# Patient Record
Sex: FEMALE | Race: BLACK OR AFRICAN AMERICAN | Employment: FULL TIME | URBAN - METROPOLITAN AREA
[De-identification: names, ages, dates, MRNs, and addresses within clinical notes are randomized per-mention and may not be internally consistent; named-entity substitution may affect disease eponyms.]

---

## 2018-01-25 PROBLEM — R06.02 SHORTNESS OF BREATH: Status: ACTIVE | Noted: 2018-01-25

## 2018-01-26 PROBLEM — G47.33 OBSTRUCTIVE SLEEP APNEA SYNDROME: Status: ACTIVE | Noted: 2018-01-26

## 2018-01-26 PROBLEM — I10 ESSENTIAL HYPERTENSION, BENIGN: Status: ACTIVE | Noted: 2018-01-26

## 2018-01-26 PROBLEM — E66.01 MORBID OBESITY (HCC): Status: ACTIVE | Noted: 2018-01-26

## 2018-01-26 PROBLEM — I50.43 ACUTE ON CHRONIC COMBINED SYSTOLIC AND DIASTOLIC CHF (CONGESTIVE HEART FAILURE) (HCC): Status: ACTIVE | Noted: 2018-01-26

## 2018-02-09 ENCOUNTER — TELEPHONE (OUTPATIENT)
Dept: CARDIOLOGY CLINIC | Age: 48
End: 2018-02-09

## 2018-02-09 ENCOUNTER — OFFICE VISIT (OUTPATIENT)
Dept: CARDIOLOGY CLINIC | Age: 48
End: 2018-02-09

## 2018-02-09 ENCOUNTER — HOSPITAL ENCOUNTER (OUTPATIENT)
Dept: OTHER | Age: 48
Discharge: HOME | End: 2018-02-09
Attending: CLINICAL NURSE SPECIALIST | Admitting: CLINICAL NURSE SPECIALIST

## 2018-02-09 VITALS
BODY MASS INDEX: 48.82 KG/M2 | WEIGHT: 293 LBS | RESPIRATION RATE: 17 BRPM | DIASTOLIC BLOOD PRESSURE: 68 MMHG | SYSTOLIC BLOOD PRESSURE: 112 MMHG | HEART RATE: 86 BPM | OXYGEN SATURATION: 92 % | HEIGHT: 65 IN

## 2018-02-09 DIAGNOSIS — E66.01 MORBIDLY OBESE (HCC): ICD-10-CM

## 2018-02-09 DIAGNOSIS — I50.40 COMBINED SYSTOLIC AND DIASTOLIC ACC/AHA STAGE C CONGESTIVE HEART FAILURE (HCC): Primary | ICD-10-CM

## 2018-02-09 DIAGNOSIS — G47.33 OSA (OBSTRUCTIVE SLEEP APNEA): ICD-10-CM

## 2018-02-09 DIAGNOSIS — I10 ESSENTIAL HYPERTENSION: ICD-10-CM

## 2018-02-09 LAB
ANION GAP SERPL CALCULATED.3IONS-SCNC: 13 MMOL/L (ref 3–16)
BUN BLDV-MCNC: 13 MG/DL (ref 7–20)
CALCIUM SERPL-MCNC: 9.4 MG/DL (ref 8.3–10.6)
CHLORIDE BLD-SCNC: 97 MMOL/L (ref 99–110)
CO2: 30 MMOL/L (ref 21–32)
CREAT SERPL-MCNC: 1 MG/DL (ref 0.6–1.1)
GFR AFRICAN AMERICAN: >60
GFR NON-AFRICAN AMERICAN: 59
GLUCOSE BLD-MCNC: 114 MG/DL (ref 70–99)
POTASSIUM SERPL-SCNC: 4.5 MMOL/L (ref 3.5–5.1)
PRO-BNP: 229 PG/ML (ref 0–124)
SODIUM BLD-SCNC: 140 MMOL/L (ref 136–145)

## 2018-02-09 PROCEDURE — 99214 OFFICE O/P EST MOD 30 MIN: CPT | Performed by: CLINICAL NURSE SPECIALIST

## 2018-02-09 RX ORDER — CARVEDILOL 6.25 MG/1
6.25 TABLET ORAL 2 TIMES DAILY WITH MEALS
Qty: 60 TABLET | Refills: 1 | Status: SHIPPED | OUTPATIENT
Start: 2018-02-09 | End: 2018-05-01 | Stop reason: SDUPTHER

## 2018-02-09 NOTE — TELEPHONE ENCOUNTER
----- Message from Janee Deluna NP sent at 2/9/2018  1:05 PM EST -----  Sorin jewell  Please send office visit to PCP [facility] clinic? ?   Thanks  rg

## 2018-02-09 NOTE — PROGRESS NOTES
pruritis. · Neurological: No headache, diplopia, change in muscle strength, numbness or tingling. No change in gait, balance, coordination, mood, affect, memory, mentation, behavior. +dizziness  · Psychiatric: No anxiety, no depression. · Endocrine: No malaise, fatigue or temperature intolerance. No excessive thirst, fluid intake, or urination. No tremor. · Hematologic/Lymphatic: No abnormal bruising or bleeding, blood clots or swollen lymph nodes. · Allergic/Immunologic: No nasal congestion or hives. Physical Examination:    Vitals:    02/09/18 0854   BP: 112/68   Site: Left Arm   Position: Sitting   Cuff Size: Medium Adult   Pulse: 86   Resp: 17   SpO2: 92%   Weight: (!) 377 lb 1.9 oz (171.1 kg)   Height: 5' 5\" (1.651 m)        Constitutional and General Appearance: Warm and dry, no apparent distress, normal coloration  HEENT:  Normocephalic, atraumatic  Respiratory:  · Normal excursion and expansion without use of accessory muscles  · Resp Auscultation: Normal breath sounds without dullness  Cardiovascular:  · The apical impulses not displaced  · Heart tones are crisp and normal  · JVP normal cm H2O  · Regular rate and rhythm  · Peripheral pulses are symmetrical and full  · There is no clubbing, cyanosis of the extremities.   · no edema  · Pedal Pulses: 2+ and equal   Abdomen:  · No masses or tenderness  · Liver/Spleen: No Abnormalities Noted  Neurological/Psychiatric:  · Alert and oriented in all spheres  · Moves all extremities well  · Exhibits normal gait balance and coordination  · No abnormalities of mood, affect, memory, mentation, or behavior are noted    Lab Data:    CBC:   Lab Results   Component Value Date    WBC 5.9 02/01/2018    WBC 7.9 01/31/2018    WBC 7.0 01/30/2018    RBC 5.29 02/01/2018    RBC 5.03 01/31/2018    RBC 5.06 01/30/2018    HGB 12.6 02/01/2018    HGB 11.9 01/31/2018    HGB 12.2 01/30/2018    HCT 41.5 02/01/2018    HCT 39.0 01/31/2018    HCT 39.7 01/30/2018    MCV 78.5 02/01/2018 change. - Baseline ECG: Normal sinus rhythm. - Stress: The patient experienced no chest pain during stress. - Stress ECG conclusions: The stress ECG was negative for ischemia. - Baseline: LV global systolic function was normal. The estimated LV    ejection fraction was 50% to 55%. Normal wall motion; no LV    regional wall motion abnormalities. - Peak stress: LV global systolic function was vigorous. The    estimated LV ejection fraction was 70% to 75%. Normal wall motion;    no LV regional wall motion abnormalities. Impressions:  No ischemic wall motion abnormalitiesafter  pharmacologic stress. Assessment:    1. 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 Morbidly obese ([address]    3. Essential hypertension    4. TRUE (obstructive sleep apnea)        Plan:   1. Increase coreg to 6.25 mg twice a day (she is only on once a day)  2. Check labs (BNP and BMP) today  3. Continue current medications  4. Call if weight up 3 pounds in a day or 5 pounds in a week  5. Continue low sodium (<4000) and cardiac diet and 64 ounces of liquids a day  6. RTO in 2 weeks  7. Cardiac rehab referral  8. Recommend shared medical class on Tuesday  9. Handicap letter done    I appreciate the opportunity of cooperating in the care of this individual.    JEFFREY Dunaway, 2/9/2018, 1:03 PM    QUALITY MEASURES  1. Tobacco Cessation Counseling: NA  2. Retake of BP if >140/90:   NA  3. Documentation to PCP/referring for new patient:  Sent to PCP at close of office visit  4. CAD patient on anti-platelet: NA  5. CAD patient on STATIN therapy:  NA  6.  Patient with CHF and aFib on anticoagulation:  NA

## 2018-02-09 NOTE — TELEPHONE ENCOUNTER
----- Message from Konnie Bamberger, NP sent at 2/9/2018  2:26 PM EST -----  Please let her know that labs are good, fluid level is down  Continue all current medications  thanks

## 2018-02-09 NOTE — TELEPHONE ENCOUNTER
Would like to know if nprg would write her an rx for a handicap sticker.  Please call patient when ready [phone]

## 2018-02-23 ENCOUNTER — HOSPITAL ENCOUNTER (OUTPATIENT)
Dept: OTHER | Age: 48
Discharge: HOME | End: 2018-02-28
Attending: CLINICAL NURSE SPECIALIST | Admitting: CLINICAL NURSE SPECIALIST

## 2018-02-23 ENCOUNTER — OFFICE VISIT (OUTPATIENT)
Dept: CARDIOLOGY CLINIC | Age: 48
End: 2018-02-23

## 2018-02-23 VITALS
DIASTOLIC BLOOD PRESSURE: 62 MMHG | RESPIRATION RATE: 17 BRPM | SYSTOLIC BLOOD PRESSURE: 124 MMHG | BODY MASS INDEX: 48.82 KG/M2 | WEIGHT: 293 LBS | OXYGEN SATURATION: 94 % | HEART RATE: 76 BPM | HEIGHT: 65 IN

## 2018-02-23 DIAGNOSIS — E66.01 MORBID OBESITY (HCC): ICD-10-CM

## 2018-02-23 DIAGNOSIS — I10 ESSENTIAL HYPERTENSION: ICD-10-CM

## 2018-02-23 DIAGNOSIS — I42.9 CARDIOMYOPATHY, UNSPECIFIED TYPE (HCC): ICD-10-CM

## 2018-02-23 DIAGNOSIS — G47.33 OBSTRUCTIVE SLEEP APNEA SYNDROME: ICD-10-CM

## 2018-02-23 DIAGNOSIS — I50.22 CHRONIC SYSTOLIC HEART FAILURE (HCC): Primary | ICD-10-CM

## 2018-02-23 LAB
ANION GAP SERPL CALCULATED.3IONS-SCNC: 11 MMOL/L (ref 3–16)
BUN BLDV-MCNC: 13 MG/DL (ref 7–20)
CALCIUM SERPL-MCNC: 9.3 MG/DL (ref 8.3–10.6)
CHLORIDE BLD-SCNC: 100 MMOL/L (ref 99–110)
CO2: 29 MMOL/L (ref 21–32)
CREAT SERPL-MCNC: 0.8 MG/DL (ref 0.6–1.1)
GFR AFRICAN AMERICAN: >60
GFR NON-AFRICAN AMERICAN: >60
GLUCOSE BLD-MCNC: 95 MG/DL (ref 70–99)
POTASSIUM SERPL-SCNC: 3.9 MMOL/L (ref 3.5–5.1)
PRO-BNP: 566 PG/ML (ref 0–124)
SODIUM BLD-SCNC: 140 MMOL/L (ref 136–145)

## 2018-02-23 PROCEDURE — 99215 OFFICE O/P EST HI 40 MIN: CPT | Performed by: CLINICAL NURSE SPECIALIST

## 2018-02-23 RX ORDER — FUROSEMIDE 10 MG/ML
120 INJECTION INTRAMUSCULAR; INTRAVENOUS ONCE
Status: COMPLETED | OUTPATIENT
Start: 2018-02-23 | End: 2018-02-23

## 2018-02-23 RX ORDER — FUROSEMIDE 10 MG/ML
INJECTION INTRAMUSCULAR; INTRAVENOUS
Status: COMPLETED
Start: 2018-02-23 | End: 2018-02-23

## 2018-02-23 RX ORDER — FUROSEMIDE 40 MG/1
40 TABLET ORAL 2 TIMES DAILY
Qty: 60 TABLET | Refills: 3 | Status: SHIPPED | OUTPATIENT
Start: 2018-02-23 | End: 2018-06-19 | Stop reason: SDUPTHER

## 2018-02-23 RX ORDER — FUROSEMIDE 10 MG/ML
INJECTION INTRAMUSCULAR; INTRAVENOUS
Status: DISPENSED
Start: 2018-02-23 | End: 2018-02-24

## 2018-02-23 RX ADMIN — FUROSEMIDE 120 MG: 10 INJECTION INTRAMUSCULAR; INTRAVENOUS at 14:45

## 2018-02-23 NOTE — PROGRESS NOTES
[address]  Progress Note    Primary Care Doctor:  Zachary Burrell    Chief Complaint   Patient presents with    2 Week Follow-Up     Denies chest pain    Congestive Heart Failure    Hypertension    Obesity    Sleep Apnea    Dizziness     when having a  coughing spell    Shortness of Breath     with any exertion  uses O2 @ 2L via nc when she feels she needs it    Cough     still has cough; productive of white phlegm at times    Edema     BLE  left leg feels heavy and weak at times        History of Present Illness:  50year old female with history of HTN, DM, morbidly obese and asthma. Hospital admission 1/25-2/1 for flu, sob and newly diagnosis of combined diastolic/systolic heart failure. She declined having stress test and not interested in pursuing sleep evaluation for TRUE    I had the pleasure of seeing Jarocho Avendaño in follow up sHF. Her weight is up 13 pounds which she states is because she didn't take lasix sat and sunday in the mornings because she went out to eat with family and to the movies. She ate popcorn with butter and 3 different restaurants. She is more sob, using oxygen more during the day, coughing at night/day when she sleeps. She is dizzy after she coughs. She has not been weighing her self as she was afraid of how much she weighs. She was given a scale from us when she left. She continues with some swelling in her lower legs    Past Medical History:   has a past medical history of Asthma; Diabetes mellitus ([address] Heart murmur; and Hypertension. Surgical History:   has no past surgical history on file. Social History:   reports that she has never smoked. She has never used smokeless tobacco. She reports that she does not drink alcohol or use drugs.    Family History:   Family History   Problem Relation Age of Onset    High Blood Pressure Mother     Other Mother      cardiac murmur    Arthritis Mother     High Blood Pressure Father     High Blood Pressure

## 2018-03-01 ENCOUNTER — HOSPITAL ENCOUNTER (OUTPATIENT)
Dept: ONCOLOGY | Age: 48
Discharge: HOME | End: 2018-03-31
Attending: CLINICAL NURSE SPECIALIST | Admitting: CLINICAL NURSE SPECIALIST

## 2018-03-19 ENCOUNTER — OFFICE VISIT (OUTPATIENT)
Dept: CARDIOLOGY CLINIC | Age: 48
End: 2018-03-19

## 2018-03-19 ENCOUNTER — HOSPITAL ENCOUNTER (OUTPATIENT)
Dept: OTHER | Age: 48
Discharge: HOME | End: 2018-03-19
Attending: CLINICAL NURSE SPECIALIST | Admitting: CLINICAL NURSE SPECIALIST

## 2018-03-19 VITALS
BODY MASS INDEX: 48.82 KG/M2 | RESPIRATION RATE: 17 BRPM | DIASTOLIC BLOOD PRESSURE: 62 MMHG | HEART RATE: 89 BPM | SYSTOLIC BLOOD PRESSURE: 108 MMHG | WEIGHT: 293 LBS | HEIGHT: 65 IN | OXYGEN SATURATION: 92 %

## 2018-03-19 DIAGNOSIS — I50.22 CHRONIC SYSTOLIC HEART FAILURE (HCC): Primary | ICD-10-CM

## 2018-03-19 DIAGNOSIS — I50.22 CHRONIC SYSTOLIC HEART FAILURE (HCC): ICD-10-CM

## 2018-03-19 LAB
ANION GAP SERPL CALCULATED.3IONS-SCNC: 14 MMOL/L (ref 3–16)
BUN BLDV-MCNC: 10 MG/DL (ref 7–20)
CALCIUM SERPL-MCNC: 9 MG/DL (ref 8.3–10.6)
CHLORIDE BLD-SCNC: 95 MMOL/L (ref 99–110)
CO2: 32 MMOL/L (ref 21–32)
CREAT SERPL-MCNC: 0.8 MG/DL (ref 0.6–1.1)
GFR AFRICAN AMERICAN: >60
GFR NON-AFRICAN AMERICAN: >60
GLUCOSE BLD-MCNC: 91 MG/DL (ref 70–99)
POTASSIUM SERPL-SCNC: 3.9 MMOL/L (ref 3.5–5.1)
PRO-BNP: 435 PG/ML (ref 0–124)
SODIUM BLD-SCNC: 141 MMOL/L (ref 136–145)

## 2018-03-19 PROCEDURE — 99214 OFFICE O/P EST MOD 30 MIN: CPT | Performed by: CLINICAL NURSE SPECIALIST

## 2018-03-19 RX ORDER — VALSARTAN 80 MG/1
80 TABLET ORAL DAILY
Qty: 30 TABLET | Refills: 1 | Status: SHIPPED | OUTPATIENT
Start: 2018-03-19 | End: 2018-06-18 | Stop reason: SDUPTHER

## 2018-03-19 NOTE — PATIENT INSTRUCTIONS
1.  Stop lisinopril and start valsartan 80 mg once a day  2. Check fluid and electrolytes today  3. Cardiac rehab evaluation 3/22/18   4.   RTO in 1 month

## 2018-03-19 NOTE — PROGRESS NOTES
[facility] [place]    Primary Care Doctor:  Ralph Stacy    Chief Complaint   Patient presents with    2 Week Follow-Up     Denies chest pain,     Congestive Heart Failure    Cardiomyopathy    Hypertension    Obesity    Palpitations     gets palpitations on occasion    Shortness of Breath     with activity    Dizziness     sometimes    Edema     BLE    Fatigue        History of Present Illness:  50year old female with history of HTN, DM, morbidly obese and asthma. Hospital admission 1/25-2/1 for flu, sob and newly diagnosis of combined diastolic/systolic heart failure. She declined having stress test and not interested in pursuing sleep evaluation for TRUE    I had the pleasure of seeing Antonia Reece in follow up sHF. At last appointment, she was up 13 pounds due to dietary indiscretions and today her weight is down 10. She had received some IV lasix and continues on all her medications. She complains of a cough all the time. She has some sob with exertion and some dizziness at times. BLE edema has improved. Fatigue and palpitations at times but none today. Her weight at home has declined as well. She is scheduled later this week for her cardiac rehab evaluation. Past Medical History:   has a past medical history of Asthma; Diabetes mellitus ([address] Heart murmur; and Hypertension. Surgical History:   has no past surgical history on file. Social History:   reports that she has never smoked. She has never used smokeless tobacco. She reports that she does not drink alcohol or use drugs. Family History:   Family History   Problem Relation Age of Onset    High Blood Pressure Mother     Other Mother      cardiac murmur    Arthritis Mother     High Blood Pressure Father     High Blood Pressure Sister     Diabetes Sister      prediabetic       Home Medications:  Prior to Admission medications    Medication Sig Start Date End Date Taking?  Authorizing Provider   furosemide (LASIX) 40 MG tablet Take 1 tablet by mouth 2 times daily 80 mg in am and 40 mg in pm 2/23/18  Yes Lea Ambriz NP   carvedilol (COREG) 6.25 MG tablet Take 1 tablet by mouth 2 times daily (with meals) 2/9/18  Yes Lea Ambriz NP   aspirin 81 MG chewable tablet Take 1 tablet by mouth daily 2/2/18  Yes Williams Hernandez MD   atorvastatin (LIPITOR) 20 MG tablet Take 1 tablet by mouth nightly 2/1/18  Yes Williams Hernandez MD   lisinopril (PRINIVIL;ZESTRIL) 10 MG tablet Take 1 tablet by mouth daily 2/2/18  Yes Williams Hernandez MD   metFORMIN (GLUCOPHAGE) 500 MG tablet Take 1 tablet by mouth 2 times daily (with meals)  Patient taking differently: Take 500 mg by mouth 2 times daily (with meals)  2/1/18  Yes Williams Hernandez MD   albuterol sulfate  (90 Base) MCG/ACT inhaler Inhale 2 puffs into the lungs 12/21/17  Yes Historical Provider, MD   omeprazole (PRILOSEC) 40 MG delayed release capsule Take 40 mg by mouth  6/9/17  Yes Historical Provider, MD   ketoconazole (NIZORAL) 2 % cream Apply  topically daily. Apply topically daily. Yes Historical Provider, MD   mometasone-formoterol [facility]) 100-5 MCG/ACT inhaler Inhale 2 puffs into the lungs 2 times daily 2/1/18   Williams Hernandez MD        Allergies:  Levofloxacin     Review of Systems:   · Constitutional: there has been no unanticipated weight loss. There's been no change in energy level, sleep pattern, or activity level. · Eyes: No visual changes or diplopia. No scleral icterus. · ENT: No Headaches, hearing loss or vertigo. No mouth sores or sore throat. BLE/fatigue/palpitations  · Cardiovascular: Reviewed in HPI  · Respiratory: No cough or wheezing, no sputum production. No hematemesis. +cough/SOB with exertion   · Gastrointestinal: No abdominal pain, appetite loss, blood in stools. No change in bowel or bladder habits. · Genitourinary: No dysuria, trouble voiding, or hematuria.   · Musculoskeletal:  No gait disturbance, weakness or joint complaints. · Integumentary: No rash or pruritis. · Neurological: No headache, diplopia, change in muscle strength, numbness or tingling. No change in gait, balance, coordination, mood, affect, memory, mentation, behavior. +dizziness  · Psychiatric: No anxiety, no depression. · Endocrine: No malaise, fatigue or temperature intolerance. No excessive thirst, fluid intake, or urination. No tremor. · Hematologic/Lymphatic: No abnormal bruising or bleeding, blood clots or swollen lymph nodes. · Allergic/Immunologic: No nasal congestion or hives. Physical Examination:    Vitals:    03/19/18 1437   BP: 108/62   Site: Left Arm   Position: Sitting   Cuff Size: Large Adult   Pulse: 89   Resp: 17   SpO2: 92%   Weight: (!) 380 lb 9.6 oz (172.6 kg)   Height: 5' 5\" (1.651 m)        Constitutional and General Appearance: Warm and dry, no apparent distress, normal coloration  HEENT:  Normocephalic, atraumatic  Respiratory:  · Normal excursion and expansion without use of accessory muscles  · Resp Auscultation: Normal breath sounds without dullness  Cardiovascular:  · The apical impulses not displaced  · Heart tones are crisp and normal  · JVP normal cm H2O  · Regular rate and rhythm  · Peripheral pulses are symmetrical and full  · There is no clubbing, cyanosis of the extremities.   · tiara lower leg edema ankles only  · Pedal Pulses: 2+ and equal   Abdomen:  · No masses or tenderness  · Liver/Spleen: No Abnormalities Noted  Neurological/Psychiatric:  · Alert and oriented in all spheres  · Moves all extremities well  · Exhibits normal gait balance and coordination  · No abnormalities of mood, affect, memory, mentation, or behavior are noted    Lab Data:    CBC:   Lab Results   Component Value Date    WBC 5.9 02/01/2018    WBC 7.9 01/31/2018    WBC 7.0 01/30/2018    RBC 5.29 02/01/2018    RBC 5.03 01/31/2018    RBC 5.06 01/30/2018    HGB 12.6 02/01/2018    HGB 11.9 01/31/2018    HGB 12.2 01/30/2018    HCT 41.5

## 2018-03-20 ENCOUNTER — TELEPHONE (OUTPATIENT)
Dept: CARDIOLOGY CLINIC | Age: 48
End: 2018-03-20

## 2018-03-22 ENCOUNTER — HOSPITAL ENCOUNTER (OUTPATIENT)
Dept: CARDIAC REHAB | Age: 48
Discharge: HOME | End: 2018-03-31
Attending: FAMILY MEDICINE | Admitting: CLINICAL NURSE SPECIALIST

## 2018-04-01 ENCOUNTER — HOSPITAL ENCOUNTER (OUTPATIENT)
Dept: OTHER | Age: 48
Discharge: HOME | End: 2018-04-30
Attending: CLINICAL NURSE SPECIALIST | Admitting: CLINICAL NURSE SPECIALIST

## 2018-04-16 ENCOUNTER — TELEPHONE (OUTPATIENT)
Dept: OTHER | Age: 48
End: 2018-04-16

## 2018-04-30 ENCOUNTER — OFFICE VISIT (OUTPATIENT)
Dept: CARDIOLOGY CLINIC | Age: 48
End: 2018-04-30

## 2018-04-30 VITALS
BODY MASS INDEX: 48.82 KG/M2 | WEIGHT: 293 LBS | SYSTOLIC BLOOD PRESSURE: 112 MMHG | HEIGHT: 65 IN | OXYGEN SATURATION: 93 % | DIASTOLIC BLOOD PRESSURE: 60 MMHG | RESPIRATION RATE: 17 BRPM | HEART RATE: 96 BPM

## 2018-04-30 DIAGNOSIS — I50.22 CHRONIC SYSTOLIC HEART FAILURE (HCC): Primary | ICD-10-CM

## 2018-04-30 DIAGNOSIS — I42.9 CARDIOMYOPATHY, UNSPECIFIED TYPE (HCC): ICD-10-CM

## 2018-04-30 DIAGNOSIS — I50.22 CHRONIC SYSTOLIC HEART FAILURE (HCC): ICD-10-CM

## 2018-04-30 DIAGNOSIS — I10 ESSENTIAL HYPERTENSION: ICD-10-CM

## 2018-04-30 DIAGNOSIS — E66.01 MORBID OBESITY (HCC): ICD-10-CM

## 2018-04-30 DIAGNOSIS — G47.33 OBSTRUCTIVE SLEEP APNEA SYNDROME: ICD-10-CM

## 2018-04-30 LAB
ANION GAP SERPL CALCULATED.3IONS-SCNC: 13 MMOL/L (ref 3–16)
BUN BLDV-MCNC: 12 MG/DL (ref 7–20)
CALCIUM SERPL-MCNC: 9.3 MG/DL (ref 8.3–10.6)
CHLORIDE BLD-SCNC: 95 MMOL/L (ref 99–110)
CO2: 33 MMOL/L (ref 21–32)
CREAT SERPL-MCNC: 0.8 MG/DL (ref 0.6–1.1)
GFR AFRICAN AMERICAN: >60
GFR NON-AFRICAN AMERICAN: >60
GLUCOSE BLD-MCNC: 125 MG/DL (ref 70–99)
POTASSIUM SERPL-SCNC: 3.9 MMOL/L (ref 3.5–5.1)
PRO-BNP: 199 PG/ML (ref 0–124)
SODIUM BLD-SCNC: 141 MMOL/L (ref 136–145)

## 2018-04-30 PROCEDURE — 99214 OFFICE O/P EST MOD 30 MIN: CPT | Performed by: CLINICAL NURSE SPECIALIST

## 2018-04-30 RX ORDER — SPIRONOLACTONE 25 MG/1
12.5 TABLET ORAL DAILY
Qty: 15 TABLET | Refills: 1 | Status: SHIPPED | OUTPATIENT
Start: 2018-04-30 | End: 2018-06-19 | Stop reason: SDUPTHER

## 2018-05-01 ENCOUNTER — TELEPHONE (OUTPATIENT)
Dept: CARDIOLOGY CLINIC | Age: 48
End: 2018-05-01

## 2018-05-01 ENCOUNTER — HOSPITAL ENCOUNTER (OUTPATIENT)
Dept: OTHER | Age: 48
Discharge: HOME | End: 2018-05-31
Attending: CLINICAL NURSE SPECIALIST | Admitting: CLINICAL NURSE SPECIALIST

## 2018-05-01 DIAGNOSIS — I50.22 CHRONIC SYSTOLIC HEART FAILURE (HCC): Primary | ICD-10-CM

## 2018-05-22 ENCOUNTER — TELEPHONE (OUTPATIENT)
Dept: CARDIOLOGY CLINIC | Age: 48
End: 2018-05-22

## 2018-05-22 ENCOUNTER — HOSPITAL ENCOUNTER (OUTPATIENT)
Dept: OTHER | Age: 48
Discharge: HOME | End: 2018-05-22
Attending: CLINICAL NURSE SPECIALIST | Admitting: CLINICAL NURSE SPECIALIST

## 2018-05-22 ENCOUNTER — OFFICE VISIT (OUTPATIENT)
Dept: SLEEP MEDICINE | Age: 48
End: 2018-05-22

## 2018-05-22 VITALS
OXYGEN SATURATION: 94 % | DIASTOLIC BLOOD PRESSURE: 68 MMHG | HEART RATE: 84 BPM | HEIGHT: 65 IN | WEIGHT: 293 LBS | BODY MASS INDEX: 48.82 KG/M2 | SYSTOLIC BLOOD PRESSURE: 104 MMHG

## 2018-05-22 DIAGNOSIS — I42.9 CARDIOMYOPATHY, UNSPECIFIED TYPE (HCC): Chronic | ICD-10-CM

## 2018-05-22 DIAGNOSIS — I10 ESSENTIAL HYPERTENSION: Chronic | ICD-10-CM

## 2018-05-22 DIAGNOSIS — R06.83 SNORING: ICD-10-CM

## 2018-05-22 DIAGNOSIS — E66.01 MORBID OBESITY (HCC): Chronic | ICD-10-CM

## 2018-05-22 DIAGNOSIS — G47.10 HYPERSOMNIA: Primary | ICD-10-CM

## 2018-05-22 DIAGNOSIS — I50.43 ACUTE ON CHRONIC COMBINED SYSTOLIC AND DIASTOLIC CHF (CONGESTIVE HEART FAILURE) (HCC): Chronic | ICD-10-CM

## 2018-05-22 DIAGNOSIS — I50.22 CHRONIC SYSTOLIC HEART FAILURE (HCC): ICD-10-CM

## 2018-05-22 DIAGNOSIS — E11.8 TYPE 2 DIABETES MELLITUS WITH COMPLICATION, UNSPECIFIED LONG TERM INSULIN USE STATUS: Chronic | ICD-10-CM

## 2018-05-22 PROBLEM — E11.9 DIABETES MELLITUS (HCC): Status: ACTIVE | Noted: 2018-05-22

## 2018-05-22 PROBLEM — E11.9 DIABETES MELLITUS (HCC): Chronic | Status: ACTIVE | Noted: 2018-05-22

## 2018-05-22 LAB
ANION GAP SERPL CALCULATED.3IONS-SCNC: 13 MMOL/L (ref 3–16)
BUN BLDV-MCNC: 16 MG/DL (ref 7–20)
CALCIUM SERPL-MCNC: 9.1 MG/DL (ref 8.3–10.6)
CHLORIDE BLD-SCNC: 96 MMOL/L (ref 99–110)
CO2: 31 MMOL/L (ref 21–32)
CREAT SERPL-MCNC: 0.8 MG/DL (ref 0.6–1.1)
GFR AFRICAN AMERICAN: >60
GFR NON-AFRICAN AMERICAN: >60
GLUCOSE BLD-MCNC: 92 MG/DL (ref 70–99)
POTASSIUM SERPL-SCNC: 4.4 MMOL/L (ref 3.5–5.1)
PRO-BNP: 161 PG/ML (ref 0–124)
SODIUM BLD-SCNC: 140 MMOL/L (ref 136–145)

## 2018-05-22 PROCEDURE — 99244 OFF/OP CNSLTJ NEW/EST MOD 40: CPT | Performed by: INTERNAL MEDICINE

## 2018-05-22 ASSESSMENT — SLEEP AND FATIGUE QUESTIONNAIRES
HOW LIKELY ARE YOU TO NOD OFF OR FALL ASLEEP WHILE LYING DOWN TO REST IN THE AFTERNOON WHEN CIRCUMSTANCES PERMIT: 3
HOW LIKELY ARE YOU TO NOD OFF OR FALL ASLEEP WHILE SITTING QUIETLY AFTER LUNCH WITHOUT ALCOHOL: 1
HOW LIKELY ARE YOU TO NOD OFF OR FALL ASLEEP WHILE WATCHING TV: 1
HOW LIKELY ARE YOU TO NOD OFF OR FALL ASLEEP WHILE SITTING AND READING: 1
HOW LIKELY ARE YOU TO NOD OFF OR FALL ASLEEP WHILE SITTING INACTIVE IN A PUBLIC PLACE: 0
HOW LIKELY ARE YOU TO NOD OFF OR FALL ASLEEP WHEN YOU ARE A PASSENGER IN A CAR FOR AN HOUR WITHOUT A BREAK: 1
HOW LIKELY ARE YOU TO NOD OFF OR FALL ASLEEP IN A CAR, WHILE STOPPED FOR A FEW MINUTES IN TRAFFIC: 0
NECK CIRCUMFERENCE (INCHES): 17
ESS TOTAL SCORE: 7
HOW LIKELY ARE YOU TO NOD OFF OR FALL ASLEEP WHILE SITTING AND TALKING TO SOMEONE: 0

## 2018-05-22 ASSESSMENT — ENCOUNTER SYMPTOMS
ABDOMINAL DISTENTION: 0
APNEA: 0
ABDOMINAL PAIN: 0
PHOTOPHOBIA: 0
CHOKING: 0
RHINORRHEA: 0
SHORTNESS OF BREATH: 1
NAUSEA: 0
CHEST TIGHTNESS: 0
ALLERGIC/IMMUNOLOGIC NEGATIVE: 1
VOMITING: 0
EYE PAIN: 0

## 2018-05-23 ENCOUNTER — TELEPHONE (OUTPATIENT)
Dept: CARDIOLOGY CLINIC | Age: 48
End: 2018-05-23

## 2018-05-25 ENCOUNTER — TELEPHONE (OUTPATIENT)
Dept: CARDIOLOGY CLINIC | Age: 48
End: 2018-05-25

## 2018-05-31 ENCOUNTER — OFFICE VISIT (OUTPATIENT)
Dept: CARDIOLOGY CLINIC | Age: 48
End: 2018-05-31

## 2018-05-31 VITALS
HEIGHT: 65 IN | SYSTOLIC BLOOD PRESSURE: 124 MMHG | DIASTOLIC BLOOD PRESSURE: 70 MMHG | WEIGHT: 293 LBS | HEART RATE: 96 BPM | BODY MASS INDEX: 48.82 KG/M2

## 2018-05-31 DIAGNOSIS — I50.22 CHRONIC SYSTOLIC HEART FAILURE (HCC): Primary | ICD-10-CM

## 2018-05-31 DIAGNOSIS — E66.01 MORBID OBESITY (HCC): Chronic | ICD-10-CM

## 2018-05-31 DIAGNOSIS — G47.33 OBSTRUCTIVE SLEEP APNEA SYNDROME: ICD-10-CM

## 2018-05-31 DIAGNOSIS — I10 ESSENTIAL HYPERTENSION: Chronic | ICD-10-CM

## 2018-05-31 PROCEDURE — 99214 OFFICE O/P EST MOD 30 MIN: CPT | Performed by: CLINICAL NURSE SPECIALIST

## 2018-05-31 RX ORDER — CARVEDILOL 12.5 MG/1
12.5 TABLET ORAL 2 TIMES DAILY
Qty: 60 TABLET | Refills: 1 | Status: SHIPPED | OUTPATIENT
Start: 2018-05-31 | End: 2018-08-24 | Stop reason: SDUPTHER

## 2018-06-04 ENCOUNTER — TELEPHONE (OUTPATIENT)
Dept: CARDIOLOGY CLINIC | Age: 48
End: 2018-06-04

## 2018-06-19 RX ORDER — FUROSEMIDE 40 MG/1
40 TABLET ORAL SEE ADMIN INSTRUCTIONS
Qty: 60 TABLET | Refills: 0 | Status: SHIPPED | OUTPATIENT
Start: 2018-06-19 | End: 2018-08-09 | Stop reason: SDUPTHER

## 2018-06-19 RX ORDER — SPIRONOLACTONE 25 MG/1
12.5 TABLET ORAL DAILY
Qty: 15 TABLET | Refills: 0 | Status: SHIPPED | OUTPATIENT
Start: 2018-06-19 | End: 2018-08-24 | Stop reason: SDUPTHER

## 2018-06-19 RX ORDER — ASPIRIN 81 MG/1
81 TABLET, CHEWABLE ORAL DAILY
Qty: 30 TABLET | Refills: 1 | Status: SHIPPED | OUTPATIENT
Start: 2018-06-19 | End: 2018-11-03 | Stop reason: SDUPTHER

## 2018-06-20 ENCOUNTER — HOSPITAL ENCOUNTER (OUTPATIENT)
Dept: SLEEP MEDICINE | Age: 48
Discharge: HOME | End: 2018-06-22
Attending: INTERNAL MEDICINE | Admitting: INTERNAL MEDICINE

## 2018-06-20 DIAGNOSIS — G47.10 HYPERSOMNIA: ICD-10-CM

## 2018-06-20 DIAGNOSIS — I42.9 CARDIOMYOPATHY, UNSPECIFIED TYPE (HCC): Chronic | ICD-10-CM

## 2018-06-20 DIAGNOSIS — I50.43 ACUTE ON CHRONIC COMBINED SYSTOLIC AND DIASTOLIC CHF (CONGESTIVE HEART FAILURE) (HCC): Chronic | ICD-10-CM

## 2018-06-20 DIAGNOSIS — R06.83 SNORING: ICD-10-CM

## 2018-06-20 PROCEDURE — 95810 POLYSOM 6/> YRS 4/> PARAM: CPT | Performed by: INTERNAL MEDICINE

## 2018-06-27 ENCOUNTER — TELEPHONE (OUTPATIENT)
Dept: SLEEP MEDICINE | Age: 48
End: 2018-06-27

## 2018-07-02 ENCOUNTER — TELEPHONE (OUTPATIENT)
Dept: SLEEP MEDICINE | Age: 48
End: 2018-07-02

## 2018-08-01 DIAGNOSIS — G47.33 OBSTRUCTIVE SLEEP APNEA SYNDROME: Primary | ICD-10-CM

## 2018-08-09 ENCOUNTER — TELEPHONE (OUTPATIENT)
Dept: CARDIOLOGY CLINIC | Age: 48
End: 2018-08-09

## 2018-08-09 RX ORDER — IRBESARTAN 150 MG/1
75 TABLET ORAL NIGHTLY
Qty: 30 TABLET | Refills: 0 | Status: SHIPPED | OUTPATIENT
Start: 2018-08-09 | End: 2018-08-24 | Stop reason: SDUPTHER

## 2018-08-09 RX ORDER — ATORVASTATIN CALCIUM 20 MG/1
20 TABLET, FILM COATED ORAL NIGHTLY
Qty: 30 TABLET | Refills: 0 | Status: SHIPPED | OUTPATIENT
Start: 2018-08-09 | End: 2018-08-24 | Stop reason: SDUPTHER

## 2018-08-09 RX ORDER — FUROSEMIDE 40 MG/1
40 TABLET ORAL SEE ADMIN INSTRUCTIONS
Qty: 90 TABLET | Refills: 0 | Status: SHIPPED | OUTPATIENT
Start: 2018-08-09 | End: 2018-10-18 | Stop reason: SDUPTHER

## 2018-08-24 ENCOUNTER — OFFICE VISIT (OUTPATIENT)
Dept: CARDIOLOGY CLINIC | Age: 48
End: 2018-08-24

## 2018-08-24 ENCOUNTER — HOSPITAL ENCOUNTER (OUTPATIENT)
Dept: OTHER | Age: 48
Discharge: HOME | End: 2018-08-24
Attending: CLINICAL NURSE SPECIALIST | Admitting: CLINICAL NURSE SPECIALIST

## 2018-08-24 VITALS
BODY MASS INDEX: 48.82 KG/M2 | RESPIRATION RATE: 17 BRPM | WEIGHT: 293 LBS | HEART RATE: 86 BPM | DIASTOLIC BLOOD PRESSURE: 70 MMHG | HEIGHT: 65 IN | SYSTOLIC BLOOD PRESSURE: 132 MMHG | OXYGEN SATURATION: 92 %

## 2018-08-24 DIAGNOSIS — G47.33 OSA (OBSTRUCTIVE SLEEP APNEA): ICD-10-CM

## 2018-08-24 DIAGNOSIS — I10 ESSENTIAL HYPERTENSION: ICD-10-CM

## 2018-08-24 DIAGNOSIS — I50.22 CHRONIC SYSTOLIC HEART FAILURE (HCC): Primary | ICD-10-CM

## 2018-08-24 DIAGNOSIS — E66.01 MORBID OBESITY WITH BMI OF 60.0-69.9, ADULT (HCC): ICD-10-CM

## 2018-08-24 DIAGNOSIS — I50.22 CHRONIC SYSTOLIC HEART FAILURE (HCC): ICD-10-CM

## 2018-08-24 LAB
ANION GAP SERPL CALCULATED.3IONS-SCNC: 10 MMOL/L (ref 3–16)
BUN BLDV-MCNC: 13 MG/DL (ref 7–20)
CALCIUM SERPL-MCNC: 9.3 MG/DL (ref 8.3–10.6)
CHLORIDE BLD-SCNC: 97 MMOL/L (ref 99–110)
CO2: 33 MMOL/L (ref 21–32)
CREAT SERPL-MCNC: 0.8 MG/DL (ref 0.6–1.1)
GFR AFRICAN AMERICAN: >60
GFR NON-AFRICAN AMERICAN: >60
GLUCOSE BLD-MCNC: 95 MG/DL (ref 70–99)
POTASSIUM SERPL-SCNC: 4.1 MMOL/L (ref 3.5–5.1)
PRO-BNP: 155 PG/ML (ref 0–124)
SODIUM BLD-SCNC: 140 MMOL/L (ref 136–145)

## 2018-08-24 PROCEDURE — 99214 OFFICE O/P EST MOD 30 MIN: CPT | Performed by: CLINICAL NURSE SPECIALIST

## 2018-08-24 RX ORDER — CARVEDILOL 12.5 MG/1
12.5 TABLET ORAL 2 TIMES DAILY
Qty: 60 TABLET | Refills: 1 | Status: SHIPPED | OUTPATIENT
Start: 2018-08-24 | End: 2019-01-18 | Stop reason: SDUPTHER

## 2018-08-24 RX ORDER — SPIRONOLACTONE 25 MG/1
12.5 TABLET ORAL DAILY
Qty: 15 TABLET | Refills: 0 | Status: SHIPPED | OUTPATIENT
Start: 2018-08-24 | End: 2019-01-18 | Stop reason: SDUPTHER

## 2018-08-24 RX ORDER — IRBESARTAN 150 MG/1
75 TABLET ORAL NIGHTLY
Qty: 30 TABLET | Refills: 0 | Status: SHIPPED | OUTPATIENT
Start: 2018-08-24 | End: 2019-01-18 | Stop reason: SDUPTHER

## 2018-08-24 RX ORDER — ATORVASTATIN CALCIUM 20 MG/1
20 TABLET, FILM COATED ORAL NIGHTLY
Qty: 30 TABLET | Refills: 0 | Status: SHIPPED | OUTPATIENT
Start: 2018-08-24 | End: 2018-11-03 | Stop reason: SDUPTHER

## 2018-08-24 NOTE — PROGRESS NOTES
[address]  Progress Note    Primary Care Doctor:  Loreta Goldberg    Chief Complaint   Patient presents with    3 Month Follow-Up     Denies chest pain    Hypertension    Obesity    Sleep Apnea    Dizziness     certain way she moves her head gets dizzy at times    Edema     BLE    Shortness of Breath     always with any exertion    Fatigue     always        History of Present Illness:  50year old female with history of HTN, DM, morbidly obese and asthma. Hospital admission 1/25-2/1 for flu, sob and newly diagnosis of combined diastolic/systolic heart failure. She declined having stress test     I had the pleasure of seeing Kristina Calvo in follow up sHF. She is ambulatory by her self today. Her weight is up 11 pounds and she has stopped taking her lasix 40 mg in the evening (\"I'm too busy\"). She admits to eating some fritos. She complains of dizziness all the time, sob with activity, edema in both legs and fatigue. She needs to reschedule her sleep titration appointment. She states that she has joined [employer]. She denies any chest pain or palpitations. She is taking all other medications. Past Medical History:   has a past medical history of Acute on chronic combined systolic and diastolic CHF (congestive heart failure) ([address] Asthma; Cardiomyopathy ([address] Diabetes mellitus [address] Heart murmur; and Hypertension. Surgical History:   has no past surgical history on file. Social History:   reports that she has never smoked. She has never used smokeless tobacco. She reports that she does not drink alcohol or use drugs.    Family History:   Family History   Problem Relation Age of Onset    High Blood Pressure Mother     Other Mother         cardiac murmur    Arthritis Mother     High Blood Pressure Father     High Blood Pressure Sister     Diabetes Sister         prediabetic       Home Medications:  Prior to Admission medications    Medication Sig Start Date End Date Reviewed in HPI  · Respiratory: No cough or wheezing, no sputum production. No hematemesis. · Gastrointestinal: No abdominal pain, appetite loss, blood in stools. No change in bowel or bladder habits. · Genitourinary: No dysuria, trouble voiding, or hematuria. · Musculoskeletal:  No gait disturbance, weakness or joint complaints. · Integumentary: No rash or pruritis. · Neurological: No headache, diplopia, change in muscle strength, numbness or tingling. No change in gait, balance, coordination, mood, affect, memory, mentation, behavior. · Psychiatric: No anxiety, no depression. · Endocrine: No malaise, fatigue or temperature intolerance. No excessive thirst, fluid intake, or urination. No tremor. · Hematologic/Lymphatic: No abnormal bruising or bleeding, blood clots or swollen lymph nodes. · Allergic/Immunologic: No nasal congestion or hives. Physical Examination:    Vitals:    08/24/18 1347   BP: 132/70   Site: Left Arm   Position: Sitting   Cuff Size: Large Adult   Pulse: 86   Resp: 17   SpO2: 92%   Weight: (!) 401 lb 3.2 oz (182 kg)   Height: 5' 5\" (1.651 m)        Constitutional and General Appearance: Warm and dry, no apparent distress, normal coloration  HEENT:  Normocephalic, atraumatic  Respiratory:  · Normal excursion and expansion without use of accessory muscles  · Resp Auscultation: Normal breath sounds without dullness  Cardiovascular:  · The apical impulses not displaced  · Heart tones are crisp and normal  · JVP normal cm H2O  · Regular rate and rhythm  · Peripheral pulses are symmetrical and full  · There is no clubbing, cyanosis of the extremities.   · tiara lower leg edema ankles   · Pedal Pulses: 2+ and equal   Abdomen:  · No masses or tenderness  · Liver/Spleen: No Abnormalities Noted  Neurological/Psychiatric:  · Alert and oriented in all spheres  · Moves all extremities well  · Exhibits normal gait balance and coordination  · No abnormalities of mood, affect, memory, mentation, or with concomitant    abnormal relaxation and increased filling pressure (grade 2    diastolic dysfunction). - Mitral valve: Mild regurgitation.  - Right ventricle: Systolic function was normal.     Stress echo 6/24/2013:  Study Conclusions  - Study data: The previous study was not available, so comparison    was made to the report of April 29, 2013, and there has been no    significant change. - Baseline ECG: Normal sinus rhythm. - Stress: The patient experienced no chest pain during stress. - Stress ECG conclusions: The stress ECG was negative for ischemia. - Baseline: LV global systolic function was normal. The estimated LV    ejection fraction was 50% to 55%. Normal wall motion; no LV    regional wall motion abnormalities. - Peak stress: LV global systolic function was vigorous. The    estimated LV ejection fraction was 70% to 75%. Normal wall motion;    no LV regional wall motion abnormalities. Impressions:  No ischemic wall motion abnormalitiesafter  pharmacologic stress. Assessment:    1. Chronic systolic heart failure (HCC) on ace, BB and aldosterone antagonist, compensated   2. Essential hypertension controlled   3. Morbid obesity ([address]    4. Obstructive sleep apnea syndrome following with Dr Concepcion Goncalves:   1. Continue all current medications  2.  fri sat and Sunday take 80 mg twice a day and then resume your 40 mg in the evening along with the 80 mg in the morning  3. Check blood work (bnp and bmp) today  4. RTO in 2 month  5. Call sleep lab to complete study titration    I appreciate the opportunity of cooperating in the care of this individual.    JEFFREY Cheung, 8/24/2018, 2:01 PM    QUALITY MEASURES  1. Tobacco Cessation Counseling: NA  2. Retake of BP if >140/90:   NA  3. Documentation to PCP/referring for new patient:  Sent to PCP at close of office visit  4. CAD patient on anti-platelet: NA  5. CAD patient on STATIN therapy:  NA  6.  Patient with CHF and aFib on

## 2018-08-27 ENCOUNTER — TELEPHONE (OUTPATIENT)
Dept: CARDIOLOGY CLINIC | Age: 48
End: 2018-08-27

## 2018-11-05 RX ORDER — ATORVASTATIN CALCIUM 20 MG/1
TABLET, FILM COATED ORAL
Qty: 30 TABLET | Refills: 0 | Status: SHIPPED | OUTPATIENT
Start: 2018-11-05 | End: 2019-10-28 | Stop reason: SDUPTHER

## 2018-11-05 RX ORDER — ASPIRIN 81 MG
TABLET,CHEWABLE ORAL
Qty: 30 TABLET | Refills: 0 | Status: SHIPPED | OUTPATIENT
Start: 2018-11-05 | End: 2019-03-03 | Stop reason: SDUPTHER

## 2018-11-05 RX ORDER — FUROSEMIDE 40 MG/1
TABLET ORAL
Qty: 90 TABLET | Refills: 0 | Status: SHIPPED | OUTPATIENT
Start: 2018-11-05 | End: 2019-01-18 | Stop reason: SDUPTHER

## 2018-11-05 NOTE — TELEPHONE ENCOUNTER
LF lasix 10/18/#90, 0  LF ASA 6/19 #30, 1  LF Atorvastatin 8/24/18 #30, 0    LOV and labs 8/24/18  Fasting lipids 1/26/18  Needs FU

## 2018-12-11 DIAGNOSIS — I50.22 CHRONIC SYSTOLIC HEART FAILURE (HCC): ICD-10-CM

## 2018-12-12 RX ORDER — SPIRONOLACTONE 25 MG/1
TABLET ORAL
Qty: 15 TABLET | Refills: 0 | OUTPATIENT
Start: 2018-12-12

## 2018-12-12 RX ORDER — CARVEDILOL 12.5 MG/1
TABLET ORAL
Qty: 60 TABLET | Refills: 0 | OUTPATIENT
Start: 2018-12-12

## 2018-12-12 RX ORDER — ATORVASTATIN CALCIUM 20 MG/1
TABLET, FILM COATED ORAL
Qty: 30 TABLET | Refills: 0 | OUTPATIENT
Start: 2018-12-12

## 2019-01-09 ENCOUNTER — TELEPHONE (OUTPATIENT)
Dept: CARDIOLOGY CLINIC | Age: 49
End: 2019-01-09

## 2019-01-18 ENCOUNTER — HOSPITAL ENCOUNTER (OUTPATIENT)
Age: 49
Discharge: HOME | End: 2019-01-18
Payer: COMMERCIAL

## 2019-01-18 ENCOUNTER — OFFICE VISIT (OUTPATIENT)
Dept: CARDIOLOGY CLINIC | Age: 49
End: 2019-01-18
Payer: COMMERCIAL

## 2019-01-18 VITALS
HEART RATE: 78 BPM | SYSTOLIC BLOOD PRESSURE: 130 MMHG | DIASTOLIC BLOOD PRESSURE: 86 MMHG | BODY MASS INDEX: 48.82 KG/M2 | OXYGEN SATURATION: 94 % | WEIGHT: 293 LBS | HEIGHT: 65 IN

## 2019-01-18 DIAGNOSIS — I50.22 CHRONIC SYSTOLIC HEART FAILURE (HCC): ICD-10-CM

## 2019-01-18 DIAGNOSIS — I10 ESSENTIAL HYPERTENSION: Primary | ICD-10-CM

## 2019-01-18 DIAGNOSIS — G47.33 OSA (OBSTRUCTIVE SLEEP APNEA): ICD-10-CM

## 2019-01-18 DIAGNOSIS — E66.01 MORBID OBESITY (HCC): ICD-10-CM

## 2019-01-18 LAB
ANION GAP SERPL CALCULATED.3IONS-SCNC: 14 MMOL/L (ref 3–16)
BUN BLDV-MCNC: 15 MG/DL (ref 7–20)
CALCIUM SERPL-MCNC: 8.9 MG/DL (ref 8.3–10.6)
CHLORIDE BLD-SCNC: 99 MMOL/L (ref 99–110)
CO2: 30 MMOL/L (ref 21–32)
CREAT SERPL-MCNC: 0.7 MG/DL (ref 0.6–1.1)
GFR AFRICAN AMERICAN: >60
GFR NON-AFRICAN AMERICAN: >60
GLUCOSE BLD-MCNC: 91 MG/DL (ref 70–99)
POTASSIUM SERPL-SCNC: 4.5 MMOL/L (ref 3.5–5.1)
PRO-BNP: 120 PG/ML (ref 0–124)
SODIUM BLD-SCNC: 143 MMOL/L (ref 136–145)

## 2019-01-18 PROCEDURE — 99214 OFFICE O/P EST MOD 30 MIN: CPT | Performed by: CLINICAL NURSE SPECIALIST

## 2019-01-18 PROCEDURE — 36415 COLL VENOUS BLD VENIPUNCTURE: CPT

## 2019-01-18 PROCEDURE — 80048 BASIC METABOLIC PNL TOTAL CA: CPT

## 2019-01-18 PROCEDURE — 83880 ASSAY OF NATRIURETIC PEPTIDE: CPT

## 2019-01-18 RX ORDER — SPIRONOLACTONE 25 MG/1
12.5 TABLET ORAL DAILY
Qty: 15 TABLET | Refills: 3 | Status: CANCELLED | OUTPATIENT
Start: 2019-01-18

## 2019-01-18 RX ORDER — FUROSEMIDE 40 MG/1
TABLET ORAL
Qty: 90 TABLET | Refills: 3 | Status: CANCELLED | OUTPATIENT
Start: 2019-01-18

## 2019-01-18 RX ORDER — CARVEDILOL 12.5 MG/1
12.5 TABLET ORAL 2 TIMES DAILY
Qty: 60 TABLET | Refills: 1 | Status: SHIPPED | OUTPATIENT
Start: 2019-01-18 | End: 2019-03-05 | Stop reason: SDUPTHER

## 2019-01-18 RX ORDER — IRBESARTAN 150 MG/1
75 TABLET ORAL NIGHTLY
Qty: 30 TABLET | Refills: 0 | Status: SHIPPED | OUTPATIENT
Start: 2019-01-18 | End: 2019-03-03 | Stop reason: SDUPTHER

## 2019-01-18 RX ORDER — CARVEDILOL 12.5 MG/1
12.5 TABLET ORAL 2 TIMES DAILY
Qty: 60 TABLET | Refills: 3 | Status: CANCELLED | OUTPATIENT
Start: 2019-01-18

## 2019-01-18 RX ORDER — ASPIRIN 81 MG/1
TABLET, CHEWABLE ORAL
Qty: 30 TABLET | Refills: 0 | Status: CANCELLED | OUTPATIENT
Start: 2019-01-18

## 2019-01-18 RX ORDER — SPIRONOLACTONE 25 MG/1
12.5 TABLET ORAL DAILY
Qty: 15 TABLET | Refills: 0 | Status: SHIPPED | OUTPATIENT
Start: 2019-01-18 | End: 2019-02-22 | Stop reason: SDUPTHER

## 2019-01-18 RX ORDER — ATORVASTATIN CALCIUM 20 MG/1
TABLET, FILM COATED ORAL
Qty: 30 TABLET | Refills: 3 | Status: CANCELLED | OUTPATIENT
Start: 2019-01-18

## 2019-01-18 RX ORDER — IRBESARTAN 150 MG/1
75 TABLET ORAL NIGHTLY
Qty: 30 TABLET | Refills: 3 | Status: CANCELLED | OUTPATIENT
Start: 2019-01-18

## 2019-01-18 RX ORDER — FUROSEMIDE 40 MG/1
80 TABLET ORAL 2 TIMES DAILY
Qty: 120 TABLET | Refills: 0 | Status: SHIPPED | OUTPATIENT
Start: 2019-01-18 | End: 2019-03-03 | Stop reason: SDUPTHER

## 2019-01-21 ENCOUNTER — TELEPHONE (OUTPATIENT)
Dept: CARDIOLOGY CLINIC | Age: 49
End: 2019-01-21

## 2019-02-15 ENCOUNTER — HOSPITAL ENCOUNTER (OUTPATIENT)
Dept: SLEEP CENTER | Age: 49
Discharge: HOME | End: 2019-02-15
Payer: COMMERCIAL

## 2019-02-15 PROCEDURE — 95811 POLYSOM 6/>YRS CPAP 4/> PARM: CPT

## 2019-02-20 PROCEDURE — 95811 POLYSOM 6/>YRS CPAP 4/> PARM: CPT | Performed by: INTERNAL MEDICINE

## 2019-02-21 ENCOUNTER — TELEPHONE (OUTPATIENT)
Dept: PULMONOLOGY | Age: 49
End: 2019-02-21

## 2019-02-22 ENCOUNTER — OFFICE VISIT (OUTPATIENT)
Dept: CARDIOLOGY CLINIC | Age: 49
End: 2019-02-22
Payer: COMMERCIAL

## 2019-02-22 VITALS
HEART RATE: 85 BPM | OXYGEN SATURATION: 94 % | WEIGHT: 293 LBS | DIASTOLIC BLOOD PRESSURE: 80 MMHG | BODY MASS INDEX: 48.82 KG/M2 | SYSTOLIC BLOOD PRESSURE: 134 MMHG | HEIGHT: 65 IN

## 2019-02-22 DIAGNOSIS — I10 ESSENTIAL HYPERTENSION: ICD-10-CM

## 2019-02-22 DIAGNOSIS — E66.01 MORBID OBESITY WITH BODY MASS INDEX OF 70 AND OVER IN ADULT (HCC): ICD-10-CM

## 2019-02-22 DIAGNOSIS — I50.22 CHRONIC SYSTOLIC HEART FAILURE (HCC): Primary | ICD-10-CM

## 2019-02-22 DIAGNOSIS — G47.33 OSA (OBSTRUCTIVE SLEEP APNEA): ICD-10-CM

## 2019-02-22 PROCEDURE — 99214 OFFICE O/P EST MOD 30 MIN: CPT | Performed by: CLINICAL NURSE SPECIALIST

## 2019-02-22 RX ORDER — SPIRONOLACTONE 25 MG/1
25 TABLET ORAL DAILY
Qty: 30 TABLET | Refills: 0 | Status: SHIPPED | OUTPATIENT
Start: 2019-02-22 | End: 2019-07-11 | Stop reason: SDUPTHER

## 2019-03-04 ENCOUNTER — TELEPHONE (OUTPATIENT)
Dept: CARDIOLOGY CLINIC | Age: 49
End: 2019-03-04

## 2019-03-04 DIAGNOSIS — I50.22 CHRONIC SYSTOLIC HEART FAILURE (HCC): ICD-10-CM

## 2019-03-05 RX ORDER — CARVEDILOL 12.5 MG/1
12.5 TABLET ORAL 2 TIMES DAILY
Qty: 60 TABLET | Refills: 1 | Status: SHIPPED | OUTPATIENT
Start: 2019-03-05 | End: 2019-05-23 | Stop reason: SDUPTHER

## 2019-03-19 ENCOUNTER — TELEPHONE (OUTPATIENT)
Dept: BARIATRICS/WEIGHT MGMT | Age: 49
End: 2019-03-19

## 2019-04-16 ENCOUNTER — TELEPHONE (OUTPATIENT)
Dept: BARIATRICS/WEIGHT MGMT | Age: 49
End: 2019-04-16

## 2019-04-23 ENCOUNTER — OFFICE VISIT (OUTPATIENT)
Dept: BARIATRICS/WEIGHT MGMT | Age: 49
End: 2019-04-23
Payer: COMMERCIAL

## 2019-04-23 VITALS
RESPIRATION RATE: 18 BRPM | HEIGHT: 64 IN | SYSTOLIC BLOOD PRESSURE: 122 MMHG | DIASTOLIC BLOOD PRESSURE: 61 MMHG | OXYGEN SATURATION: 98 % | HEART RATE: 58 BPM | WEIGHT: 293 LBS | BODY MASS INDEX: 50.02 KG/M2

## 2019-04-23 DIAGNOSIS — G47.33 OBSTRUCTIVE SLEEP APNEA (ADULT) (PEDIATRIC): ICD-10-CM

## 2019-04-23 DIAGNOSIS — E66.01 MORBID OBESITY WITH BMI OF 70 AND OVER, ADULT (HCC): Primary | Chronic | ICD-10-CM

## 2019-04-23 DIAGNOSIS — E11.69 DIABETES MELLITUS TYPE 2 IN OBESE (HCC): ICD-10-CM

## 2019-04-23 DIAGNOSIS — E66.9 DIABETES MELLITUS TYPE 2 IN OBESE (HCC): ICD-10-CM

## 2019-04-23 DIAGNOSIS — I10 ESSENTIAL HYPERTENSION: Chronic | ICD-10-CM

## 2019-04-23 PROCEDURE — 99205 OFFICE O/P NEW HI 60 MIN: CPT | Performed by: SURGERY

## 2019-04-23 RX ORDER — NAPROXEN 500 MG/1
500 TABLET ORAL 2 TIMES DAILY WITH MEALS
COMMUNITY
End: 2019-10-28 | Stop reason: ALTCHOICE

## 2019-04-23 NOTE — PROGRESS NOTES
Mignon Plummer is a 46 y.o. female with a date of birth of 1970. Vitals:    04/23/19 1112   BP: 122/61   Pulse: 58   Resp: 18   SpO2: 98%    BMI: Body mass index is 73.21 kg/m². Obesity Classification: Class III    Weight History: Wt Readings from Last 3 Encounters:   04/23/19 (!) 426 lb 8 oz (193.5 kg)   02/22/19 (!) 423 lb (191.9 kg)   01/18/19 (!) 419 lb 3.2 oz (190.1 kg)       Patient's lowest adult weight was 377 lbs at age 50. Patient's highest adult weight was 426.5 lbs at age 46. Patient has participated in the following weight loss programs: Atkins Diet, Weight Watcher Anonymous, Metabolife, and [employer]. Patient has not participated in meal replacement/liquid diets. Patient has participated in weight loss medications - a few years ago from doctor in [place] but can't remember the med. Patient is  lactose intolerant. Patient does not have Faith/cultural food concerns. Patient does not have food allergies - may be allergic to shrimp. Patient is able to tolerate artificial sweeteners. Patient dines out to a sit down restaurant 1 times per month. Patient dines out to a fast food restaurant 7 times per week. Patient tries to have regular sleep/wake schedule. 24 hour recall/food frequency chart:  Breakfast: yes. McDonalds sausage, egg and cheese mcgriddle, hash brown, soda, coffee  Snack: no.   Lunch: yes. [employer] spicy chix sandwich or Outback ribeye, broccoli, mashed potatoes  Snack: yes. Orange OR popcorn OR ice cream  Dinner: yes. Popeyes Or Outback Or [employer] - sandwich, drink  Snack: yes. Ice cream  Drinks throughout the day: water, soda - orange or root beer, coffee occasionally  Do you drink alcohol? Yes. How often/how much alcohol do you drink: 4 Glasses of wine per year. Patient does not meet the criteria for binge eating disorder. Patient does not have grazing. Patient does not have night eating.  Patient does have a history of emotional eating or eating index is 73.21 kg/m². .    Will follow up as necessary.     Cecil Barclay

## 2019-04-23 NOTE — LETTER
[facility]  [address] [place] [address] [address]  [address] [address] [place]. [address]  Phone: [phone]  Fax: [phone]    Atif Baker MD        April 23, 2019     Salvatore Nesbitt  No address on file    Patient: Fatmata Gamble  MR Number: [id number]  YOB: 1970  Date of Visit: 4/23/2019    Dear Dr. Salvatore Nesbitt: Thank you for the request for consultation for Fatmata Gamble to me for the evaluation of obesity. Below are the relevant portions of my assessment and plan of care. Fatmata Gamble is a very pleasant 46 y.o. female with Obesity,  Body mass index is 73.21 kg/m². and multiple obesity related co-morbidities. Fatmata Gamble is very motivated to lose weight and being more healthy. We discussed how her weight affects her overall health including:  Patient Active Problem List   Diagnosis    Shortness of breath    Acute on chronic combined systolic and diastolic CHF (congestive heart failure) ([address]    Essential hypertension    Morbid obesity with BMI of 70 and over, adult ([address]    Obstructive sleep apnea (adult) (pediatric)    Acute pulmonary edema (HCC)    Acute systolic heart failure ([address]    Cardiomyopathy ([address]    Diabetes mellitus ([address]    Hypersomnia    Diabetes mellitus type 2 in obese ([address]      The patient underwent 30 minutes of dietary counseling. I have reviewed, discussed and agree with the dietary plan. Medical weight loss and different surgical options were discussed in details with patient. Malena is interested in surgical weight loss. Patient is interested in Laparoscopic Sleeve Gastrectomy, which I believe is an excellent option. We will proceed with pre-operative work up labs and studies. Will also petition patient's  insurance for approval for this procedure. I advised the patient that we can't guarantee final insurance approval.    Patient received dietary handouts and education.    Patient advised that its their responsibility to follow up for studies,
no

## 2019-04-23 NOTE — PROGRESS NOTES
[facility]) Physicians   [facility] [employer]  Mike Dixon MD, [facility], [address] [place] [address]    [address] [id number] . Phone: [phone]  Fax: [phone]       Chief Complaint   Patient presents with    Bariatric, Initial Visit     NP WLS BCMAGEN           HPI:    Erika Santiago is a very pleasant 46 y.o. obese female ,   Body mass index is 73.21 kg/m². And multiple medical problems who is presenting for weight loss surgery evaluation and consultation by Dr. Clif Travis. Patient has been struggling for several years now with obesity. Patient feels the weight is an obstacle to achieve and perform things in daily living as well risk on health. Tries to diet, and exercise but can't keep the weight off. Patient tried [employer], [employer], [employer], and [employer]. Patient has not participated in meal replacement/liquid diets.     Patient has participated in weight loss medications - a few years ago from doctor in [place] but can't remember the med and other regimens, but with no sustainable weight loss. Patient  is very determined to lose weight and be healthy, and is interested in surgical weight loss to achieve this goal.    Otherwise patient denies any nausea, vomiting, fevers, chills, shortness of breath, chest pain, constipation or urinary symptoms. Pain Assessment   Denies any abdominal pain     Past Medical History:   Diagnosis Date    Acute on chronic combined systolic and diastolic CHF (congestive heart failure) ([address] 1/26/2018    Asthma     Cardiomyopathy [facility]     Diabetes mellitus ([facility]     Heart murmur     Hypertension     Sleep apnea     Urinary incontinence      History reviewed. No pertinent surgical history.   Family History   Problem Relation Age of Onset    High Blood Pressure Mother     Other Mother         cardiac murmur    Arthritis Mother     Hypertension Mother     High Blood Pressure Father     Hypertension Father  Stroke Father     Arthritis Father     High Blood Pressure Sister     Diabetes Sister         prediabetic    Hypertension Sister     Hypertension Maternal Uncle     Cancer Maternal Uncle     Cancer Maternal Grandmother      Social History     Tobacco Use    Smoking status: Never Smoker    Smokeless tobacco: Never Used   Substance Use Topics    Alcohol use: No     I counseled the patient on the importance of not smoking and risks of ETOH. Allergies   Allergen Reactions    Levofloxacin Other (See Comments)     headache     Vitals:    04/23/19 1112   BP: 122/61   Pulse: 58   Resp: 18   SpO2: 98%   Weight: (!) 426 lb 8 oz (193.5 kg)   Height: 5' 4\" (1.626 m)       Body mass index is 73.21 kg/m².       Current Outpatient Medications:     naproxen (NAPROSYN) 500 MG tablet, Take 500 mg by mouth 2 times daily (with meals), Disp: , Rfl:     irbesartan (AVAPRO) 150 MG tablet, TAKE 1/2 TABLET BY MOUTH EVERY NIGHT, Disp: 30 tablet, Rfl: 0    atorvastatin (LIPITOR) 20 MG tablet, TAKE 1 TABLET BY MOUTH EVERY NIGHT, Disp: 30 tablet, Rfl: 0    carvedilol (COREG) 12.5 MG tablet, Take 1 tablet by mouth 2 times daily, Disp: 60 tablet, Rfl: 1    furosemide (LASIX) 40 MG tablet, TAKE 2 TABLETS BY MOUTH TWICE DAILY, Disp: 120 tablet, Rfl: 2    ASPIRIN LOW DOSE 81 MG chewable tablet, CHEW AND SWALLOW ONE TABLET DAILY, Disp: 30 tablet, Rfl: 2    spironolactone (ALDACTONE) 25 MG tablet, Take 1 tablet by mouth daily, Disp: 30 tablet, Rfl: 0    atorvastatin (LIPITOR) 20 MG tablet, TAKE 1 TABLET BY MOUTH EVERY NIGHT, Disp: 30 tablet, Rfl: 0    mometasone-formoterol (DULERA) 100-5 MCG/ACT inhaler, Inhale 2 puffs into the lungs 2 times daily, Disp: 1 Inhaler, Rfl: 0    metFORMIN (GLUCOPHAGE) 500 MG tablet, Take 1 tablet by mouth 2 times daily (with meals), Disp: 60 tablet, Rfl: 3    albuterol sulfate  (90 Base) MCG/ACT inhaler, Inhale 2 puffs into the lungs, Disp: , Rfl:     omeprazole (PRILOSEC) 40 MG delayed release capsule, Take 40 mg by mouth , Disp: , Rfl:     ketoconazole (NIZORAL) 2 % cream, Apply  topically daily. Apply topically daily. , Disp: , Rfl:     Alpha-D-Galactosidase (BEANO MELTAWAYS) TABS, Take 1 tablet by mouth, Disp: , Rfl:       Review of Systems - History obtained from the patient  General ROS: overweight   Psychological ROS: negative  Ophthalmic ROS: negative  Neurological ROS: negative  ENT ROS: negative  Allergy and Immunology ROS: negative  Hematological and Lymphatic ROS: negative  Endocrine ROS: overweight  Breast ROS: negative  Respiratory ROS: negative  Cardiovascular ROS: negative  Gastrointestinal ROS:negative  Genito-Urinary ROS: negative  Musculoskeletal ROS: weight effects on joints  Skin ROS: negative    Physical Exam   Constitutional: Patient is oriented to person, place, and time. Vital signs are normal. Patient  appears well-developed and well-nourished. Patient  is active and cooperative. Non-toxic appearance. No distress. HENT:   Head: Normocephalic and atraumatic. Head is without laceration. Right Ear: External ear normal. No lacerations. No drainage, swelling or tenderness. Left Ear: External ear normal. No lacerations. No drainage, swelling or tenderness. Nose: Nose normal. No nose lacerations or nasal deformity. Mouth/Throat: Uvula is midline, oropharynx is clear and moist and mucous membranes are normal. No oropharyngeal exudate. Eyes: Conjunctivae, EOM and lids are normal. Pupils are equal, round, and reactive to light. Right eye exhibits no discharge. No foreign body present in the right eye. Left eye exhibits no discharge. No foreign body present in the left eye. No scleral icterus. Neck: Trachea normal and normal range of motion. Neck supple. No JVD present. No tracheal tenderness present. Carotid bruit is not present. No rigidity. No tracheal deviation and no edema present. No thyromegaly present.    Cardiovascular: Normal rate, regular rhythm, normal heart sounds, intact distal pulses and normal pulses. Pulmonary/Chest: Effort normal and breath sounds normal. No stridor. No respiratory distress. Patient  has no wheezes. Patient has no rales. Patient exhibits no tenderness and no crepitus. Abdominal: Soft. Normal appearance and bowel sounds are normal. Patient exhibits no distension, no abdominal bruit, no ascites and no mass. There is no hepatosplenomegaly. There is no tenderness. There is no rigidity, no rebound, no guarding and no CVA tenderness. No hernia. Hernia confirmed negative in the ventral area. Musculoskeletal: Normal range of motion. Patient exhibits no edema or tenderness. Lymphadenopathy:        Head (right side): No submental, no submandibular, no preauricular, no posterior auricular and no occipital adenopathy present. Head (left side): No submental, no submandibular, no preauricular, no posterior auricular and no occipital adenopathy present. Patient  has no cervical adenopathy. Right: No supraclavicular adenopathy present. Left: No supraclavicular adenopathy present. Neurological: Patient is alert and oriented to person, place, and time. Patient has normal strength. Coordination and gait normal. GCS eye subscore is 4. GCS verbal subscore is 5. GCS motor subscore is 6. Skin: Skin is warm and dry. No abrasion and no rash noted. Patient  is not diaphoretic. No cyanosis or erythema. Psychiatric: Patient has a normal mood and affect. speech is normal and behavior is normal. Cognition and memory are normal.         Malena was seen today for bariatric, initial visit. Diagnoses and all orders for this visit:    Morbid obesity with BMI of 70 and over, adult [facility])  -     Ambulatory referral to Cardiology  -     CBC Auto Differential; Future  -     Comprehensive Metabolic Panel; Future  -     Hemoglobin A1C; Future  -     Iron and TIBC; Future  -     Lipid Panel; Future  -     TSH with Reflex;  Future  -     US Gallbladder Ruq; Future  -     Vitamin A; Future  -     Vitamin B1, Whole Blood; Future  -     Vitamin B12 & Folate; Future  -     Vitamin D 25 Hydroxy; Future  -     Vitamin E; Future  -     Vitamin K1; Future    Diabetes mellitus type 2 in obese [facility])  -     Ambulatory referral to Cardiology  -     CBC Auto Differential; Future  -     Comprehensive Metabolic Panel; Future  -     Hemoglobin A1C; Future  -     Iron and TIBC; Future  -     Lipid Panel; Future  -     TSH with Reflex; Future  -     US Gallbladder Ruq; Future  -     Vitamin A; Future  -     Vitamin B1, Whole Blood; Future  -     Vitamin B12 & Folate; Future  -     Vitamin D 25 Hydroxy; Future  -     Vitamin E; Future  -     Vitamin K1; Future    Obstructive sleep apnea (adult) (pediatric)  -     Ambulatory referral to Cardiology  -     CBC Auto Differential; Future  -     Comprehensive Metabolic Panel; Future  -     Hemoglobin A1C; Future  -     Iron and TIBC; Future  -     Lipid Panel; Future  -     TSH with Reflex; Future  -     US Gallbladder Ruq; Future  -     Vitamin A; Future  -     Vitamin B1, Whole Blood; Future  -     Vitamin B12 & Folate; Future  -     Vitamin D 25 Hydroxy; Future  -     Vitamin E; Future  -     Vitamin K1; Future    Essential hypertension  -     Ambulatory referral to Cardiology  -     CBC Auto Differential; Future  -     Comprehensive Metabolic Panel; Future  -     Hemoglobin A1C; Future  -     Iron and TIBC; Future  -     Lipid Panel; Future  -     TSH with Reflex; Future  -     US Gallbladder Ruq; Future  -     Vitamin A; Future  -     Vitamin B1, Whole Blood; Future  -     Vitamin B12 & Folate; Future  -     Vitamin D 25 Hydroxy; Future  -     Vitamin E; Future  -     Vitamin K1; Future          A/P  Letitia Liriano is a very pleasant 46 y.o. female with Obesity,  Body mass index is 73.21 kg/m². and multiple obesity related co-morbidities. Letitia Liriano is very motivated to lose weight and being more healthy.    We received dietary handouts and education. Labs ordered. Psych Evaluation. Cardiac Clearance. BIPAP Compliance. UltraSound Gallbladder. EGD (Upper Endoscopy). Support Group. PCP Letter. Quit Smoking,  Alcohol, Caffeine and Carbonated Drinks  Weight History 2 yrs. F/U in 4 weeks. BMI<65 , weight around 380          Patient advised that its their responsibility to follow up for studies, referrals and/or labs ordered today.

## 2019-05-14 ENCOUNTER — OFFICE VISIT (OUTPATIENT)
Dept: PULMONOLOGY | Age: 49
End: 2019-05-14
Payer: COMMERCIAL

## 2019-05-14 VITALS
DIASTOLIC BLOOD PRESSURE: 60 MMHG | HEART RATE: 96 BPM | OXYGEN SATURATION: 92 % | SYSTOLIC BLOOD PRESSURE: 132 MMHG | BODY MASS INDEX: 48.82 KG/M2 | HEIGHT: 65 IN | WEIGHT: 293 LBS

## 2019-05-14 DIAGNOSIS — G47.33 OBSTRUCTIVE SLEEP APNEA (ADULT) (PEDIATRIC): ICD-10-CM

## 2019-05-14 DIAGNOSIS — E11.8 TYPE 2 DIABETES MELLITUS WITH COMPLICATION, WITHOUT LONG-TERM CURRENT USE OF INSULIN (HCC): Chronic | ICD-10-CM

## 2019-05-14 DIAGNOSIS — I50.42 CHRONIC COMBINED SYSTOLIC AND DIASTOLIC CHF (CONGESTIVE HEART FAILURE) (HCC): Chronic | ICD-10-CM

## 2019-05-14 DIAGNOSIS — E66.01 MORBID OBESITY WITH BMI OF 70 AND OVER, ADULT (HCC): Chronic | ICD-10-CM

## 2019-05-14 DIAGNOSIS — I10 ESSENTIAL HYPERTENSION: Chronic | ICD-10-CM

## 2019-05-14 PROCEDURE — 99214 OFFICE O/P EST MOD 30 MIN: CPT | Performed by: INTERNAL MEDICINE

## 2019-05-14 RX ORDER — FAMOTIDINE 20 MG/1
20 TABLET, FILM COATED ORAL 2 TIMES DAILY PRN
Refills: 0 | COMMUNITY
Start: 2019-04-25 | End: 2021-09-10

## 2019-05-14 ASSESSMENT — ENCOUNTER SYMPTOMS
RHINORRHEA: 0
APNEA: 0
CHOKING: 0
COUGH: 0
SHORTNESS OF BREATH: 0

## 2019-05-14 ASSESSMENT — SLEEP AND FATIGUE QUESTIONNAIRES
HOW LIKELY ARE YOU TO NOD OFF OR FALL ASLEEP IN A CAR, WHILE STOPPED FOR A FEW MINUTES IN TRAFFIC: 0
HOW LIKELY ARE YOU TO NOD OFF OR FALL ASLEEP WHILE SITTING AND READING: 2
HOW LIKELY ARE YOU TO NOD OFF OR FALL ASLEEP WHEN YOU ARE A PASSENGER IN A CAR FOR AN HOUR WITHOUT A BREAK: 1
HOW LIKELY ARE YOU TO NOD OFF OR FALL ASLEEP WHILE SITTING AND TALKING TO SOMEONE: 0
HOW LIKELY ARE YOU TO NOD OFF OR FALL ASLEEP WHILE LYING DOWN TO REST IN THE AFTERNOON WHEN CIRCUMSTANCES PERMIT: 3
HOW LIKELY ARE YOU TO NOD OFF OR FALL ASLEEP WHILE WATCHING TV: 1
HOW LIKELY ARE YOU TO NOD OFF OR FALL ASLEEP WHILE SITTING QUIETLY AFTER LUNCH WITHOUT ALCOHOL: 1
HOW LIKELY ARE YOU TO NOD OFF OR FALL ASLEEP WHILE SITTING INACTIVE IN A PUBLIC PLACE: 0
ESS TOTAL SCORE: 8

## 2019-05-14 NOTE — PROGRESS NOTES
4.5 hrs/night  Download AHI (/hour): 0.6 /HR  Average IPAP Pressure: 20.1 cmH2O  Average EPAP Pressure: 13 cmH2O AUTO BIPAP - Settings ([place]les)  IPAP Max: 25 cmH2O  EPAP Min: 13 cmH2O  Pressure Support Min: 6  Pressure Support Max: 8   Comfort Settings  Humidity Level (0-8): 3  Heated Tubing (Yes/No): Yes  Flex/EPR (0-3): 3 PAP Mask  Mask Type: Full Face mask  Mask Model: Simolus   Mask Size: ML     TRUE: polysomngraphy done on 6/20/18 showed AHI-49.2/hr with low sat-67%. Then had CPAP titration done on 2/15/19 and failed CPAP and was changed to bilevel. Has only been on her machine for 17 day, had a \"hair style\" that \"did not work with the [employer]". Having issues with dryness but has not adjusted humidity. Pressure feels okay, not having SOB nor aerophagia. Feels more alert, not napping at work since being on her machine. CHF, hypertension, diabetes mellitus, and obesity : stable on meds and followed by pt's PCP and other physicians.     [address]    [place] - Total score: 8    Social History     Socioeconomic History    Marital status: Single     Spouse name: Not on file    Number of children: 3    Years of education: Not on file    Highest education level: Not on file   Occupational History    Not on file   Social Needs    Financial resource strain: Not on file    Food insecurity:     Worry: Not on file     Inability: Not on file    Transportation needs:     Medical: Not on file     Non-medical: Not on file   Tobacco Use    Smoking status: Never Smoker    Smokeless tobacco: Never Used   Substance and Sexual Activity    Alcohol use: No    Drug use: No    Sexual activity: Never   Lifestyle    Physical activity:     Days per week: Not on file     Minutes per session: Not on file    Stress: Not on file   Relationships    Social connections:     Talks on phone: Not on file     Gets together: Not on file     Attends Bahai service: Not on file     Active member of club or organization: Not on file     Attends meetings of clubs or organizations: Not on file     Relationship status: Not on file    Intimate partner violence:     Fear of current or ex partner: Not on file     Emotionally abused: Not on file     Physically abused: Not on file     Forced sexual activity: Not on file   Other Topics Concern    Not on file   Social History Narrative    Not on file       Current Outpatient Medications   Medication Sig Dispense Refill    famotidine (PEPCID) 20 MG tablet TK 1 T PO BID PRF HEARTBURN  0    naproxen (NAPROSYN) 500 MG tablet Take 500 mg by mouth 2 times daily (with meals)      irbesartan (AVAPRO) 150 MG tablet TAKE 1/2 TABLET BY MOUTH EVERY NIGHT 30 tablet 0    atorvastatin (LIPITOR) 20 MG tablet TAKE 1 TABLET BY MOUTH EVERY NIGHT 30 tablet 0    carvedilol (COREG) 12.5 MG tablet Take 1 tablet by mouth 2 times daily 60 tablet 1    furosemide (LASIX) 40 MG tablet TAKE 2 TABLETS BY MOUTH TWICE DAILY 120 tablet 2    ASPIRIN LOW DOSE 81 MG chewable tablet CHEW AND SWALLOW ONE TABLET DAILY 30 tablet 2    spironolactone (ALDACTONE) 25 MG tablet Take 1 tablet by mouth daily 30 tablet 0    atorvastatin (LIPITOR) 20 MG tablet TAKE 1 TABLET BY MOUTH EVERY NIGHT 30 tablet 0    Alpha-D-Galactosidase (BEANO MELTAWAYS) TABS Take 1 tablet by mouth      mometasone-formoterol (DULERA) 100-5 MCG/ACT inhaler Inhale 2 puffs into the lungs 2 times daily 1 Inhaler 0    metFORMIN (GLUCOPHAGE) 500 MG tablet Take 1 tablet by mouth 2 times daily (with meals) 60 tablet 3    albuterol sulfate  (90 Base) MCG/ACT inhaler Inhale 2 puffs into the lungs      omeprazole (PRILOSEC) 40 MG delayed release capsule Take 40 mg by mouth       ketoconazole (NIZORAL) 2 % cream Apply  topically daily. Apply topically daily. No current facility-administered medications for this visit.         Allergies as of 05/14/2019 - Review Complete 05/14/2019   Allergen Reaction Noted    Levofloxacin

## 2019-05-20 ASSESSMENT — ENCOUNTER SYMPTOMS
ALLERGIC/IMMUNOLOGIC NEGATIVE: 1
GASTROINTESTINAL NEGATIVE: 1
RESPIRATORY NEGATIVE: 1
EYES NEGATIVE: 1

## 2019-05-20 NOTE — PROGRESS NOTES
[facility]) Physicians   [facility]    Subjective:      Patient ID: Rohan Ken is a 46 y.o. female    HPI    The patient is here for their bariatric surgery presurgical visit. She has made several attempts at weight loss in the past without success and now wishes to pursue bariatric surgery. She is working to change her dietary behaviors and lose weight to improve comorbid conditions such as hypertension, diabetes mellitus, obstructive sleep apnea. Rohan Ken is a very pleasant 46 y.o. female with Body mass index is 74.93 kg/m². Past Medical History:   Diagnosis Date    Acute on chronic combined systolic and diastolic CHF (congestive heart failure) ([address] 1/26/2018    Asthma     Cardiomyopathy [facility]     Diabetes mellitus ([facility]     Heart murmur     Hypertension     Obstructive sleep apnea (adult) (pediatric) 1/26/2018    Sleep apnea     Urinary incontinence      History reviewed. No pertinent surgical history. Family History   Problem Relation Age of Onset    High Blood Pressure Mother     Other Mother         cardiac murmur    Arthritis Mother     Hypertension Mother     High Blood Pressure Father     Hypertension Father     Stroke Father     Arthritis Father     High Blood Pressure Sister     Diabetes Sister         prediabetic    Hypertension Sister     Hypertension Maternal Uncle     Cancer Maternal Uncle     Cancer Maternal Grandmother      Social History     Tobacco Use    Smoking status: Never Smoker    Smokeless tobacco: Never Used   Substance Use Topics    Alcohol use: No     I counseled the patient on the importance of not smoking and risks of ETOH. Allergies   Allergen Reactions    Levofloxacin Other (See Comments)     headache     Vitals:    05/24/19 0925   BP: 114/60   Pulse: 90   SpO2: 98%   Weight: (!) 436 lb 8 oz (198 kg)   Height: 5' 4\" (1.626 m)     Body mass index is 74.93 kg/m².     Current Outpatient Medications:     hydrOXYzine 2.7 02/01/2018    LYMPHSABS 2.6 02/01/2018    MONOSABS 0.4 02/01/2018    EOSABS 0.1 02/01/2018     Lab Results   Component Value Date     01/18/2019    K 4.5 01/18/2019    CL 99 01/18/2019    CO2 30 01/18/2019    ANIONGAP 14 01/18/2019    GLUCOSE 91 01/18/2019    BUN 15 01/18/2019    CREATININE 0.7 01/18/2019    LABGLOM >60 01/18/2019    GFRAA >60 01/18/2019    CALCIUM 8.9 01/18/2019    PROT 7.0 01/25/2018    LABALBU 3.5 02/01/2018    AGRATIO 1.3 01/25/2018    BILITOT 1.2 01/25/2018    ALKPHOS 128 01/25/2018    ALT 30 01/25/2018    AST 28 01/25/2018    GLOB 3.0 01/25/2018     Lab Results   Component Value Date    CHOL 138 01/26/2018    TRIG 47 01/26/2018    HDL 46 01/26/2018    LDLCALC 83 01/26/2018    LABVLDL 9 01/26/2018     No results found for: TSHREFLEX  No results found for: IRON, TIBC, LABIRON  No results found for: QYPPTYNG78, FOLATE  No results found for: VITD25  Lab Results   Component Value Date    LABA1C 6.6 01/26/2018    .7 01/26/2018     Review of Systems   Constitutional: Negative. HENT: Negative. Eyes: Negative. Respiratory: Negative. Cardiovascular: Positive for leg swelling. Gastrointestinal: Negative. Endocrine: Negative. Genitourinary: Negative. Musculoskeletal: Positive for arthralgias. Knees   Skin: Negative. Allergic/Immunologic: Negative. Neurological: Negative. Hematological: Negative. Psychiatric/Behavioral: Negative. Objective:   Physical Exam   Constitutional: She is oriented to person, place, and time. She appears well-developed and well-nourished. HENT:   Head: Normocephalic and atraumatic. Eyes: Pupils are equal, round, and reactive to light. Conjunctivae are normal.   Neck: Normal range of motion. Neck supple. Cardiovascular: Normal rate. Pulmonary/Chest: Effort normal.   Abdominal: Soft. Musculoskeletal: Normal range of motion. She exhibits edema.    Lower extremity   Neurological: She is alert and oriented to person, place, and time. Skin: Skin is warm and dry. Psychiatric: She has a normal mood and affect. Her behavior is normal. Judgment and thought content normal.   Vitals reviewed. Assessment and Plan:   Patient is here for their 2nd presurgery visit for sleeve, up 10 lbs. The patient's current Body mass index is 74.93 kg/m². (5/24/19). She is working on making dietary and behavior modifications. She has eliminated coffee and reduced her soda, but needs to eliminate along with sweet tea. She has reduced some of her eating out, but discussed drastically reducing her eating out to help with weight loss. She did meet with the registered dietitian for continued follow up. I agree with recommendations and plan. She is limited with exercising due to knee pain and shortness of breath. Encouraged physical activity as able. Discussed preop work up which still needs BMI <65, GBUS, cardiac clearance (reprinted referral), sleep clearance (BIPAP), labs, weight history, sample protein, EGD, psych evaluation, PCP letter and support group. We will see her back in 1 month for continued follow up. A total of 15 minutes was spent face-to-face with the patient and over half of that time was spent counseling the patient on proper dietary behaviors, exercise and preoperative work-up.

## 2019-05-23 ENCOUNTER — TELEPHONE (OUTPATIENT)
Dept: CARDIOLOGY CLINIC | Age: 49
End: 2019-05-23

## 2019-05-23 DIAGNOSIS — I50.22 CHRONIC SYSTOLIC HEART FAILURE (HCC): ICD-10-CM

## 2019-05-23 NOTE — TELEPHONE ENCOUNTER
I called patient as we received two more  requests from [facility] today. Patient answered and I informed her we need her fasting labs to be done (orders in Epic) from Dr. Becky Krause and NPRG (BNP) in order to fill prescriptions. She stated she sees Dr. Becky Krause tomorrow and will get the labs done. Also reminded that FU due. Will pend scripts for refill once labs are received.

## 2019-05-24 ENCOUNTER — OFFICE VISIT (OUTPATIENT)
Dept: BARIATRICS/WEIGHT MGMT | Age: 49
End: 2019-05-24
Payer: COMMERCIAL

## 2019-05-24 VITALS
WEIGHT: 293 LBS | DIASTOLIC BLOOD PRESSURE: 60 MMHG | BODY MASS INDEX: 50.02 KG/M2 | HEART RATE: 90 BPM | HEIGHT: 64 IN | SYSTOLIC BLOOD PRESSURE: 114 MMHG | OXYGEN SATURATION: 98 %

## 2019-05-24 DIAGNOSIS — E66.01 MORBID OBESITY WITH BMI OF 70 AND OVER, ADULT (HCC): Chronic | ICD-10-CM

## 2019-05-24 DIAGNOSIS — I50.22 CHRONIC SYSTOLIC HEART FAILURE (HCC): ICD-10-CM

## 2019-05-24 DIAGNOSIS — E11.69 DIABETES MELLITUS TYPE 2 IN OBESE (HCC): ICD-10-CM

## 2019-05-24 DIAGNOSIS — G47.33 OBSTRUCTIVE SLEEP APNEA (ADULT) (PEDIATRIC): ICD-10-CM

## 2019-05-24 DIAGNOSIS — I10 ESSENTIAL HYPERTENSION: Primary | Chronic | ICD-10-CM

## 2019-05-24 DIAGNOSIS — E66.9 DIABETES MELLITUS TYPE 2 IN OBESE (HCC): ICD-10-CM

## 2019-05-24 DIAGNOSIS — I10 ESSENTIAL HYPERTENSION: Chronic | ICD-10-CM

## 2019-05-24 LAB
A/G RATIO: 1.7 (ref 1.1–2.2)
ALBUMIN SERPL-MCNC: 4.3 G/DL (ref 3.4–5)
ALP BLD-CCNC: 141 U/L (ref 40–129)
ALT SERPL-CCNC: 15 U/L (ref 10–40)
ANION GAP SERPL CALCULATED.3IONS-SCNC: 13 MMOL/L (ref 3–16)
AST SERPL-CCNC: 15 U/L (ref 15–37)
BASOPHILS ABSOLUTE: 0 K/UL (ref 0–0.2)
BASOPHILS RELATIVE PERCENT: 0.2 %
BILIRUB SERPL-MCNC: 0.5 MG/DL (ref 0–1)
BUN BLDV-MCNC: 12 MG/DL (ref 7–20)
CALCIUM SERPL-MCNC: 9.5 MG/DL (ref 8.3–10.6)
CHLORIDE BLD-SCNC: 101 MMOL/L (ref 99–110)
CHOLESTEROL, TOTAL: 149 MG/DL (ref 0–199)
CO2: 28 MMOL/L (ref 21–32)
CREAT SERPL-MCNC: 0.7 MG/DL (ref 0.6–1.1)
EOSINOPHILS ABSOLUTE: 0.2 K/UL (ref 0–0.6)
EOSINOPHILS RELATIVE PERCENT: 3.6 %
ESTIMATED AVERAGE GLUCOSE: 165.7 MG/DL
FOLATE: 10.59 NG/ML (ref 4.78–24.2)
GFR AFRICAN AMERICAN: >60
GFR NON-AFRICAN AMERICAN: >60
GLOBULIN: 2.6 G/DL
GLUCOSE BLD-MCNC: 117 MG/DL (ref 70–99)
HBA1C MFR BLD: 7.4 %
HCT VFR BLD CALC: 37.7 % (ref 36–48)
HDLC SERPL-MCNC: 53 MG/DL (ref 40–60)
HEMOGLOBIN: 11.7 G/DL (ref 12–16)
IRON SATURATION: 15 % (ref 15–50)
IRON: 56 UG/DL (ref 37–145)
LDL CHOLESTEROL CALCULATED: 83 MG/DL
LYMPHOCYTES ABSOLUTE: 1.7 K/UL (ref 1–5.1)
LYMPHOCYTES RELATIVE PERCENT: 33 %
MCH RBC QN AUTO: 26.1 PG (ref 26–34)
MCHC RBC AUTO-ENTMCNC: 31.1 G/DL (ref 31–36)
MCV RBC AUTO: 84.1 FL (ref 80–100)
MONOCYTES ABSOLUTE: 0.3 K/UL (ref 0–1.3)
MONOCYTES RELATIVE PERCENT: 5.9 %
NEUTROPHILS ABSOLUTE: 3 K/UL (ref 1.7–7.7)
NEUTROPHILS RELATIVE PERCENT: 57.3 %
PDW BLD-RTO: 16.2 % (ref 12.4–15.4)
PLATELET # BLD: 280 K/UL (ref 135–450)
PMV BLD AUTO: 8.7 FL (ref 5–10.5)
POTASSIUM SERPL-SCNC: 4.3 MMOL/L (ref 3.5–5.1)
PRO-BNP: 85 PG/ML (ref 0–124)
RBC # BLD: 4.49 M/UL (ref 4–5.2)
SODIUM BLD-SCNC: 142 MMOL/L (ref 136–145)
TOTAL IRON BINDING CAPACITY: 370 UG/DL (ref 260–445)
TOTAL PROTEIN: 6.9 G/DL (ref 6.4–8.2)
TRIGL SERPL-MCNC: 65 MG/DL (ref 0–150)
TSH REFLEX: 2.01 UIU/ML (ref 0.27–4.2)
VITAMIN B-12: 671 PG/ML (ref 211–911)
VITAMIN D 25-HYDROXY: 17.5 NG/ML
VLDLC SERPL CALC-MCNC: 13 MG/DL
WBC # BLD: 5.2 K/UL (ref 4–11)

## 2019-05-24 PROCEDURE — 99213 OFFICE O/P EST LOW 20 MIN: CPT | Performed by: NURSE PRACTITIONER

## 2019-05-24 RX ORDER — HYDROXYZINE HYDROCHLORIDE 25 MG/1
25 TABLET, FILM COATED ORAL EVERY 6 HOURS PRN
COMMUNITY
Start: 2019-04-25 | End: 2020-05-08 | Stop reason: ALTCHOICE

## 2019-05-24 NOTE — PROGRESS NOTES
Malena Woodson gained 10 lbs over past month. Pt is lactose intolerant- tolerates cheese,  Sweetened almond milk, does not like yogurt. Pt may be allergic to shrimp. Reports she needs more guidance on healthy food options    Breakfast: eggs with piece of sausage and sweet tea     Snack: usually skips OR PB crackers OR popcorn     Lunch: Frozen meal - chicken/beef with rice/noodles with broccoli/cauliflower/carrots/zucchini/squash with water sometimes with piece of chocolate and caramel     Snack: not usually, otherwise might have popcorn    Dinner: air fried/baked chicken with veggies (broccoli/cauliflower/carrots/zucchini/squash) with water    Snack: tries to avoid mostly, OR might have wendys chicken nuggets (eats when takes some to her son at work) OR baked potato with butter/sour cream OR side salad     Fluids: water, eliminated soda but now compensating with sweet tea, eliminated coffee frappe from [employer] occasionally, 2 wine coolers, sweetened almond milk     Is pt consuming smaller portions? Being more mindful of this and eating until satisfied, but still needs to decrease protein and CHO     Is pt consuming at least 64 oz of fluids per day? 48-64 oz/day     Is pt consuming carbonated, caffeinated, or sugary beverages? yes    Has pt sampled Unjury and/or Nectar protein? Not yet     Exercise: Knee hurts. Out of breath. Holding more fluid in her legs.      Plan/Recommendations:   Eliminate junk food- choose healthier options when eating out, focus on meal planning and prepping  Eliminate liquid kcals - switch to unsweetened tea with sweeteners  Decrease CHO and protein  Portions   Try protein powder   Walking as tolerated   Start 2000 kcal meal plan     Handouts: presurgical diet eating guide, 2000 kcal meal plan and sample menu, dining out handout, new pt packet, frozen meals     Mattie Form

## 2019-05-28 ENCOUNTER — TELEPHONE (OUTPATIENT)
Dept: CARDIOLOGY CLINIC | Age: 49
End: 2019-05-28

## 2019-05-28 LAB
ALPHA-TOCOPHEROL: 5.6 MG/L (ref 5.5–18)
GAMMA-TOCOPHEROL: 1.8 MG/L (ref 0–6)
RETINYL PALMITATE: <0.02 MG/L (ref 0–0.1)
VITAMIN A LEVEL: 0.42 MG/L (ref 0.3–1.2)
VITAMIN A, INTERP: NORMAL
VITAMIN K1: 0.38 NMOL/L (ref 0.22–4.88)

## 2019-05-28 RX ORDER — ATORVASTATIN CALCIUM 20 MG/1
TABLET, FILM COATED ORAL
Qty: 30 TABLET | Refills: 0 | Status: SHIPPED | OUTPATIENT
Start: 2019-05-28 | End: 2020-07-31 | Stop reason: SDUPTHER

## 2019-05-28 RX ORDER — CARVEDILOL 12.5 MG/1
12.5 TABLET ORAL 2 TIMES DAILY
Qty: 60 TABLET | Refills: 0 | Status: SHIPPED | OUTPATIENT
Start: 2019-05-28 | End: 2019-06-28

## 2019-05-28 RX ORDER — IRBESARTAN 150 MG/1
TABLET ORAL
Qty: 45 TABLET | Refills: 0 | Status: SHIPPED | OUTPATIENT
Start: 2019-05-28 | End: 2019-10-28 | Stop reason: SDUPTHER

## 2019-05-28 NOTE — TELEPHONE ENCOUNTER
----- Message from ADDI Rajan - CNS sent at 5/28/2019 10:14 AM EDT -----  Labs are good  She needs to make an appt  thanks

## 2019-05-28 NOTE — TELEPHONE ENCOUNTER
Patient was made aware last week that a follow up is due. [facility] made aware of this, do not need to re-call.

## 2019-05-29 LAB — VITAMIN B1 WHOLE BLOOD: 57 NMOL/L (ref 70–180)

## 2019-05-31 ENCOUNTER — TELEPHONE (OUTPATIENT)
Dept: BARIATRICS/WEIGHT MGMT | Age: 49
End: 2019-05-31

## 2019-05-31 DIAGNOSIS — E55.9 VITAMIN D DEFICIENCY: Primary | ICD-10-CM

## 2019-05-31 RX ORDER — ERGOCALCIFEROL 1.25 MG/1
50000 CAPSULE ORAL WEEKLY
Qty: 4 CAPSULE | Refills: 2 | Status: SHIPPED | OUTPATIENT
Start: 2019-05-31 | End: 2019-10-28 | Stop reason: ALTCHOICE

## 2019-06-10 NOTE — TELEPHONE ENCOUNTER
Last seen on 02/22/2019  Next appt on - none on file           Return in about 2 months (around 4/22/2019). 1.  Proud of you doing the sleep evaluation  2. Referral to weight management solutions  3. Increase aldactone to 25 mg daily  4. Make a better effort with taking lasix 80 mg twice a day  5. Check blood work in 2 weeks  6.   RTO in 2 months             We did increase the medication to 25mg daily however was told to follow up in 2 months    Is trying to get a 90 day to mail order please advise

## 2019-06-10 NOTE — TELEPHONE ENCOUNTER
LVM informing patient she needs to come in for an appointment; requested she call our office to schedule so that medication can be refilled.

## 2019-06-10 NOTE — TELEPHONE ENCOUNTER
Medication Refill    When was your last appointment with cardiology?      (if  it's been more than 1 year, please go ahead and schedule an appointment with the physician while you have the patient on the phone)      Medication needing refilled:  spironolactone (ALDACTONE) 25 MG tablet    Doseage of the medication:    How are you taking this medication (QD, BID, TID, QID, PRN):  Take 1 tablet by mouth daily  Patient want a 30 or 90 day supply called in:  30  Which Pharmacy are we sending the medication to  [address] [place] [address]    Medication Question/Concern    (if  it's been more than 1 year, please go ahead and schedule an appointment with the physician while you have the patient on the phone)    What is the name of the medication you need to speak with someone about? Doseage of the medication:    How are you taking this medication:    What issues/concerns are you having with this medication:      Medication Samples    (if  it's been more than 1 year, please go ahead and schedule an appointment with the physician while you have the patient on the phone)    Medication:    Doseage of the medication:    How are you taking this medication (QD, BID, TID, QID, PRN):     in the office or Mail to your home?

## 2019-06-13 ENCOUNTER — TELEPHONE (OUTPATIENT)
Dept: CARDIOLOGY CLINIC | Age: 49
End: 2019-06-13

## 2019-06-13 NOTE — TELEPHONE ENCOUNTER
She had labs done (by another physician) 5/24/19. Do you want her to have any before her 6/28/19 FU?

## 2019-06-18 ASSESSMENT — ENCOUNTER SYMPTOMS
EYES NEGATIVE: 1
RESPIRATORY NEGATIVE: 1
ALLERGIC/IMMUNOLOGIC NEGATIVE: 1
GASTROINTESTINAL NEGATIVE: 1

## 2019-06-18 NOTE — PROGRESS NOTES
[facility]) Physicians   [employer] [facility] [employer]    Subjective:      Patient ID: Zoe Amaral is a 46 y.o. female    HPI    The patient is here for their bariatric surgery presurgical visit. She has made several attempts at weight loss in the past without success and now wishes to pursue bariatric surgery. She is working to change her dietary behaviors and lose weight to improve comorbid conditions such as hypertension, diabetes mellitus and obstructive sleep apnea. Zoe Amaral is a very pleasant 46 y.o. female with Body mass index is 73.23 kg/m². Past Medical History:   Diagnosis Date    Acute on chronic combined systolic and diastolic CHF (congestive heart failure) ([facility] 1/26/2018    Asthma     Cardiomyopathy ([facility]     Diabetes mellitus ([facility]     Heart murmur     Hypertension     Obstructive sleep apnea (adult) (pediatric) 1/26/2018    Sleep apnea     Urinary incontinence      History reviewed. No pertinent surgical history. Family History   Problem Relation Age of Onset    High Blood Pressure Mother     Other Mother         cardiac murmur    Arthritis Mother     Hypertension Mother     High Blood Pressure Father     Hypertension Father     Stroke Father     Arthritis Father     High Blood Pressure Sister     Diabetes Sister         prediabetic    Hypertension Sister     Hypertension Maternal Uncle     Cancer Maternal Uncle     Cancer Maternal Grandmother      Social History     Tobacco Use    Smoking status: Never Smoker    Smokeless tobacco: Never Used   Substance Use Topics    Alcohol use: No     I counseled the patient on the importance of not smoking and risks of ETOH. Allergies   Allergen Reactions    Levofloxacin Other (See Comments)     headache     Vitals:    06/28/19 0935   BP: 111/67   Pulse: 75   Resp: 18   SpO2: 98%   Weight: (!) 426 lb 9.6 oz (193.5 kg)   Height: 5' 4\" (1.626 m)     Body mass index is 73.23 kg/m².     Current Outpatient Medications:    Conjunctivae are normal.   Neck: Normal range of motion. Neck supple. Cardiovascular: Normal rate. Pulmonary/Chest: Effort normal.   Abdominal: Soft. Musculoskeletal: Normal range of motion. Neurological: She is alert and oriented to person, place, and time. Skin: Skin is warm and dry. Psychiatric: She has a normal mood and affect. Her behavior is normal. Judgment and thought content normal.   Vitals reviewed. Assessment and Plan:   Patient is here for their 3rd presurgery visit for sleeve, down 6.6 lbs. The patient's current Body mass index is 73.23 kg/m². (6/28/19). She is making dietary and behavior modifications. Discussed maintaining consistency with her behavior changes and eliminating caffeine and soda. She did meet with the registered dietitian for continued follow up. I agree with recommendations and plan. She is exercising with a little more walking and daily movement. Encouraged physical activity as tolerated. Discussed preop work up which still needs psych evaluation, support group, PCP letter, BMI <65, GBUS, cardiac clearance, sleep clearance, labs, weight history and EGD. We will see her back in 1 month for continued follow up. A total of 15 minutes was spent face-to-face with the patient and over half of that time was spent counseling the patient on proper dietary behaviors, exercise and preoperative work-up.

## 2019-06-28 ENCOUNTER — TELEPHONE (OUTPATIENT)
Dept: CARDIOLOGY CLINIC | Age: 49
End: 2019-06-28

## 2019-06-28 ENCOUNTER — OFFICE VISIT (OUTPATIENT)
Dept: BARIATRICS/WEIGHT MGMT | Age: 49
End: 2019-06-28
Payer: COMMERCIAL

## 2019-06-28 ENCOUNTER — OFFICE VISIT (OUTPATIENT)
Dept: CARDIOLOGY CLINIC | Age: 49
End: 2019-06-28
Payer: COMMERCIAL

## 2019-06-28 VITALS
HEIGHT: 64 IN | SYSTOLIC BLOOD PRESSURE: 111 MMHG | DIASTOLIC BLOOD PRESSURE: 67 MMHG | WEIGHT: 293 LBS | HEART RATE: 75 BPM | BODY MASS INDEX: 50.02 KG/M2 | RESPIRATION RATE: 18 BRPM | OXYGEN SATURATION: 98 %

## 2019-06-28 VITALS
DIASTOLIC BLOOD PRESSURE: 76 MMHG | OXYGEN SATURATION: 91 % | SYSTOLIC BLOOD PRESSURE: 110 MMHG | HEART RATE: 99 BPM | WEIGHT: 293 LBS | BODY MASS INDEX: 50.02 KG/M2 | HEIGHT: 64 IN

## 2019-06-28 DIAGNOSIS — G47.33 OSA (OBSTRUCTIVE SLEEP APNEA): ICD-10-CM

## 2019-06-28 DIAGNOSIS — E11.69 DIABETES MELLITUS TYPE 2 IN OBESE (HCC): ICD-10-CM

## 2019-06-28 DIAGNOSIS — E66.01 MORBID OBESITY WITH BMI OF 70 AND OVER, ADULT (HCC): Chronic | ICD-10-CM

## 2019-06-28 DIAGNOSIS — I50.22 CHRONIC SYSTOLIC HEART FAILURE (HCC): Primary | ICD-10-CM

## 2019-06-28 DIAGNOSIS — E66.01 MORBID OBESITY (HCC): ICD-10-CM

## 2019-06-28 DIAGNOSIS — I10 ESSENTIAL HYPERTENSION: ICD-10-CM

## 2019-06-28 DIAGNOSIS — I10 ESSENTIAL HYPERTENSION: Primary | Chronic | ICD-10-CM

## 2019-06-28 DIAGNOSIS — E66.9 DIABETES MELLITUS TYPE 2 IN OBESE (HCC): ICD-10-CM

## 2019-06-28 DIAGNOSIS — G47.33 OBSTRUCTIVE SLEEP APNEA (ADULT) (PEDIATRIC): ICD-10-CM

## 2019-06-28 PROCEDURE — 99213 OFFICE O/P EST LOW 20 MIN: CPT | Performed by: NURSE PRACTITIONER

## 2019-06-28 PROCEDURE — 93000 ELECTROCARDIOGRAM COMPLETE: CPT | Performed by: CLINICAL NURSE SPECIALIST

## 2019-06-28 PROCEDURE — 99214 OFFICE O/P EST MOD 30 MIN: CPT | Performed by: CLINICAL NURSE SPECIALIST

## 2019-06-28 RX ORDER — CARVEDILOL 12.5 MG/1
25 TABLET ORAL 2 TIMES DAILY
Qty: 60 TABLET | Refills: 0
Start: 2019-06-28 | End: 2019-10-28 | Stop reason: ALTCHOICE

## 2019-06-28 NOTE — PROGRESS NOTES
Malena Woodson lost 6.6 lbs over 1 month. Breakfast: 2 Eggs + 3 slices coley OR 2 strawberry uncrustables    Lunch: Healthy choice    Snack: No d/t not packing/ limited funds    Dinner: Fish sandwich on bottom bun w/ tartar sauce + water OR [employer]er 2 chix thighs + ~1 cup zucchini (made w/ garlic butter)/broccoli/cauliflower/carrots    Snack: Popcorn + 6 PB crackers     Is pt consuming smaller portions? yes taking less/ using prepackaged items    Is pt consuming at least 64 oz of fluids per day? yes at least 4 bottles tam/crystal light     Is pt consuming carbonated, caffeinated, or sugary beverages? yes pop 1 large cup in past month + 2 frappe. Mostly eliminated coffee/sweet tea/juice    Has pt sampled Unjury and/or Nectar protein?  Yes    Exercise: Has started parking farther away/walking more    Plan/Recommendations:   - Have protein snacks for works  - Take walk break 2X/day  - Eliminate coffee and soda completely  - Decrease portions of protein    Handouts: none    [employer]

## 2019-06-28 NOTE — PATIENT INSTRUCTIONS
Goals in preparing for bariatric surgery  You should be giving up all beverages that have carbonation, sugar, and caffeine (Refer to the approved liquids list provided at initial visit).  You should be drinking 64 ounces of low calorie (5 calories or less per serving) fluids per day. Suggestions include:  o Water (you may add fresh lemon or lime)  o Crystal Light  o Edward Liquid Water Enhancer  o Propel Zero  o Powerade Zero/Gatorade Zero  o Isopure  o [id number]  o SOBE Lifewater Zero  o Vitamin Water Zero  o Sugar Free Gamal-Aid  You should be eating 4-6 times per day.  Three small meals plus 1-2 snacks per day is your goal. This balances your calories and nutrients evenly throughout the day and helps to boost your metabolism. Refer to the snack list provided at your initial visit. Aim for a protein at every snack, plus a fruit, vegetable or starch. You should be eating protein at every meal and snack.  Protein is typically found in animal sources, i.e. chicken, lean beef, lean pork, fish and seafood, eggs. It is also found in low-fat dairy sources such as skim or 1% milk, low-fat yogurt, low-fat cheese, and low-fat cottage cheese. Plant based sources of protein include peanut butter, beans, and soy. You should be utilizing the 9-inch plate method.  Eating on a smaller plate will help you control portion size, but what you put on your plate counts also. Make ¼ of your plate lean protein, ¼ carbohydrate (fruit, grain or starchy vegetables) and ½ the plate non-starchy vegetables. You should eliminate caffeine.  Caffeine is dehydrating. After surgery, it's very important to stay hydrated. Giving up caffeine before surgery will help you focus on the changes necessary to be successful after surgery. There are many decaffeinated coffee and tea products available in grocery stores. You should be reducing added fat and sugar in your diet.  Frying foods adds too much fat and calories.  Baking, broiling, or grilling meats add flavor without unhealthy fats. Using cooking oil spray and spray butter products are also healthy options that will aid in your weight loss. Foods high in added sugars are often also high in calories and low in nutrients. Eating habits after surgery need to be a long-term change. Eating habits are often so ingrained that it can be difficult to change. It is important to practice new eating habits prior to surgery to mentally prepare yourself for the challenge ahead. Also, remember that overall health, age, and genetics make each person's weight loss progress different. Do not compare your progress (pre- or post-operatively), the amount you eat, or your exercise to other patients. In addition, it is the responsibility of the patient to schedule and follow up on labs and tests completed during the pre-surgical period. Results will be reviewed at each visit. **IT IS IMPORTANT TO KNOW THAT YOU MUST COMPLETE ALL REQUIRED DIETARY CHANGES, TESTS, CLEARANCES AND ACTIVITIES BEFORE A SURGERY DATE CAN BE GIVEN. IF YOU HAVE NOT MET ANY OF THESE REQUIREMENTS THEN YOU WILL NOT BE GIVEN A SURGERY DATE UNTIL THEY HAVE BEEN MET TO OUR SATISFACTION. THIS IS NOT ONLY DONE TO HELP YOU BE SUCCESSFUL AFTER SURGERY, BUT TO BE SAFE AS WELL.**    Patient received dietary handouts and education.     Plan/Recommendations:   - Have protein snacks for works  - Take walk break 2X/day  - Eliminate coffee and soda completely  - Decrease portions of protein

## 2019-06-28 NOTE — PATIENT INSTRUCTIONS
1.  Increase coreg to 25 mg twice a day  2. Try not to use naprosyn as this may make you hold fluids  3. Handicap parking letter   4. Check blood in 2 months  5.   RTO in 4 months

## 2019-06-28 NOTE — PROGRESS NOTES
Grandmother        Home Medications:  Prior to Admission medications    Medication Sig Start Date End Date Taking?  Authorizing Provider   carvedilol (COREG) 12.5 MG tablet Take 2 tablets by mouth 2 times daily 6/28/19  Yes ADDI Ramos   vitamin D (ERGOCALCIFEROL) 94742 units CAPS capsule Take 1 capsule by mouth once a week 5/31/19  Yes ADDI Perry - CNP   irbesartan (AVAPRO) 150 MG tablet TAKE 1/2 TABLET BY MOUTH EVERY NIGHT 5/28/19  Yes ADDI Cristina - CNS   hydrOXYzine (ATARAX) 25 MG tablet Take 25 mg by mouth 4/25/19  Yes Historical Provider, MD   famotidine (PEPCID) 20 MG tablet TK 1 T PO BID PRF HEARTBURN 4/25/19  Yes Historical Provider, MD   naproxen (NAPROSYN) 500 MG tablet Take 500 mg by mouth 2 times daily (with meals)   Yes Historical Provider, MD   furosemide (LASIX) 40 MG tablet TAKE 2 TABLETS BY MOUTH TWICE DAILY 3/4/19  Yes ADDI Durham - CNS   ASPIRIN LOW DOSE 81 MG chewable tablet CHEW AND SWALLOW ONE TABLET DAILY 3/4/19  Yes ADDI Cristina - CNS   spironolactone (ALDACTONE) 25 MG tablet Take 1 tablet by mouth daily 2/22/19  Yes ADDI Durham - CNS   atorvastatin (LIPITOR) 20 MG tablet TAKE 1 TABLET BY MOUTH EVERY NIGHT 11/5/18  Yes ADDI Durham   albuterol sulfate  (90 Base) MCG/ACT inhaler Inhale 2 puffs into the lungs 12/21/17  Yes Historical Provider, MD   omeprazole (PRILOSEC) 40 MG delayed release capsule Take 40 mg by mouth  6/9/17  Yes Historical Provider, MD   atorvastatin (LIPITOR) 20 MG tablet TAKE 1 TABLET BY MOUTH EVERY NIGHT 5/28/19   ADDI Cristina - CNS   Alpha-D-Galactosidase (BEANO MELTAWAYS) TABS Take 1 tablet by mouth    Historical Provider, MD   mometasone-formoterol (DULERA) 100-5 MCG/ACT inhaler Inhale 2 puffs into the lungs 2 times daily 2/1/18   Geovany Christianson MD   metFORMIN (GLUCOPHAGE) 500 MG tablet Take 1 tablet by mouth 2 times daily (with meals) 2/1/18   Malathy Elana Anguiano MD   ketoconazole (NIZORAL) 2 % cream Apply  topically daily. Apply topically daily. Historical Provider, MD        Allergies:  Levofloxacin     Review of Systems:   · Constitutional: there has been no unanticipated weight loss. There's been no change in energy level, sleep pattern, or activity level. palpitations  · Eyes: No visual changes or diplopia. No scleral icterus. · ENT: No Headaches, hearing loss or vertigo. No mouth sores or sore throat. · Cardiovascular: Reviewed in HPI  · Respiratory: No cough or wheezing, no sputum production. No hematemesis. · Gastrointestinal: No abdominal pain, appetite loss, blood in stools. No change in bowel or bladder habits. · Genitourinary: No dysuria, trouble voiding, or hematuria. · Musculoskeletal:  No gait disturbance, weakness or joint complaints. · Integumentary: No rash or pruritis. · Neurological: No headache, diplopia, change in muscle strength, numbness or tingling. No change in gait, balance, coordination, mood, affect, memory, mentation, behavior. · Psychiatric: No anxiety, no depression. · Endocrine: No malaise, fatigue or temperature intolerance. No excessive thirst, fluid intake, or urination. No tremor. · Hematologic/Lymphatic: No abnormal bruising or bleeding, blood clots or swollen lymph nodes. · Allergic/Immunologic: No nasal congestion or hives.     Physical Examination:    Vitals:    06/28/19 1324 06/28/19 1340   BP: 110/76    Pulse: 99    SpO2: (!) 89% 91%   Weight: (!) 431 lb (195.5 kg)    Height: 5' 4\" (1.626 m)         Constitutional and General Appearance: Warm and dry, no apparent distress, normal coloration  HEENT:  Normocephalic, atraumatic  Respiratory:  · Normal excursion and expansion without use of accessory muscles  · Resp Auscultation: Normal breath sounds without dullness  Cardiovascular:  · The apical impulses not displaced  · Heart tones are crisp and normal  · JVP hard to assess due to neck size  · Regular rate and rhythm, skipped beats  · Peripheral pulses are symmetrical and full  · There is no clubbing, cyanosis of the extremities. · No ankle edema  · Pedal Pulses: 2+ and equal   Abdomen: morbidly obese  · No masses or tenderness  · Liver/Spleen: No Abnormalities Noted  Neurological/Psychiatric:  · Alert and oriented in all spheres  · Moves all extremities well  · Exhibits normal gait balance and coordination  · No abnormalities of mood, affect, memory, mentation, or behavior are noted    Lab Data:    CBC:   Lab Results   Component Value Date    WBC 5.2 05/24/2019    WBC 5.9 02/01/2018    WBC 7.9 01/31/2018    RBC 4.49 05/24/2019    RBC 5.29 02/01/2018    RBC 5.03 01/31/2018    HGB 11.7 05/24/2019    HGB 12.6 02/01/2018    HGB 11.9 01/31/2018    HCT 37.7 05/24/2019    HCT 41.5 02/01/2018    HCT 39.0 01/31/2018    MCV 84.1 05/24/2019    MCV 78.5 02/01/2018    MCV 77.6 01/31/2018    RDW 16.2 05/24/2019    RDW 18.3 02/01/2018    RDW 18.4 01/31/2018     05/24/2019     02/01/2018     01/31/2018     BMP:  Lab Results   Component Value Date     05/24/2019     01/18/2019     08/24/2018    K 4.3 05/24/2019    K 4.5 01/18/2019    K 4.1 08/24/2018     05/24/2019    CL 99 01/18/2019    CL 97 08/24/2018    CO2 28 05/24/2019    CO2 30 01/18/2019    CO2 33 08/24/2018    PHOS 5.1 02/01/2018    PHOS 3.9 01/31/2018    PHOS 3.7 01/30/2018    BUN 12 05/24/2019    BUN 15 01/18/2019    BUN 13 08/24/2018    CREATININE 0.7 05/24/2019    CREATININE 0.7 01/18/2019    CREATININE 0.8 08/24/2018     BNP:   Lab Results   Component Value Date    PROBNP 85 05/24/2019    PROBNP 120 01/18/2019    PROBNP 155 08/24/2018     Cardiac Imaging:    Echo 1/26/2018:  Summary  Technically limited study due to body habitus. Normal left ventricle size and wall thickness.   Left ventricular function is difficult to estimate due to poor endocardial visualization but appears to be reduced and is estimated at 45-50%. Mild mitral regurgitation is present. There is mild-moderate tricuspid regurgitation with RVSP estimated at 48 mmHg. Echo 4/29/2013:  Study Conclusions  - Left ventricle: The cavity size was normal. Wall thickness was    normal. Systolic function was normal. The estimated ejection    fraction was in the range of 55% to 60%. Probable mild hypokinesis    of the basalinferior myocardium. Features are consistent with a    pseudonormal left ventricular filling pattern, with concomitant    abnormal relaxation and increased filling pressure (grade 2    diastolic dysfunction). - Mitral valve: Mild regurgitation.  - Right ventricle: Systolic function was normal.     Stress echo 6/24/2013:  Study Conclusions  - Study data: The previous study was not available, so comparison    was made to the report of April 29, 2013, and there has been no    significant change. - Baseline ECG: Normal sinus rhythm. - Stress: The patient experienced no chest pain during stress. - Stress ECG conclusions: The stress ECG was negative for ischemia. - Baseline: LV global systolic function was normal. The estimated LV    ejection fraction was 50% to 55%. Normal wall motion; no LV    regional wall motion abnormalities. - Peak stress: LV global systolic function was vigorous. The    estimated LV ejection fraction was 70% to 75%. Normal wall motion;    no LV regional wall motion abnormalities. Impressions:  No ischemic wall motion abnormalitiesafter  pharmacologic stress. Assessment:    1. Chronic systolic heart failure (HCC) on ace, BB and aldosterone antagonist, compensated   2. Essential hypertension controlled   3. Morbid obesity ([address]    4. Obstructive sleep apnea syndrome following with Dr Eddie Tuttle; on bipap       Plan:   1. Increase coreg to 25 mg twice a day  2. Try not to use naprosyn as this may make you hold fluids  3. Handicap parking letter   4. Check blood in 2 months  5.   RTO in 4 months    EKG done today which show NSR with PVCs. Patient has declined stress test in past and continues to decline. Discuss rechecking echo at next office visit    I appreciate the opportunity of cooperating in the care of this individual.    JEFFREY Branch, 6/28/2019, 3:44 PM    QUALITY MEASURES  1. Tobacco Cessation Counseling: NA  2. Retake of BP if >140/90:   NA  3. Documentation to PCP/referring for new patient:  Sent to PCP at close of office visit  4. CAD patient on anti-platelet: NA  5. CAD patient on STATIN therapy:  NA  6.  Patient with CHF and aFib on anticoagulation:  NA

## 2019-06-28 NOTE — TELEPHONE ENCOUNTER
----- Message from ADDI Matthews - CNS sent at 6/28/2019  3:46 PM EDT -----  Erica Ball note to PCP  Thanks  rg

## 2019-07-02 ENCOUNTER — TELEPHONE (OUTPATIENT)
Dept: CARDIOLOGY CLINIC | Age: 49
End: 2019-07-02

## 2019-07-02 DIAGNOSIS — R00.2 PALPITATIONS: Primary | ICD-10-CM

## 2019-07-02 NOTE — TELEPHONE ENCOUNTER
Please schedule patient to have a 48 hour holter placed. Patient contacted and has increased her coreg as instructed at [address] [place] [address]. She will have monitor placed once contacted/scheduled.

## 2019-07-02 NOTE — TELEPHONE ENCOUNTER
Pt calling. Pt is still having palpitations that are getting worse. Seems to be all the time now. Pt saw [id number]  On 6/28/19, pt has SOB and chest pressure especially when she walks.  Please call pt to advise

## 2019-07-05 ENCOUNTER — HOSPITAL ENCOUNTER (OUTPATIENT)
Dept: ULTRASOUND IMAGING | Age: 49
Discharge: HOME | End: 2019-07-05
Payer: COMMERCIAL

## 2019-07-05 PROCEDURE — 76705 ECHO EXAM OF ABDOMEN: CPT

## 2019-07-05 NOTE — TELEPHONE ENCOUNTER
LM with instructions to call us if needed per [facility] comments below.

## 2019-07-05 NOTE — TELEPHONE ENCOUNTER
Pt states her heart stopped skipping beats yesterday and she does not want to schedule for the 48 hr monitor.

## 2019-07-05 NOTE — TELEPHONE ENCOUNTER
Covering for Lea. Noted. Ask her to call office if palpitations worsen again and we will arrange for Holter.

## 2019-07-27 ASSESSMENT — ENCOUNTER SYMPTOMS
RESPIRATORY NEGATIVE: 1
EYES NEGATIVE: 1
GASTROINTESTINAL NEGATIVE: 1
ALLERGIC/IMMUNOLOGIC NEGATIVE: 1

## 2019-07-27 NOTE — PATIENT INSTRUCTIONS
Goals in preparing for bariatric surgery  You should be giving up all beverages that have carbonation, sugar, and caffeine (Refer to the approved liquids list provided at initial visit).  You should be drinking 64 ounces of low calorie (5 calories or less per serving) fluids per day. Suggestions include:  o Water (you may add fresh lemon or lime)  o Crystal Light  o Edward Liquid Water Enhancer  o Propel Zero  o Powerade Zero/Gatorade Zero  o Isopure  o [id number]  o SOBE Lifewater Zero  o Vitamin Water Zero  o Sugar Free Gamal-Aid  You should be eating 4-6 times per day.  Three small meals plus 1-2 snacks per day is your goal. This balances your calories and nutrients evenly throughout the day and helps to boost your metabolism. Refer to the snack list provided at your initial visit. Aim for a protein at every snack, plus a fruit, vegetable or starch. You should be eating protein at every meal and snack.  Protein is typically found in animal sources, i.e. chicken, lean beef, lean pork, fish and seafood, eggs. It is also found in low-fat dairy sources such as skim or 1% milk, low-fat yogurt, low-fat cheese, and low-fat cottage cheese. Plant based sources of protein include peanut butter, beans, and soy. You should be utilizing the 9-inch plate method.  Eating on a smaller plate will help you control portion size, but what you put on your plate counts also. Make ¼ of your plate lean protein, ¼ carbohydrate (fruit, grain or starchy vegetables) and ½ the plate non-starchy vegetables. You should eliminate caffeine.  Caffeine is dehydrating. After surgery, it's very important to stay hydrated. Giving up caffeine before surgery will help you focus on the changes necessary to be successful after surgery. There are many decaffeinated coffee and tea products available in grocery stores. You should be reducing added fat and sugar in your diet.  Frying foods adds too much fat and calories.  Baking, broiling, or grilling meats add flavor without unhealthy fats. Using cooking oil spray and spray butter products are also healthy options that will aid in your weight loss. Foods high in added sugars are often also high in calories and low in nutrients. Eating habits after surgery need to be a long-term change. Eating habits are often so ingrained that it can be difficult to change. It is important to practice new eating habits prior to surgery to mentally prepare yourself for the challenge ahead. Also, remember that overall health, age, and genetics make each person's weight loss progress different. Do not compare your progress (pre- or post-operatively), the amount you eat, or your exercise to other patients. In addition, it is the responsibility of the patient to schedule and follow up on labs and tests completed during the pre-surgical period. Results will be reviewed at each visit. **IT IS IMPORTANT TO KNOW THAT YOU MUST COMPLETE ALL REQUIRED DIETARY CHANGES, TESTS, CLEARANCES AND ACTIVITIES BEFORE A SURGERY DATE CAN BE GIVEN. IF YOU HAVE NOT MET ANY OF THESE REQUIREMENTS THEN YOU WILL NOT BE GIVEN A SURGERY DATE UNTIL THEY HAVE BEEN MET TO OUR SATISFACTION. THIS IS NOT ONLY DONE TO HELP YOU BE SUCCESSFUL AFTER SURGERY, BUT TO BE SAFE AS WELL.**    Patient received dietary handouts and education. Soy or whey protein isolate only for protein shakes and tam. Goal of 60-80 grams of protein per day.

## 2019-07-27 NOTE — PROGRESS NOTES
[facility]) Physicians   [employer]    Subjective:      Patient ID: Shante Johnson is a 46 y.o. female    HPI    The patient is here for their bariatric surgery presurgical visit. She has made several attempts at weight loss in the past without success and now wishes to pursue bariatric surgery. She is working to change her dietary behaviors and lose weight to improve comorbid conditions such as hypertension, diabetes mellitus and obstructive sleep apnea. Shante Johnson is a very pleasant 46 y.o. female with Body mass index is 71.85 kg/m². Past Medical History:   Diagnosis Date    Acute on chronic combined systolic and diastolic CHF (congestive heart failure) ([facility] 1/26/2018    Asthma     Cardiomyopathy ([facility]     Diabetes mellitus ([facility]     Heart murmur     Hypertension     Obstructive sleep apnea (adult) (pediatric) 1/26/2018    Sleep apnea     Urinary incontinence      History reviewed. No pertinent surgical history. Family History   Problem Relation Age of Onset    High Blood Pressure Mother     Other Mother         cardiac murmur    Arthritis Mother     Hypertension Mother     High Blood Pressure Father     Hypertension Father     Stroke Father     Arthritis Father     High Blood Pressure Sister     Diabetes Sister         prediabetic    Hypertension Sister     Hypertension Maternal Uncle     Cancer Maternal Uncle     Cancer Maternal Grandmother      Social History     Tobacco Use    Smoking status: Never Smoker    Smokeless tobacco: Never Used   Substance Use Topics    Alcohol use: No     I counseled the patient on the importance of not smoking and risks of ETOH. Allergies   Allergen Reactions    Levofloxacin Other (See Comments)     headache     Vitals:    07/31/19 1301   BP: 121/60   Pulse: 96   Weight: (!) 418 lb 9.6 oz (189.9 kg)   Height: 5' 4\" (1.626 m)     Body mass index is 71.85 kg/m².     Current Outpatient Medications:     spironolactone normal.   Neck: Normal range of motion. Neck supple. Cardiovascular: Normal rate. Pulmonary/Chest: Effort normal.   Abdominal: Soft. Musculoskeletal: Normal range of motion. Neurological: She is alert and oriented to person, place, and time. Skin: Skin is warm and dry. Psychiatric: She has a normal mood and affect. Her behavior is normal. Judgment and thought content normal.   Vitals reviewed. Assessment and Plan:   Patient is here for their 3rd presurgery visit for sleeve, down 11 lbs. The patient's current Body mass index is 71.85 kg/m². (7/31/19). She is making better dietary and behavior modifications. Needs to continue to work on eliminating caffeine, increasing fluids and making sure she is getting protein with all her meals/snacks. She did meet with the registered dietitian for continued follow up. I agree with recommendations and plan. She is exercising with parking further away and doing some dancing at home for about 60 minutes. Encouraged continued physical activity. Spoke with patient about her GBUS. Discussed that their gallbladder was normal, but there was some fatty liver disease noted by the radiologist. Explained that with dietary changes and weight loss she should see improvement in that regard. Based on the size and appearance of the liver during their bariatric surgery Dr. Jose Alejandro Phoenix may elect to do a liver biopsy to evaluate the liver. Discussed preop work up which still needs psych evaluation, support group, PCP letter, sleep clearance, cardiac clearance, EGD and BMI <65. We will see her back in 1 month for continued follow up. A total of 15 minutes was spent face-to-face with the patient and over half of that time was spent counseling the patient on proper dietary behaviors, exercise and preoperative work-up.

## 2019-07-31 ENCOUNTER — OFFICE VISIT (OUTPATIENT)
Dept: BARIATRICS/WEIGHT MGMT | Age: 49
End: 2019-07-31
Payer: COMMERCIAL

## 2019-07-31 VITALS
SYSTOLIC BLOOD PRESSURE: 121 MMHG | HEART RATE: 96 BPM | BODY MASS INDEX: 50.02 KG/M2 | DIASTOLIC BLOOD PRESSURE: 60 MMHG | WEIGHT: 293 LBS | HEIGHT: 64 IN

## 2019-07-31 DIAGNOSIS — G47.33 OBSTRUCTIVE SLEEP APNEA (ADULT) (PEDIATRIC): ICD-10-CM

## 2019-07-31 DIAGNOSIS — E66.01 MORBID OBESITY WITH BMI OF 70 AND OVER, ADULT (HCC): Chronic | ICD-10-CM

## 2019-07-31 DIAGNOSIS — E11.69 DIABETES MELLITUS TYPE 2 IN OBESE (HCC): ICD-10-CM

## 2019-07-31 DIAGNOSIS — E66.9 DIABETES MELLITUS TYPE 2 IN OBESE (HCC): ICD-10-CM

## 2019-07-31 DIAGNOSIS — I10 ESSENTIAL HYPERTENSION: Primary | Chronic | ICD-10-CM

## 2019-07-31 PROCEDURE — 99213 OFFICE O/P EST LOW 20 MIN: CPT | Performed by: NURSE PRACTITIONER

## 2019-08-16 ENCOUNTER — OFFICE VISIT (OUTPATIENT)
Dept: PULMONOLOGY | Age: 49
End: 2019-08-16
Payer: COMMERCIAL

## 2019-08-16 VITALS
HEIGHT: 64 IN | BODY MASS INDEX: 50.02 KG/M2 | HEART RATE: 67 BPM | DIASTOLIC BLOOD PRESSURE: 74 MMHG | OXYGEN SATURATION: 98 % | SYSTOLIC BLOOD PRESSURE: 128 MMHG | WEIGHT: 293 LBS

## 2019-08-16 DIAGNOSIS — I50.42 CHRONIC COMBINED SYSTOLIC AND DIASTOLIC CHF (CONGESTIVE HEART FAILURE) (HCC): Chronic | ICD-10-CM

## 2019-08-16 DIAGNOSIS — I10 ESSENTIAL HYPERTENSION: Chronic | ICD-10-CM

## 2019-08-16 DIAGNOSIS — E66.01 MORBID OBESITY WITH BMI OF 70 AND OVER, ADULT (HCC): Chronic | ICD-10-CM

## 2019-08-16 DIAGNOSIS — E11.69 DIABETES MELLITUS TYPE 2 IN OBESE (HCC): ICD-10-CM

## 2019-08-16 DIAGNOSIS — G47.33 OBSTRUCTIVE SLEEP APNEA (ADULT) (PEDIATRIC): Primary | ICD-10-CM

## 2019-08-16 DIAGNOSIS — E66.9 DIABETES MELLITUS TYPE 2 IN OBESE (HCC): ICD-10-CM

## 2019-08-16 PROCEDURE — 99214 OFFICE O/P EST MOD 30 MIN: CPT | Performed by: NURSE PRACTITIONER

## 2019-08-16 ASSESSMENT — ENCOUNTER SYMPTOMS
EYE REDNESS: 0
APNEA: 0
ABDOMINAL DISTENTION: 0
COUGH: 0
EYE PAIN: 0
SINUS PRESSURE: 0
RHINORRHEA: 0
SHORTNESS OF BREATH: 0
ABDOMINAL PAIN: 0

## 2019-08-16 ASSESSMENT — SLEEP AND FATIGUE QUESTIONNAIRES
HOW LIKELY ARE YOU TO NOD OFF OR FALL ASLEEP WHILE SITTING QUIETLY AFTER LUNCH WITHOUT ALCOHOL: 0
ESS TOTAL SCORE: 6
HOW LIKELY ARE YOU TO NOD OFF OR FALL ASLEEP WHILE LYING DOWN TO REST IN THE AFTERNOON WHEN CIRCUMSTANCES PERMIT: 3
HOW LIKELY ARE YOU TO NOD OFF OR FALL ASLEEP WHILE SITTING INACTIVE IN A PUBLIC PLACE: 0
HOW LIKELY ARE YOU TO NOD OFF OR FALL ASLEEP WHILE WATCHING TV: 1
HOW LIKELY ARE YOU TO NOD OFF OR FALL ASLEEP IN A CAR, WHILE STOPPED FOR A FEW MINUTES IN TRAFFIC: 0
HOW LIKELY ARE YOU TO NOD OFF OR FALL ASLEEP WHILE SITTING AND READING: 1
HOW LIKELY ARE YOU TO NOD OFF OR FALL ASLEEP WHEN YOU ARE A PASSENGER IN A CAR FOR AN HOUR WITHOUT A BREAK: 1
HOW LIKELY ARE YOU TO NOD OFF OR FALL ASLEEP WHILE SITTING AND TALKING TO SOMEONE: 0

## 2019-08-16 NOTE — ASSESSMENT & PLAN NOTE
Reviewed compliance download with pt. Supplies and parts as needed for her machine. These are medically necessary. Continue medications per her PCP and other physicians. Limit caffeine use after 3pm.  Encouraged her to work on weight loss through diet and exercise. Diagnoses of Morbid obesity with BMI of 70 and over, adult [facility]), Essential hypertension, Diabetes mellitus type 2 in obese ([facility] [address] and Chronic combined systolic and diastolic CHF (congestive heart failure) ([facility] [address] [facility] were pertinent to this visit. The chronic medical conditions listed are directly related to the primary diagnosis listed above. The management of the primary diagnosis affects the secondary diagnosis and vice versa.

## 2019-08-16 NOTE — PROGRESS NOTES
Understands how to change humidification and/or tubing temperature for comfort while at home  [x] Yes  [] No     Difficulties falling asleep  [] Yes  [x] No   Difficulties staying asleep  [] Yes  [x] No   Approximate time to bed  11pm-12am   Approximate wake time  6:10-8am   Taking Naps  occasional   If taking naps usual length  5-10 min    If taking naps using the machine  [] Yes  [x] No  [] NA   Drowsy when driving  [] Yes  [x] No     Does patient carry a DOT/CDL  [] Yes  [x] No     Does patient carry FAA/[employer] License   [] Yes  [x] No      Any concerns noted with the machine at this time  [] Yes  [x] No        Diagnosis Orders   1. Obstructive sleep apnea (adult) (pediatric)     2. Morbid obesity with BMI of 70 and over, adult ([facility]     3. Essential hypertension     4. Diabetes mellitus type 2 in obese ([facility]     5. Chronic combined systolic and diastolic CHF (congestive heart failure) (HCC)         The chronic medical conditions listed are directly related to the primary diagnosis listed above. The management of the primary diagnosis affects the secondary diagnosis and vice versa. Review of Systems   Constitutional: Negative for appetite change, chills, fatigue and fever. HENT: Negative for congestion, nosebleeds, rhinorrhea and sinus pressure. Eyes: Negative for pain and redness. Respiratory: Negative for apnea, cough and shortness of breath. Cardiovascular: Negative for chest pain and palpitations. Gastrointestinal: Negative for abdominal distention and abdominal pain. Neurological: Negative for dizziness and headaches. Psychiatric/Behavioral: Negative for sleep disturbance.        Social History     Socioeconomic History    Marital status: Single     Spouse name: Not on file    Number of children: 3    Years of education: Not on file    Highest education level: Not on file   Occupational History    Not on file   Social Needs    Financial resource strain: Not on file   meQuilibrium insecurity:     Worry: Not on file     Inability: Not on file    Transportation needs:     Medical: Not on file     Non-medical: Not on file   Tobacco Use    Smoking status: Never Smoker    Smokeless tobacco: Never Used   Substance and Sexual Activity    Alcohol use: No    Drug use: No    Sexual activity: Never   Lifestyle    Physical activity:     Days per week: Not on file     Minutes per session: Not on file    Stress: Not on file   Relationships    Social connections:     Talks on phone: Not on file     Gets together: Not on file     Attends Alevism service: Not on file     Active member of club or organization: Not on file     Attends meetings of clubs or organizations: Not on file     Relationship status: Not on file    Intimate partner violence:     Fear of current or ex partner: Not on file     Emotionally abused: Not on file     Physically abused: Not on file     Forced sexual activity: Not on file   Other Topics Concern    Not on file   Social History Narrative    Not on file       Prior to Admission medications    Medication Sig Start Date End Date Taking?  Authorizing Provider   spironolactone (ALDACTONE) 25 MG tablet TAKE 1 TABLET BY MOUTH DAILY 7/12/19  Yes Lea Ramey APRN - CNS   carvedilol (COREG) 12.5 MG tablet Take 2 tablets by mouth 2 times daily 6/28/19  Yes Reynaldo Sero, APRN - CNS   vitamin D (ERGOCALCIFEROL) 83412 units CAPS capsule Take 1 capsule by mouth once a week 5/31/19  Yes ADDI Calhoun - CNP   irbesartan (AVAPRO) 150 MG tablet TAKE 1/2 TABLET BY MOUTH EVERY NIGHT 5/28/19  Yes Lea Ramey APRN - CNS   atorvastatin (LIPITOR) 20 MG tablet TAKE 1 TABLET BY MOUTH EVERY NIGHT 5/28/19  Yes Lea Ramey APRN - CNS   hydrOXYzine (ATARAX) 25 MG tablet Take 25 mg by mouth 4/25/19  Yes Historical Provider, MD   famotidine (PEPCID) 20 MG tablet TK 1 T PO BID PRF HEARTBURN 4/25/19  Yes Historical Provider, MD   naproxen (NAPROSYN) 500 MG tablet Take 500

## 2019-08-25 ASSESSMENT — ENCOUNTER SYMPTOMS
RESPIRATORY NEGATIVE: 1
ALLERGIC/IMMUNOLOGIC NEGATIVE: 1
GASTROINTESTINAL NEGATIVE: 1
EYES NEGATIVE: 1

## 2019-08-26 NOTE — PROGRESS NOTES
25 MG tablet, TAKE 1 TABLET BY MOUTH DAILY, Disp: 30 tablet, Rfl: 1    carvedilol (COREG) 12.5 MG tablet, Take 2 tablets by mouth 2 times daily, Disp: 60 tablet, Rfl: 0    vitamin D (ERGOCALCIFEROL) 89777 units CAPS capsule, Take 1 capsule by mouth once a week, Disp: 4 capsule, Rfl: 2    irbesartan (AVAPRO) 150 MG tablet, TAKE 1/2 TABLET BY MOUTH EVERY NIGHT, Disp: 45 tablet, Rfl: 0    atorvastatin (LIPITOR) 20 MG tablet, TAKE 1 TABLET BY MOUTH EVERY NIGHT, Disp: 30 tablet, Rfl: 0    hydrOXYzine (ATARAX) 25 MG tablet, Take 25 mg by mouth, Disp: , Rfl:     famotidine (PEPCID) 20 MG tablet, TK 1 T PO BID PRF HEARTBURN, Disp: , Rfl: 0    naproxen (NAPROSYN) 500 MG tablet, Take 500 mg by mouth 2 times daily (with meals), Disp: , Rfl:     furosemide (LASIX) 40 MG tablet, TAKE 2 TABLETS BY MOUTH TWICE DAILY, Disp: 120 tablet, Rfl: 2    ASPIRIN LOW DOSE 81 MG chewable tablet, CHEW AND SWALLOW ONE TABLET DAILY, Disp: 30 tablet, Rfl: 2    atorvastatin (LIPITOR) 20 MG tablet, TAKE 1 TABLET BY MOUTH EVERY NIGHT, Disp: 30 tablet, Rfl: 0    Alpha-D-Galactosidase (BEANO MELTAWAYS) TABS, Take 1 tablet by mouth, Disp: , Rfl:     mometasone-formoterol (DULERA) 100-5 MCG/ACT inhaler, Inhale 2 puffs into the lungs 2 times daily, Disp: 1 Inhaler, Rfl: 0    metFORMIN (GLUCOPHAGE) 500 MG tablet, Take 1 tablet by mouth 2 times daily (with meals), Disp: 60 tablet, Rfl: 3    albuterol sulfate  (90 Base) MCG/ACT inhaler, Inhale 2 puffs into the lungs, Disp: , Rfl:     omeprazole (PRILOSEC) 40 MG delayed release capsule, Take 40 mg by mouth , Disp: , Rfl:     ketoconazole (NIZORAL) 2 % cream, Apply  topically daily. Apply topically daily. , Disp: , Rfl:     Lab Results   Component Value Date    WBC 5.2 05/24/2019    RBC 4.49 05/24/2019    HGB 11.7 05/24/2019    HCT 37.7 05/24/2019    MCV 84.1 05/24/2019    MCH 26.1 05/24/2019    MCHC 31.1 05/24/2019    MPV 8.7 05/24/2019    NEUTOPHILPCT 57.3 05/24/2019    LYMPHOPCT 33.0

## 2019-08-28 ENCOUNTER — OFFICE VISIT (OUTPATIENT)
Dept: BARIATRICS/WEIGHT MGMT | Age: 49
End: 2019-08-28
Payer: COMMERCIAL

## 2019-08-28 VITALS
DIASTOLIC BLOOD PRESSURE: 60 MMHG | HEIGHT: 64 IN | BODY MASS INDEX: 50.02 KG/M2 | SYSTOLIC BLOOD PRESSURE: 100 MMHG | WEIGHT: 293 LBS | RESPIRATION RATE: 12 BRPM | HEART RATE: 90 BPM

## 2019-08-28 DIAGNOSIS — E11.69 DIABETES MELLITUS TYPE 2 IN OBESE (HCC): ICD-10-CM

## 2019-08-28 DIAGNOSIS — I10 ESSENTIAL HYPERTENSION: Primary | Chronic | ICD-10-CM

## 2019-08-28 DIAGNOSIS — E66.01 MORBID OBESITY WITH BMI OF 70 AND OVER, ADULT (HCC): Chronic | ICD-10-CM

## 2019-08-28 DIAGNOSIS — E66.9 DIABETES MELLITUS TYPE 2 IN OBESE (HCC): ICD-10-CM

## 2019-08-28 DIAGNOSIS — G47.33 OBSTRUCTIVE SLEEP APNEA (ADULT) (PEDIATRIC): ICD-10-CM

## 2019-08-28 PROCEDURE — 99213 OFFICE O/P EST LOW 20 MIN: CPT | Performed by: NURSE PRACTITIONER

## 2019-09-05 ENCOUNTER — HOSPITAL ENCOUNTER (OUTPATIENT)
Age: 49
Discharge: HOME | End: 2019-09-05
Payer: COMMERCIAL

## 2019-09-05 DIAGNOSIS — I50.22 CHRONIC SYSTOLIC HEART FAILURE (HCC): ICD-10-CM

## 2019-09-05 LAB
ANION GAP SERPL CALCULATED.3IONS-SCNC: 12 MMOL/L (ref 3–16)
BUN BLDV-MCNC: 17 MG/DL (ref 7–20)
CALCIUM SERPL-MCNC: 9.4 MG/DL (ref 8.3–10.6)
CHLORIDE BLD-SCNC: 98 MMOL/L (ref 99–110)
CO2: 28 MMOL/L (ref 21–32)
CREAT SERPL-MCNC: 1 MG/DL (ref 0.6–1.1)
GFR AFRICAN AMERICAN: >60
GFR NON-AFRICAN AMERICAN: 58
GLUCOSE BLD-MCNC: 99 MG/DL (ref 70–99)
POTASSIUM SERPL-SCNC: 4.1 MMOL/L (ref 3.5–5.1)
PRO-BNP: 85 PG/ML (ref 0–124)
SODIUM BLD-SCNC: 138 MMOL/L (ref 136–145)

## 2019-09-05 PROCEDURE — 80048 BASIC METABOLIC PNL TOTAL CA: CPT

## 2019-09-05 PROCEDURE — 83880 ASSAY OF NATRIURETIC PEPTIDE: CPT

## 2019-09-05 PROCEDURE — 36415 COLL VENOUS BLD VENIPUNCTURE: CPT

## 2019-09-06 ENCOUNTER — TELEPHONE (OUTPATIENT)
Dept: CARDIOLOGY CLINIC | Age: 49
End: 2019-09-06

## 2019-09-06 NOTE — TELEPHONE ENCOUNTER
----- Message from ADDI Mattson CNS sent at 9/6/2019  8:38 AM EDT -----  Labs look good  Continue current medications  thanks

## 2019-09-11 RX ORDER — SPIRONOLACTONE 25 MG/1
25 TABLET ORAL DAILY
Qty: 30 TABLET | Refills: 1 | Status: SHIPPED | OUTPATIENT
Start: 2019-09-11 | End: 2019-12-20

## 2019-09-11 RX ORDER — IRBESARTAN 150 MG/1
TABLET ORAL
Qty: 30 TABLET | Refills: 1 | Status: SHIPPED | OUTPATIENT
Start: 2019-09-11 | End: 2019-10-28 | Stop reason: SDUPTHER

## 2019-09-11 RX ORDER — ASPIRIN 81 MG
TABLET,CHEWABLE ORAL
Qty: 90 TABLET | Refills: 1 | Status: SHIPPED | OUTPATIENT
Start: 2019-09-11 | End: 2020-05-04

## 2019-09-11 NOTE — TELEPHONE ENCOUNTER
LF spironolactone 7/17/19 #30  Irbesartan 5/28/19 #45  ASA 3/4/19 #30    LOV 6/28/19   Labs 9/5/19  FU 11/1/19

## 2019-09-27 ASSESSMENT — ENCOUNTER SYMPTOMS
RESPIRATORY NEGATIVE: 1
ALLERGIC/IMMUNOLOGIC NEGATIVE: 1
EYES NEGATIVE: 1
GASTROINTESTINAL NEGATIVE: 1

## 2019-09-30 ENCOUNTER — OFFICE VISIT (OUTPATIENT)
Dept: BARIATRICS/WEIGHT MGMT | Age: 49
End: 2019-09-30
Payer: COMMERCIAL

## 2019-09-30 VITALS
HEIGHT: 64 IN | RESPIRATION RATE: 12 BRPM | DIASTOLIC BLOOD PRESSURE: 60 MMHG | SYSTOLIC BLOOD PRESSURE: 110 MMHG | BODY MASS INDEX: 50.02 KG/M2 | HEART RATE: 104 BPM | WEIGHT: 293 LBS

## 2019-09-30 DIAGNOSIS — E66.01 MORBID OBESITY WITH BMI OF 70 AND OVER, ADULT (HCC): Chronic | ICD-10-CM

## 2019-09-30 DIAGNOSIS — E66.9 DIABETES MELLITUS TYPE 2 IN OBESE (HCC): ICD-10-CM

## 2019-09-30 DIAGNOSIS — G47.33 OBSTRUCTIVE SLEEP APNEA (ADULT) (PEDIATRIC): ICD-10-CM

## 2019-09-30 DIAGNOSIS — E11.69 DIABETES MELLITUS TYPE 2 IN OBESE (HCC): ICD-10-CM

## 2019-09-30 DIAGNOSIS — I10 ESSENTIAL HYPERTENSION: Primary | Chronic | ICD-10-CM

## 2019-09-30 PROCEDURE — 99213 OFFICE O/P EST LOW 20 MIN: CPT | Performed by: NURSE PRACTITIONER

## 2019-10-04 ENCOUNTER — TELEPHONE (OUTPATIENT)
Dept: CARDIOLOGY CLINIC | Age: 49
End: 2019-10-04

## 2019-10-13 DIAGNOSIS — I50.22 CHRONIC SYSTOLIC HEART FAILURE (HCC): ICD-10-CM

## 2019-10-14 RX ORDER — CARVEDILOL 12.5 MG/1
TABLET ORAL
Qty: 60 TABLET | Refills: 0 | Status: SHIPPED | OUTPATIENT
Start: 2019-10-14 | End: 2019-10-28 | Stop reason: ALTCHOICE

## 2019-10-22 ASSESSMENT — ENCOUNTER SYMPTOMS
RESPIRATORY NEGATIVE: 1
EYES NEGATIVE: 1
ALLERGIC/IMMUNOLOGIC NEGATIVE: 1
GASTROINTESTINAL NEGATIVE: 1

## 2019-10-28 ENCOUNTER — OFFICE VISIT (OUTPATIENT)
Dept: BARIATRICS/WEIGHT MGMT | Age: 49
End: 2019-10-28
Payer: COMMERCIAL

## 2019-10-28 ENCOUNTER — OFFICE VISIT (OUTPATIENT)
Dept: CARDIOLOGY CLINIC | Age: 49
End: 2019-10-28
Payer: COMMERCIAL

## 2019-10-28 VITALS
WEIGHT: 293 LBS | DIASTOLIC BLOOD PRESSURE: 60 MMHG | HEIGHT: 64 IN | SYSTOLIC BLOOD PRESSURE: 110 MMHG | RESPIRATION RATE: 12 BRPM | HEART RATE: 100 BPM | BODY MASS INDEX: 50.02 KG/M2 | OXYGEN SATURATION: 95 %

## 2019-10-28 VITALS
DIASTOLIC BLOOD PRESSURE: 62 MMHG | OXYGEN SATURATION: 92 % | HEIGHT: 64 IN | WEIGHT: 293 LBS | HEART RATE: 81 BPM | BODY MASS INDEX: 50.02 KG/M2 | SYSTOLIC BLOOD PRESSURE: 116 MMHG

## 2019-10-28 DIAGNOSIS — E11.69 DIABETES MELLITUS TYPE 2 IN OBESE (HCC): ICD-10-CM

## 2019-10-28 DIAGNOSIS — I10 ESSENTIAL HYPERTENSION: ICD-10-CM

## 2019-10-28 DIAGNOSIS — G47.33 OBSTRUCTIVE SLEEP APNEA (ADULT) (PEDIATRIC): ICD-10-CM

## 2019-10-28 DIAGNOSIS — E66.9 DIABETES MELLITUS TYPE 2 IN OBESE (HCC): ICD-10-CM

## 2019-10-28 DIAGNOSIS — I10 ESSENTIAL HYPERTENSION: Primary | Chronic | ICD-10-CM

## 2019-10-28 DIAGNOSIS — E66.01 MORBID OBESITY (HCC): ICD-10-CM

## 2019-10-28 DIAGNOSIS — I50.22 CHRONIC SYSTOLIC HEART FAILURE (HCC): Primary | ICD-10-CM

## 2019-10-28 DIAGNOSIS — E66.01 MORBID OBESITY WITH BMI OF 70 AND OVER, ADULT (HCC): Chronic | ICD-10-CM

## 2019-10-28 DIAGNOSIS — E55.9 VITAMIN D DEFICIENCY: ICD-10-CM

## 2019-10-28 DIAGNOSIS — G47.33 OSA (OBSTRUCTIVE SLEEP APNEA): ICD-10-CM

## 2019-10-28 PROCEDURE — 99213 OFFICE O/P EST LOW 20 MIN: CPT | Performed by: NURSE PRACTITIONER

## 2019-10-28 PROCEDURE — 99214 OFFICE O/P EST MOD 30 MIN: CPT | Performed by: CLINICAL NURSE SPECIALIST

## 2019-10-28 RX ORDER — IRBESARTAN 150 MG/1
TABLET ORAL
Qty: 45 TABLET | Refills: 0 | Status: SHIPPED | OUTPATIENT
Start: 2019-10-28 | End: 2020-05-08 | Stop reason: SDUPTHER

## 2019-10-28 RX ORDER — CARVEDILOL 25 MG/1
25 TABLET ORAL 2 TIMES DAILY
Qty: 90 TABLET | Refills: 1 | Status: SHIPPED | OUTPATIENT
Start: 2019-10-28 | End: 2020-03-12

## 2019-10-28 RX ORDER — ATORVASTATIN CALCIUM 20 MG/1
TABLET, FILM COATED ORAL
Qty: 30 TABLET | Refills: 0 | Status: CANCELLED | OUTPATIENT
Start: 2019-10-28

## 2019-10-28 RX ORDER — ERGOCALCIFEROL 1.25 MG/1
50000 CAPSULE ORAL WEEKLY
Qty: 4 CAPSULE | Refills: 2 | Status: CANCELLED | OUTPATIENT
Start: 2019-10-28

## 2019-10-28 RX ORDER — MELATONIN
1000 DAILY
Qty: 30 TABLET | Refills: 5 | Status: SHIPPED | OUTPATIENT
Start: 2019-10-28 | End: 2021-09-10

## 2019-11-21 ASSESSMENT — ENCOUNTER SYMPTOMS
ALLERGIC/IMMUNOLOGIC NEGATIVE: 1
EYES NEGATIVE: 1
GASTROINTESTINAL NEGATIVE: 1
RESPIRATORY NEGATIVE: 1

## 2019-11-25 ENCOUNTER — OFFICE VISIT (OUTPATIENT)
Dept: BARIATRICS/WEIGHT MGMT | Age: 49
End: 2019-11-25
Payer: COMMERCIAL

## 2019-11-25 VITALS
BODY MASS INDEX: 50.02 KG/M2 | HEIGHT: 64 IN | OXYGEN SATURATION: 96 % | SYSTOLIC BLOOD PRESSURE: 122 MMHG | DIASTOLIC BLOOD PRESSURE: 60 MMHG | HEART RATE: 83 BPM | WEIGHT: 293 LBS

## 2019-11-25 DIAGNOSIS — G47.33 OBSTRUCTIVE SLEEP APNEA (ADULT) (PEDIATRIC): ICD-10-CM

## 2019-11-25 DIAGNOSIS — I10 ESSENTIAL HYPERTENSION: Primary | Chronic | ICD-10-CM

## 2019-11-25 DIAGNOSIS — E11.69 DIABETES MELLITUS TYPE 2 IN OBESE (HCC): ICD-10-CM

## 2019-11-25 DIAGNOSIS — E66.9 DIABETES MELLITUS TYPE 2 IN OBESE (HCC): ICD-10-CM

## 2019-11-25 DIAGNOSIS — E66.01 MORBID OBESITY WITH BMI OF 70 AND OVER, ADULT (HCC): Chronic | ICD-10-CM

## 2019-11-25 PROCEDURE — 99213 OFFICE O/P EST LOW 20 MIN: CPT | Performed by: NURSE PRACTITIONER

## 2019-11-25 RX ORDER — METHOCARBAMOL 750 MG/1
750 TABLET, FILM COATED ORAL
COMMUNITY
Start: 2019-11-21 | End: 2020-05-08 | Stop reason: ALTCHOICE

## 2019-11-25 RX ORDER — GLYBURIDE 2.5 MG/1
TABLET ORAL
Refills: 5 | COMMUNITY
Start: 2019-11-08

## 2019-12-19 ENCOUNTER — OFFICE VISIT (OUTPATIENT)
Dept: BARIATRICS/WEIGHT MGMT | Age: 49
End: 2019-12-19
Payer: COMMERCIAL

## 2019-12-19 VITALS
HEIGHT: 64 IN | SYSTOLIC BLOOD PRESSURE: 116 MMHG | HEART RATE: 92 BPM | DIASTOLIC BLOOD PRESSURE: 62 MMHG | BODY MASS INDEX: 50.02 KG/M2 | WEIGHT: 293 LBS | RESPIRATION RATE: 18 BRPM | OXYGEN SATURATION: 98 %

## 2019-12-19 DIAGNOSIS — E66.01 MORBID OBESITY WITH BMI OF 70 AND OVER, ADULT (HCC): Primary | Chronic | ICD-10-CM

## 2019-12-19 DIAGNOSIS — I10 ESSENTIAL HYPERTENSION: Chronic | ICD-10-CM

## 2019-12-19 DIAGNOSIS — E66.9 DIABETES MELLITUS TYPE 2 IN OBESE (HCC): ICD-10-CM

## 2019-12-19 DIAGNOSIS — E11.69 DIABETES MELLITUS TYPE 2 IN OBESE (HCC): ICD-10-CM

## 2019-12-19 DIAGNOSIS — G47.33 OBSTRUCTIVE SLEEP APNEA (ADULT) (PEDIATRIC): ICD-10-CM

## 2019-12-19 PROCEDURE — 99213 OFFICE O/P EST LOW 20 MIN: CPT | Performed by: NURSE PRACTITIONER

## 2019-12-19 ASSESSMENT — ENCOUNTER SYMPTOMS
RESPIRATORY NEGATIVE: 1
GASTROINTESTINAL NEGATIVE: 1
EYES NEGATIVE: 1

## 2019-12-20 RX ORDER — SPIRONOLACTONE 25 MG/1
25 TABLET ORAL DAILY
Qty: 30 TABLET | Refills: 1 | Status: SHIPPED | OUTPATIENT
Start: 2019-12-20 | End: 2020-03-12

## 2020-02-03 ASSESSMENT — ENCOUNTER SYMPTOMS
ALLERGIC/IMMUNOLOGIC NEGATIVE: 1
GASTROINTESTINAL NEGATIVE: 1
EYES NEGATIVE: 1
RESPIRATORY NEGATIVE: 1

## 2020-02-03 NOTE — PROGRESS NOTES
[facility]) Physicians   [employer]    Subjective:      Patient ID: Erin Simpson is a 44 y.o. female    HPI    The patient is here for their bariatric surgery presurgical visit for future weight loss. She has made several attempts at weight loss in the past without success and now wishes to pursue bariatric surgery. She is working to change her dietary behaviors and lose weight to improve comorbid conditions such as hypertension, diabetes mellitus and obstructive sleep apnea. Erin Simpson is a 44 y.o. female with Body mass index is 72.44 kg/m². Past Medical History:   Diagnosis Date    Acute on chronic combined systolic and diastolic CHF (congestive heart failure) ([facility] [address] 1/26/2018    Asthma     Cardiomyopathy ([facility]     Diabetes mellitus ([facility]     Heart murmur     Hypertension     Obstructive sleep apnea (adult) (pediatric) 1/26/2018    Sleep apnea     Urinary incontinence      History reviewed. No pertinent surgical history. Family History   Problem Relation Age of Onset    High Blood Pressure Mother     Other Mother         cardiac murmur    Arthritis Mother     Hypertension Mother     High Blood Pressure Father     Hypertension Father     Stroke Father     Arthritis Father     High Blood Pressure Sister     Diabetes Sister         prediabetic    Hypertension Sister     Hypertension Maternal Uncle     Cancer Maternal Uncle     Cancer Maternal Grandmother      Social History     Tobacco Use    Smoking status: Never Smoker    Smokeless tobacco: Never Used   Substance Use Topics    Alcohol use: No     I counseled the patient on the importance of not smoking and risks of ETOH. Allergies   Allergen Reactions    Levofloxacin Other (See Comments)     headache     Vitals:    02/14/20 0853   BP: 112/60   Pulse: 102   Weight: (!) 422 lb (191.4 kg)   Height: 5' 4\" (1.626 m)     Body mass index is 72.44 kg/m².     Current Outpatient Medications:     furosemide (LASIX) 40 MG tablet, TAKE 2 TABLETS BY MOUTH TWICE DAILY, Disp: 120 tablet, Rfl: 0    spironolactone (ALDACTONE) 25 MG tablet, TAKE 1 TABLET BY MOUTH DAILY, Disp: 30 tablet, Rfl: 1    methocarbamol (ROBAXIN) 750 MG tablet, Take 750 mg by mouth, Disp: , Rfl:     glyBURIDE (DIABETA) 2.5 MG tablet, TK 1 T PO D, Disp: , Rfl: 5    irbesartan (AVAPRO) 150 MG tablet, TAKE 1/2 TABLET BY MOUTH EVERY NIGHT, Disp: 45 tablet, Rfl: 0    carvedilol (COREG) 25 MG tablet, Take 1 tablet by mouth 2 times daily, Disp: 90 tablet, Rfl: 1    Cholecalciferol (VITAMIN D3) 25 MCG (1000 UT) TABS, Take 1 tablet by mouth daily, Disp: 30 tablet, Rfl: 5    ASPIRIN LOW DOSE 81 MG chewable tablet, CHEW AND SWALLOW ONE TABLET DAILY, Disp: 90 tablet, Rfl: 1    atorvastatin (LIPITOR) 20 MG tablet, TAKE 1 TABLET BY MOUTH EVERY NIGHT, Disp: 30 tablet, Rfl: 0    hydrOXYzine (ATARAX) 25 MG tablet, Take 25 mg by mouth every 6 hours as needed , Disp: , Rfl:     famotidine (PEPCID) 20 MG tablet, TK 1 T PO BID PRF HEARTBURN, Disp: , Rfl: 0    Alpha-D-Galactosidase (BEANO MELTAWAYS) TABS, Take 1 tablet by mouth, Disp: , Rfl:     mometasone-formoterol (DULERA) 100-5 MCG/ACT inhaler, Inhale 2 puffs into the lungs 2 times daily, Disp: 1 Inhaler, Rfl: 0    metFORMIN (GLUCOPHAGE) 500 MG tablet, Take 1 tablet by mouth 2 times daily (with meals), Disp: 60 tablet, Rfl: 3    albuterol sulfate  (90 Base) MCG/ACT inhaler, Inhale 2 puffs into the lungs, Disp: , Rfl:     omeprazole (PRILOSEC) 40 MG delayed release capsule, Take 40 mg by mouth , Disp: , Rfl:     ketoconazole (NIZORAL) 2 % cream, Apply  topically daily. Apply topically daily. , Disp: , Rfl:     Lab Results   Component Value Date    WBC 5.2 05/24/2019    RBC 4.49 05/24/2019    HGB 11.7 05/24/2019    HCT 37.7 05/24/2019    MCV 84.1 05/24/2019    MCH 26.1 05/24/2019    MCHC 31.1 05/24/2019    MPV 8.7 05/24/2019    NEUTOPHILPCT 57.3 05/24/2019    LYMPHOPCT 33.0 05/24/2019    MONOPCT 5.9 up. A total of 15 minutes was spent face-to-face with the patient and over half of that time was spent counseling the patient on proper dietary behaviors, exercise and preoperative work-up.

## 2020-02-03 NOTE — PATIENT INSTRUCTIONS
brenda as it requires significantly less oil. Baking, broiling, or grilling meats add flavor without unhealthy fats. Using cooking oil spray and spray butter products are also healthy options that will aid in your weight loss. Foods high in added sugars are often also high in calories and low in nutrients. Eating habits after surgery need to be a long-term change. Eating habits are often so ingrained that it can be difficult to change. It is important to practice new eating habits prior to surgery to mentally prepare yourself for the challenge ahead. Also, remember that overall health, age, and genetics make each person's weight loss progress different. Do not compare your progress (pre- or post-operatively), the amount you eat, or your exercise to other patients. In addition, it is the responsibility of the patient to schedule and follow up on labs and tests completed during the pre-surgical period. Results will be reviewed at each visit. **IT IS IMPORTANT TO KNOW THAT YOU MUST COMPLETE ALL REQUIRED DIETARY CHANGES, TESTS, CLEARANCES AND ACTIVITIES BEFORE A SURGERY DATE CAN BE GIVEN. IF YOU HAVE NOT MET ANY OF THESE REQUIREMENTS THEN YOU WILL NOT BE GIVEN A SURGERY DATE UNTIL THEY HAVE BEEN MET TO OUR SATISFACTION. THIS IS NOT ONLY DONE TO HELP YOU BE SUCCESSFUL AFTER SURGERY, BUT TO BE SAFE AS WELL.**    **You may receive a survey regarding the care you received during your visit. Your input is valuable to us. We encourage you to complete and return your survey. We hope you will choose us in the future for your healthcare needs. Patient received dietary handouts and education.     Plan/Recommendations:   - Meal plan and prep 4 meals and snacks/day  - Avoid fast food and sweetened beverages  - Join [employer] - aim for 2 days of exercise/week

## 2020-02-14 ENCOUNTER — OFFICE VISIT (OUTPATIENT)
Dept: BARIATRICS/WEIGHT MGMT | Age: 50
End: 2020-02-14
Payer: COMMERCIAL

## 2020-02-14 VITALS
BODY MASS INDEX: 50.02 KG/M2 | DIASTOLIC BLOOD PRESSURE: 60 MMHG | HEART RATE: 102 BPM | SYSTOLIC BLOOD PRESSURE: 112 MMHG | HEIGHT: 64 IN | WEIGHT: 293 LBS

## 2020-02-14 PROCEDURE — 99213 OFFICE O/P EST LOW 20 MIN: CPT | Performed by: NURSE PRACTITIONER

## 2020-02-14 NOTE — PROGRESS NOTES
Malena Woodson gained 3 lbs over 2 months. Pt reports she was on prednisone at end of January and has not been on a regular routine. Pt began talking about trying the keto diet and using various resources to plana dn prep for this. Explained that keto is not appropriate if she would like to pursue surgery but that planning and prepping is a crucial skill to long-term success. Breakfast: 2 scrambled eggs w/ cheese + 4 slices coley or 2 sausage devorah    Snack: Sometimes Glucerna     Lunch: 2 Slices pepperoni pizza    Snack: none    Dinner: Chickfila sandwich on 1/2 bun + mac'n'cheese     Has pt decreased your portions using the plate method and choosing low fat/sugar free options? no    Is pt drinking 48-64 oz of clear liquids everyday? yes 4 bottles water    Has pt stopped drinking carbonation, caffeinated, and sugar sweetened beverages? yes   tea when sick, OJ, pop    Has pt sampled Unjury and/or Nectar protein?  yes      Participating in intentional exercise? yes [employer] [facility] aerobics as a guest    Plan/Recommendations:   - Meal plan and prep 4 meals and snacks/day  - Avoid fast food and sweetened beverages  - Join [employer] - aim for 2 days of exercise/week    Handouts: none - encouraged pt to review resources from previous [employer]

## 2020-03-03 ASSESSMENT — ENCOUNTER SYMPTOMS
EYES NEGATIVE: 1
GASTROINTESTINAL NEGATIVE: 1
RESPIRATORY NEGATIVE: 1
ALLERGIC/IMMUNOLOGIC NEGATIVE: 1

## 2020-03-03 NOTE — PROGRESS NOTES
[facility]) Physicians   [employer]    Subjective:      Patient ID: Yuridia Yu is a 44 y.o. female    HPI    The patient is here for their bariatric surgery presurgical visit for future weight loss. She has made several attempts at weight loss in the past without success and now wishes to pursue bariatric surgery. She is working to change her dietary behaviors and lose weight to improve comorbid conditions such as hypertension, diabetes mellitus and obstructive sleep apnea. Yuridia Yu is a 44 y.o. female with Body mass index is 72.09 kg/m². Past Medical History:   Diagnosis Date    Acute on chronic combined systolic and diastolic CHF (congestive heart failure) ([facility] 1/26/2018    Asthma     Cardiomyopathy ([facility]     Diabetes mellitus ([facility]     Heart murmur     Hypertension     Obstructive sleep apnea (adult) (pediatric) 1/26/2018    Sleep apnea     Urinary incontinence      History reviewed. No pertinent surgical history. Family History   Problem Relation Age of Onset    High Blood Pressure Mother     Other Mother         cardiac murmur    Arthritis Mother     Hypertension Mother     High Blood Pressure Father     Hypertension Father     Stroke Father     Arthritis Father     High Blood Pressure Sister     Diabetes Sister         prediabetic    Hypertension Sister     Hypertension Maternal Uncle     Cancer Maternal Uncle     Cancer Maternal Grandmother      Social History     Tobacco Use    Smoking status: Never Smoker    Smokeless tobacco: Never Used   Substance Use Topics    Alcohol use: No     I counseled the patient on the importance of not smoking and risks of ETOH. Allergies   Allergen Reactions    Levofloxacin Other (See Comments)     headache     Vitals:    03/13/20 0947   BP: 117/60   Pulse: 103   SpO2: 98%   Weight: (!) 420 lb (190.5 kg)   Height: 5' 4\" (1.626 m)     Body mass index is 72.09 kg/m².     Current Outpatient Medications:    spironolactone (ALDACTONE) 25 MG tablet, TAKE 1 TABLET BY MOUTH DAILY, Disp: 30 tablet, Rfl: 0    furosemide (LASIX) 40 MG tablet, TAKE 2 TABLETS BY MOUTH TWICE DAILY, Disp: 120 tablet, Rfl: 0    carvedilol (COREG) 25 MG tablet, Take 1 tablet by mouth 2 times daily, Disp: 180 tablet, Rfl: 3    methocarbamol (ROBAXIN) 750 MG tablet, Take 750 mg by mouth, Disp: , Rfl:     glyBURIDE (DIABETA) 2.5 MG tablet, TK 1 T PO D, Disp: , Rfl: 5    irbesartan (AVAPRO) 150 MG tablet, TAKE 1/2 TABLET BY MOUTH EVERY NIGHT, Disp: 45 tablet, Rfl: 0    Cholecalciferol (VITAMIN D3) 25 MCG (1000 UT) TABS, Take 1 tablet by mouth daily, Disp: 30 tablet, Rfl: 5    ASPIRIN LOW DOSE 81 MG chewable tablet, CHEW AND SWALLOW ONE TABLET DAILY, Disp: 90 tablet, Rfl: 1    atorvastatin (LIPITOR) 20 MG tablet, TAKE 1 TABLET BY MOUTH EVERY NIGHT, Disp: 30 tablet, Rfl: 0    hydrOXYzine (ATARAX) 25 MG tablet, Take 25 mg by mouth every 6 hours as needed , Disp: , Rfl:     famotidine (PEPCID) 20 MG tablet, TK 1 T PO BID PRF HEARTBURN, Disp: , Rfl: 0    Alpha-D-Galactosidase (BEANO MELTAWAYS) TABS, Take 1 tablet by mouth, Disp: , Rfl:     mometasone-formoterol (DULERA) 100-5 MCG/ACT inhaler, Inhale 2 puffs into the lungs 2 times daily, Disp: 1 Inhaler, Rfl: 0    metFORMIN (GLUCOPHAGE) 500 MG tablet, Take 1 tablet by mouth 2 times daily (with meals), Disp: 60 tablet, Rfl: 3    albuterol sulfate  (90 Base) MCG/ACT inhaler, Inhale 2 puffs into the lungs, Disp: , Rfl:     omeprazole (PRILOSEC) 40 MG delayed release capsule, Take 40 mg by mouth , Disp: , Rfl:     ketoconazole (NIZORAL) 2 % cream, Apply  topically daily. Apply topically daily. , Disp: , Rfl:     Lab Results   Component Value Date    WBC 5.2 05/24/2019    RBC 4.49 05/24/2019    HGB 11.7 05/24/2019    HCT 37.7 05/24/2019    MCV 84.1 05/24/2019    MCH 26.1 05/24/2019    MCHC 31.1 05/24/2019    MPV 8.7 05/24/2019    NEUTOPHILPCT 57.3 05/24/2019    LYMPHOPCT 33.0 05/24/2019 MONOPCT 5.9 05/24/2019    EOSRELPCT 3.6 05/24/2019    BASOPCT 0.2 05/24/2019    NEUTROABS 3.0 05/24/2019    LYMPHSABS 1.7 05/24/2019    MONOSABS 0.3 05/24/2019    EOSABS 0.2 05/24/2019     Lab Results   Component Value Date     09/05/2019    K 4.1 09/05/2019    CL 98 09/05/2019    CO2 28 09/05/2019    ANIONGAP 12 09/05/2019    GLUCOSE 99 09/05/2019    BUN 17 09/05/2019    CREATININE 1.0 09/05/2019    LABGLOM 58 09/05/2019    GFRAA >60 09/05/2019    CALCIUM 9.4 09/05/2019    PROT 6.9 05/24/2019    LABALBU 4.3 05/24/2019    AGRATIO 1.7 05/24/2019    BILITOT 0.5 05/24/2019    ALKPHOS 141 05/24/2019    ALT 15 05/24/2019    AST 15 05/24/2019    GLOB 2.6 05/24/2019     Lab Results   Component Value Date    CHOL 149 05/24/2019    TRIG 65 05/24/2019    HDL 53 05/24/2019    LDLCALC 83 05/24/2019    LABVLDL 13 05/24/2019     Lab Results   Component Value Date    TSHREFLEX 2.01 05/24/2019     Lab Results   Component Value Date    IRON 56 05/24/2019    TIBC 370 05/24/2019    LABIRON 15 05/24/2019     Lab Results   Component Value Date    LBPAZBEV03 671 05/24/2019    FOLATE 10.59 05/24/2019     Lab Results   Component Value Date    VITD25 17.5 05/24/2019     Lab Results   Component Value Date    LABA1C 7.4 05/24/2019    .7 05/24/2019     Review of Systems   Constitutional: Negative. HENT: Negative. Eyes: Negative. Respiratory: Negative. Cardiovascular: Negative. Gastrointestinal: Negative. Endocrine: Negative. Genitourinary: Negative. Musculoskeletal: Negative. Skin: Negative. Allergic/Immunologic: Negative. Neurological: Negative. Hematological: Negative. Psychiatric/Behavioral: Negative. Objective:   Physical Exam  Vitals signs reviewed. Constitutional:       Appearance: She is well-developed. HENT:      Head: Normocephalic and atraumatic. Eyes:      Conjunctiva/sclera: Conjunctivae normal.      Pupils: Pupils are equal, round, and reactive to light.    Neck:

## 2020-03-12 RX ORDER — CARVEDILOL 25 MG/1
25 TABLET ORAL 2 TIMES DAILY
Qty: 180 TABLET | Refills: 3 | Status: SHIPPED | OUTPATIENT
Start: 2020-03-12 | End: 2020-11-09 | Stop reason: SDUPTHER

## 2020-03-13 ENCOUNTER — OFFICE VISIT (OUTPATIENT)
Dept: BARIATRICS/WEIGHT MGMT | Age: 50
End: 2020-03-13
Payer: COMMERCIAL

## 2020-03-13 VITALS
HEART RATE: 103 BPM | DIASTOLIC BLOOD PRESSURE: 60 MMHG | OXYGEN SATURATION: 98 % | WEIGHT: 293 LBS | HEIGHT: 64 IN | SYSTOLIC BLOOD PRESSURE: 117 MMHG | BODY MASS INDEX: 50.02 KG/M2

## 2020-03-13 PROCEDURE — 99213 OFFICE O/P EST LOW 20 MIN: CPT | Performed by: NURSE PRACTITIONER

## 2020-04-03 ENCOUNTER — TELEPHONE (OUTPATIENT)
Dept: PULMONOLOGY | Age: 50
End: 2020-04-03

## 2020-05-04 RX ORDER — SPIRONOLACTONE 25 MG/1
25 TABLET ORAL DAILY
Qty: 5 TABLET | Refills: 0 | Status: SHIPPED | OUTPATIENT
Start: 2020-05-04 | End: 2020-05-08 | Stop reason: SDUPTHER

## 2020-05-04 RX ORDER — FUROSEMIDE 40 MG/1
TABLET ORAL
Qty: 20 TABLET | Refills: 0 | Status: SHIPPED | OUTPATIENT
Start: 2020-05-04 | End: 2020-05-08 | Stop reason: SDUPTHER

## 2020-05-04 NOTE — TELEPHONE ENCOUNTER
She made an appt for this Friday . Can she get refill to last her to that appt ?  She is totally out of both meds

## 2020-05-05 ENCOUNTER — TELEPHONE (OUTPATIENT)
Dept: CARDIOLOGY CLINIC | Age: 50
End: 2020-05-05

## 2020-05-05 NOTE — TELEPHONE ENCOUNTER
Pt has an appt on 5/8 and wants to change it to a VV. Where can she be added. Please advise and call pt to reschedule.

## 2020-05-07 NOTE — PROGRESS NOTES
[facility]  Advanced CHF/Pulmonary Hypertension   Cardiac Evaluation      Aubrey Rivero  YOB: 1970    Date of Visit:  20      Chief Complaint   Patient presents with    Check-Up    Shortness of Breath     occasionally    Edema     a little in feet at night        History of Present Illness:  Aubrey Rivero is a 44 y.o. female who presents from referral from Myles Treviño NP for consultation and management of combined diastolic and systolic heart failure in 2018. She has a past medical history of HTN, DM, TRUE on Bipap, asthma and morbid obesity. She is following with [address] [facility] loss for possible bariatric surgery. Aubrey Rivero is a 44 y.o. female evaluated via Video Visit on 2020. Consent:  She and/or health care decision maker is aware that that she may receive a bill for this telephone service, depending on her insurance coverage, and has provided verbal consent to proceed: Yes      Documentation:  I communicated with the patient and/or health care decision maker about medications, labs and plan of care. Details of this discussion including any medical advice provided: see below      I affirm this is a Patient Initiated Episode with an Established Patient who has not had a related appointment within my department in the past 7 days or scheduled within the next 24 hours. Total Time: minutes: 11-20 minutes    Note: not billable if this call serves to triage the patient into an appointment for the relevant concern    2020    TELEHEALTH EVALUATION -- Audio/Visual (During [facility] public health emergency)      Aubrey Rivero (:  1970) has requested an audio/video evaluation for the following concern(s):    Today she states she feels ok. 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 She states she is doing the unemployment claims and they are extremely busy. She states she has SOB with walking. Reported on 5/8/2020) 60 tablet 3     No current facility-administered medications for this visit. Past Medical History:   Diagnosis Date    Acute on chronic combined systolic and diastolic CHF (congestive heart failure) ([facility] 1/26/2018    Asthma     [facility]     Diabetes mellitus ([facility]     Heart murmur     Hypertension     Obstructive sleep apnea (adult) (pediatric) 1/26/2018    Sleep apnea     Urinary incontinence      History reviewed. No pertinent surgical history.   Family History   Problem Relation Age of Onset    High Blood Pressure Mother     Other Mother         cardiac murmur    Arthritis Mother     Hypertension Mother     High Blood Pressure Father     Hypertension Father     Stroke Father     Arthritis Father     High Blood Pressure Sister     Diabetes Sister         prediabetic    Hypertension Sister     Hypertension Maternal Uncle     Cancer Maternal Uncle     Cancer Maternal Grandmother      Social History     Socioeconomic History    Marital status: Single     Spouse name: Not on file    Number of children: 3    Years of education: Not on file    Highest education level: Not on file   Occupational History    Not on file   Social Needs    Financial resource strain: Not on file    Food insecurity     Worry: Not on file     Inability: Not on file   [employer] needs     Medical: Not on file     Non-medical: Not on file   Tobacco Use    Smoking status: Never Smoker    Smokeless tobacco: Never Used   Substance and Sexual Activity    Alcohol use: No    Drug use: No    Sexual activity: Never   Lifestyle    Physical activity     Days per week: Not on file     Minutes per session: Not on file    Stress: Not on file   Relationships    Social connections     Talks on phone: Not on file     Gets together: Not on file     Attends [employer] service: Not on file     Active member of club or organization: Not on file     Attends meetings of clubs or (!) 108/44   03/13/20 117/60   02/14/20 112/60       Assessment:    1. Chronic systolic heart failure ([address]    2. Essential hypertension    3. Morbid obesity ([address]    4. TRUE (obstructive sleep apnea)    5. SOB (shortness of breath)    6. Vitamin D deficiency    7. Hyperlipidemia, unspecified hyperlipidemia type        1. Chronic systolic heart failure (HCC) Appears compensated.   -Continue ARB, Lasix, spironolactone, and Coreg.  -Check heart failure labs soon.   -Continue to work on weight loss. 2. Essential hypertension:  BP (!) 108/44 (Site: Left Wrist, Position: Sitting) Comment: taken by patient at home  Pulse 92   Stable. 3. Morbid obesity ([address]): Working with [employer] loss and making dietary changes. 4. TRUE (obstructive sleep apnea):  Wearing Bipap. Follows with Dr. Leigh Ann Schulte. Plan:  1. Wear light compression stockings during the day and night. Elevate legs or lie close to flat as much as possible for sleep. 2.  Continue to wear  Bipap at night. 3.  Get fasting blood work soon. 4.  See Dr. Claudia Hatfield in 6 months. Scribe's attestation: This note was scribed in the presence of Antonio Johnson M.D. by Osiel Christina RN     The scribe's documentation has been prepared under my direction and personally reviewed by me in its entirety. I confirm that the note above accurately reflects all work, treatment, procedures, and medical decision making performed by me. QUALITY MEASURES  1. Tobacco Cessation Counseling: NA  2. Retake of BP if >140/90:   NA  3. Documentation to PCP/referring for new patient:  Sent to PCP at close of office visit  4. CAD patient on anti-platelet: NA  5. CAD patient on STATIN therapy:  NA  6. Patient with CHF and aFib on anticoagulation:  NA         I appreciate the opportunity of cooperating in the care of this patient.     Antonio Johnson M.D., [facility]

## 2020-05-08 ENCOUNTER — VIRTUAL VISIT (OUTPATIENT)
Dept: CARDIOLOGY CLINIC | Age: 50
End: 2020-05-08
Payer: COMMERCIAL

## 2020-05-08 VITALS — SYSTOLIC BLOOD PRESSURE: 108 MMHG | DIASTOLIC BLOOD PRESSURE: 44 MMHG | HEART RATE: 92 BPM

## 2020-05-08 PROCEDURE — 99244 OFF/OP CNSLTJ NEW/EST MOD 40: CPT | Performed by: INTERNAL MEDICINE

## 2020-05-08 RX ORDER — FUROSEMIDE 40 MG/1
TABLET ORAL
Qty: 120 TABLET | Refills: 11 | Status: SHIPPED | OUTPATIENT
Start: 2020-05-08 | End: 2020-11-09 | Stop reason: SDUPTHER

## 2020-05-08 RX ORDER — SPIRONOLACTONE 25 MG/1
25 TABLET ORAL DAILY
Qty: 90 TABLET | Refills: 1 | Status: SHIPPED | OUTPATIENT
Start: 2020-05-08 | End: 2020-09-24

## 2020-05-08 RX ORDER — IRBESARTAN 150 MG/1
TABLET ORAL
Qty: 45 TABLET | Refills: 3 | Status: SHIPPED | OUTPATIENT
Start: 2020-05-08 | End: 2020-11-09 | Stop reason: SDUPTHER

## 2020-05-08 NOTE — PATIENT INSTRUCTIONS
Plan:  1. Wear light compression stockings during the day and night. Elevate legs or lie close to flat as much as possible for sleep. 2.  Continue to wear  Bipap at night. 3.  Get fasting blood work soon. 4. See Dr. Cecil Patterson in 6 months.

## 2020-05-15 ENCOUNTER — VIRTUAL VISIT (OUTPATIENT)
Dept: PULMONOLOGY | Age: 50
End: 2020-05-15
Payer: COMMERCIAL

## 2020-05-15 PROCEDURE — 99214 OFFICE O/P EST MOD 30 MIN: CPT | Performed by: NURSE PRACTITIONER

## 2020-05-15 ASSESSMENT — SLEEP AND FATIGUE QUESTIONNAIRES
HOW LIKELY ARE YOU TO NOD OFF OR FALL ASLEEP WHILE SITTING AND TALKING TO SOMEONE: 0
HOW LIKELY ARE YOU TO NOD OFF OR FALL ASLEEP WHILE SITTING AND READING: 1
HOW LIKELY ARE YOU TO NOD OFF OR FALL ASLEEP WHILE SITTING INACTIVE IN A PUBLIC PLACE: 0
HOW LIKELY ARE YOU TO NOD OFF OR FALL ASLEEP IN A CAR, WHILE STOPPED FOR A FEW MINUTES IN TRAFFIC: 0
ESS TOTAL SCORE: 3
HOW LIKELY ARE YOU TO NOD OFF OR FALL ASLEEP WHEN YOU ARE A PASSENGER IN A CAR FOR AN HOUR WITHOUT A BREAK: 0
HOW LIKELY ARE YOU TO NOD OFF OR FALL ASLEEP WHILE WATCHING TV: 1
HOW LIKELY ARE YOU TO NOD OFF OR FALL ASLEEP WHILE SITTING QUIETLY AFTER LUNCH WITHOUT ALCOHOL: 0
HOW LIKELY ARE YOU TO NOD OFF OR FALL ASLEEP WHILE LYING DOWN TO REST IN THE AFTERNOON WHEN CIRCUMSTANCES PERMIT: 1

## 2020-05-15 NOTE — LETTER
[facility] Sleep Medicine  [address] [address]  Phone: [phone]  Fax: [phone]    May 15, 2020       Patient: Jacinto Lara   MR Number: [id number]   YOB: 1970   Date of Visit: 5/15/2020       Gracia Vasquez was seen for a follow up visit today. Here is my assessment and plan as well as an attached copy of her visit today: Morbid obesity with BMI of 70 and over, adult ([facility]  Chronic-Stable. Encouraged her to work on weight loss through diet and exercise. Essential hypertension  Chronic- Stable. Cont meds per PCP and other physicians. Diabetes mellitus type 2 in obese (HCC)  Chronic- Stable. Cont meds per PCP and other physicians. Chronic combined systolic and diastolic CHF (congestive heart failure) (HCC)  Chronic- Stable. Cont meds per PCP and other physicians. Acute systolic heart failure (HCC)  Chronic- Stable. Cont meds per PCP and other physicians. Obstructive sleep apnea (adult) (pediatric)  Reviewed compliance download with pt. Supplies and parts as needed for her machine. These are medically necessary. Continue medications per her PCP and other physicians. Limit caffeine use after 3pm.  Encouraged her to work on weight loss through diet and exercise. Diagnoses of Morbid obesity with BMI of 70 and over, adult [facility]), Essential hypertension, Diabetes mellitus type 2 in obese [facility]), Chronic combined systolic and diastolic CHF (congestive heart failure) ([facility] and Acute systolic heart failure ([facility] were pertinent to this visit. The chronic medical conditions listed are directly related to the primary diagnosis listed above. The management of the primary diagnosis affects the secondary diagnosis and vice versa. If you have questions or concerns, please do not hesitate to call me. I look forward to following Malena along with you.     Sincerely,    Jai Contreras, ADDI - CNP

## 2020-05-15 NOTE — PROGRESS NOTES
Dayami Rousseau MD, FAASM, [facility]  Elsie Landau, MSN, RN, CNP     [facility]  [address] [address]  Dept: [phone]  Dept Fax: : [phone] SLEEP MEDICINE  [address] [place] [id number] [phone]    Subjective:     Patient ID: Trisha Arevalo is a 44 y.o. female. Chief Complaint   Patient presents with    Sleep Apnea       HPI:      Sleep Medicine Video Visit    Pursuant to the emergency declaration under the [facility], [facility] waiver authority and the [employer] Act this Telephone Visit was insisted, with patient's consent, to reduce the patient's risk of exposure to COVID-19 and provide continuity of care for an established patient. Services were provided through a synchronous discussion over a telephone and/or Video chat to substitute for in-person clinic visit, and coded as such. Machine Modem/Download Info:  Compliance (hours/night): 3.5 hrs/night  Download AHI (/hour): 0.4 /HR     Average IPAP Pressure: 20.4 cmH2O  Average EPAP Pressure: 13.4 cmH2O         AUTO BIPAP - Settings (Myles)  IPAP Max: 25 cmH2O  EPAP Min: 13 cmH2O  Pressure Support Min: 6  Pressure Support Max: 8             Comfort Settings  Humidity Level (0-8): 2  Flex/EPR (0-3): 3 PAP Mask  Mask Type: Full Face mask     [place] - Total score: 3    Follow-up :     Last Visit : August 2019      Patient reports the listed chronic Co-morbidities: Obesity, HTN, DM, CHF,    are well controlled and stable at this time.      Subjective Health Changes: None      Over Night Oximetry: [] Yes  [] No  [x] NA [] WNL   Using O2: [] Yes  [] No  [x] NA   Patient is compliant with the machine  [] Yes  [x] No   Feeling rested when using the machine   [x] Yes  [] No     Pressure is death.   - she understands and accepts all these risks. - The patient is advised to use the machine every night.   - Patient using  [employer] for supplies  - - Patient able to access video feed. Visit completed via video chat communications. 20 min spent with patient.   - Educated on Supplies  -F/U: 6 months      No orders of the defined types were placed in this encounter. No orders of the defined types were placed in this encounter. No orders of the defined types were placed in this encounter.       Nic Red, MSN, RN, CNP

## 2020-06-26 RX ORDER — ASPIRIN 81 MG/1
TABLET, CHEWABLE ORAL
Qty: 90 TABLET | Refills: 2 | Status: SHIPPED | OUTPATIENT
Start: 2020-06-26 | End: 2021-07-22

## 2020-07-20 ENCOUNTER — HOSPITAL ENCOUNTER (OUTPATIENT)
Age: 50
Discharge: HOME | End: 2020-07-20
Payer: COMMERCIAL

## 2020-07-20 LAB
A/G RATIO: 1.3 (ref 1.1–2.2)
ALBUMIN SERPL-MCNC: 4.1 G/DL (ref 3.4–5)
ALP BLD-CCNC: 124 U/L (ref 40–129)
ALT SERPL-CCNC: 15 U/L (ref 10–40)
ANION GAP SERPL CALCULATED.3IONS-SCNC: 11 MMOL/L (ref 3–16)
AST SERPL-CCNC: 15 U/L (ref 15–37)
BILIRUB SERPL-MCNC: <0.2 MG/DL (ref 0–1)
BUN BLDV-MCNC: 17 MG/DL (ref 7–20)
CALCIUM SERPL-MCNC: 9.5 MG/DL (ref 8.3–10.6)
CHLORIDE BLD-SCNC: 99 MMOL/L (ref 99–110)
CHOLESTEROL, TOTAL: 175 MG/DL (ref 0–199)
CO2: 27 MMOL/L (ref 21–32)
CREAT SERPL-MCNC: 1 MG/DL (ref 0.6–1.1)
GFR AFRICAN AMERICAN: >60
GFR NON-AFRICAN AMERICAN: 58
GLOBULIN: 3.2 G/DL
GLUCOSE BLD-MCNC: 123 MG/DL (ref 70–99)
HCT VFR BLD CALC: 35.1 % (ref 36–48)
HDLC SERPL-MCNC: 47 MG/DL (ref 40–60)
HEMOGLOBIN: 10.4 G/DL (ref 12–16)
LDL CHOLESTEROL CALCULATED: 118 MG/DL
MCH RBC QN AUTO: 21.2 PG (ref 26–34)
MCHC RBC AUTO-ENTMCNC: 29.7 G/DL (ref 31–36)
MCV RBC AUTO: 71.4 FL (ref 80–100)
PDW BLD-RTO: 25.2 % (ref 12.4–15.4)
PLATELET # BLD: 302 K/UL (ref 135–450)
PMV BLD AUTO: 9.3 FL (ref 5–10.5)
POTASSIUM SERPL-SCNC: 4.9 MMOL/L (ref 3.5–5.1)
PRO-BNP: 56 PG/ML (ref 0–124)
RBC # BLD: 4.91 M/UL (ref 4–5.2)
SODIUM BLD-SCNC: 137 MMOL/L (ref 136–145)
TOTAL PROTEIN: 7.3 G/DL (ref 6.4–8.2)
TRIGL SERPL-MCNC: 48 MG/DL (ref 0–150)
VITAMIN D 25-HYDROXY: 22.9 NG/ML
VLDLC SERPL CALC-MCNC: 10 MG/DL
WBC # BLD: 5.5 K/UL (ref 4–11)

## 2020-07-20 PROCEDURE — 85027 COMPLETE CBC AUTOMATED: CPT

## 2020-07-20 PROCEDURE — 36415 COLL VENOUS BLD VENIPUNCTURE: CPT

## 2020-07-20 PROCEDURE — 83880 ASSAY OF NATRIURETIC PEPTIDE: CPT

## 2020-07-20 PROCEDURE — 80061 LIPID PANEL: CPT

## 2020-07-20 PROCEDURE — 80053 COMPREHEN METABOLIC PANEL: CPT

## 2020-07-20 PROCEDURE — 82306 VITAMIN D 25 HYDROXY: CPT

## 2020-07-23 ENCOUNTER — TELEPHONE (OUTPATIENT)
Dept: CARDIOLOGY CLINIC | Age: 50
End: 2020-07-23

## 2020-07-23 ENCOUNTER — PATIENT MESSAGE (OUTPATIENT)
Dept: CARDIOLOGY CLINIC | Age: 50
End: 2020-07-23

## 2020-07-28 NOTE — TELEPHONE ENCOUNTER
From: ADDI Elizabeth CNP  To: Qing Guillermo  Sent: 7/23/2020 4:18 PM EDT  Subject: labs    Hi Malena, I am covering for Dr. Tristan Gramajo and have reviewed your labs. Your Vitamin D is still low, we should call you in a prescription for that. Your LDL cholesterol is higher then before, I would recommend that we increase your lipitor to 40mg and repeat fasting labs in 6 weeks.  Ashley Rashid, CNP

## 2020-07-31 RX ORDER — ATORVASTATIN CALCIUM 40 MG/1
TABLET, FILM COATED ORAL
Qty: 90 TABLET | Refills: 1 | Status: SHIPPED | OUTPATIENT
Start: 2020-07-31 | End: 2020-11-09 | Stop reason: SDUPTHER

## 2020-07-31 NOTE — TELEPHONE ENCOUNTER
We increased this medication back when we received lab results- pended the increased dose and is ready for signature

## 2020-08-30 ASSESSMENT — ENCOUNTER SYMPTOMS
ALLERGIC/IMMUNOLOGIC NEGATIVE: 1
COUGH: 0
RESPIRATORY NEGATIVE: 1
GASTROINTESTINAL NEGATIVE: 1
SHORTNESS OF BREATH: 0
EYES NEGATIVE: 1

## 2020-08-30 NOTE — PROGRESS NOTES
[facility]) Physicians   [employer]    8/31/2020    TELEHEALTH EVALUATION -- Audio/Visual (During KJBNG-62 public health emergency)    Subjective:      Patient ID: Robin Foote is a 44 y.o. female has requested an audio/video evaluation. HPI    Due to the COVID-19 restrictions on close contact interactions the patient's monthly presurgical visit was conducted via audio/video in angela of a face to face visit. Patient has consented to have this visit conducted via audio/video and I am conducting it from the office. The patient is here through telemedicine for their bariatric surgery presurgical visit for future weight loss. She has made several attempts at weight loss in the past without success and now wishes to pursue bariatric surgery. She is working to change her dietary behaviors and lose weight to improve comorbid conditions such as hypertension, diabetes mellitus, and obstructive sleep apnea. Robin Foote is a 44 y.o. female with Body mass index is 73.29 kg/m². Past Medical History:   Diagnosis Date    Acute on chronic combined systolic and diastolic CHF (congestive heart failure) ([address] 1/26/2018    Asthma     Cardiomyopathy ([address]     Diabetes mellitus ([address]     Heart murmur     Hypertension     Obstructive sleep apnea (adult) (pediatric) 1/26/2018    Sleep apnea     Urinary incontinence      No past surgical history on file.   Family History   Problem Relation Age of Onset    High Blood Pressure Mother     Other Mother         cardiac murmur    Arthritis Mother     Hypertension Mother     High Blood Pressure Father     Hypertension Father     Stroke Father     Arthritis Father     High Blood Pressure Sister     Diabetes Sister         prediabetic    Hypertension Sister     Hypertension Maternal Uncle     Cancer Maternal Uncle     Cancer Maternal Grandmother      Social History     Tobacco Use    Smoking status: Never Smoker    Smokeless tobacco: Never Used   Substance Use Topics    Alcohol use: No     I counseled the patient on the importance of not smoking and risks of ETOH. Allergies   Allergen Reactions    Levofloxacin Other (See Comments)     headache     Vitals:    08/31/20 1259   Weight: (!) 427 lb (193.7 kg)   Height: 5' 4\" (1.626 m)     Body mass index is 73.29 kg/m². Current Outpatient Medications:     atorvastatin (LIPITOR) 40 MG tablet, TAKE 1 TABLET BY MOUTH EVERY NIGHT, Disp: 90 tablet, Rfl: 1    aspirin 81 MG chewable tablet, Chew and swallow 1 tablet by mouth daily , Disp: 90 tablet, Rfl: 2    spironolactone (ALDACTONE) 25 MG tablet, Take 1 tablet by mouth daily, Disp: 90 tablet, Rfl: 1    furosemide (LASIX) 40 MG tablet, TAKE 2 TABLETS BY MOUTH TWICE DAILY, Disp: 120 tablet, Rfl: 11    irbesartan (AVAPRO) 150 MG tablet, TAKE 1/2 TABLET BY MOUTH EVERY NIGHT, Disp: 45 tablet, Rfl: 3    carvedilol (COREG) 25 MG tablet, Take 1 tablet by mouth 2 times daily, Disp: 180 tablet, Rfl: 3    glyBURIDE (DIABETA) 2.5 MG tablet, TK 1 T PO D, Disp: , Rfl: 5    Cholecalciferol (VITAMIN D3) 25 MCG (1000 UT) TABS, Take 1 tablet by mouth daily, Disp: 30 tablet, Rfl: 5    famotidine (PEPCID) 20 MG tablet, Take 20 mg by mouth 2 times daily as needed , Disp: , Rfl: 0    Alpha-D-Galactosidase (BEANO MELTAWAYS) TABS, Take 1 tablet by mouth, Disp: , Rfl:     mometasone-formoterol (DULERA) 100-5 MCG/ACT inhaler, Inhale 2 puffs into the lungs 2 times daily (Patient not taking: Reported on 5/8/2020), Disp: 1 Inhaler, Rfl: 0    metFORMIN (GLUCOPHAGE) 500 MG tablet, Take 1 tablet by mouth 2 times daily (with meals) (Patient not taking: Reported on 5/8/2020), Disp: 60 tablet, Rfl: 3    albuterol sulfate  (90 Base) MCG/ACT inhaler, Inhale 2 puffs into the lungs, Disp: , Rfl:     ketoconazole (NIZORAL) 2 % cream, Apply topically daily as needed Apply topically daily.  , Disp: , Rfl:     Lab Results   Component Value Date    WBC 5.5 07/20/2020    RBC 4.91 07/20/2020    HGB 10.4 07/20/2020    HCT 35.1 07/20/2020    MCV 71.4 07/20/2020    MCH 21.2 07/20/2020    MCHC 29.7 07/20/2020    MPV 9.3 07/20/2020    NEUTOPHILPCT 57.3 05/24/2019    LYMPHOPCT 33.0 05/24/2019    MONOPCT 5.9 05/24/2019    EOSRELPCT 3.6 05/24/2019    BASOPCT 0.2 05/24/2019    NEUTROABS 3.0 05/24/2019    LYMPHSABS 1.7 05/24/2019    MONOSABS 0.3 05/24/2019    EOSABS 0.2 05/24/2019     Lab Results   Component Value Date     07/20/2020    K 4.9 07/20/2020    CL 99 07/20/2020    CO2 27 07/20/2020    ANIONGAP 11 07/20/2020    GLUCOSE 123 07/20/2020    BUN 17 07/20/2020    CREATININE 1.0 07/20/2020    LABGLOM 58 07/20/2020    GFRAA >60 07/20/2020    CALCIUM 9.5 07/20/2020    PROT 7.3 07/20/2020    LABALBU 4.1 07/20/2020    AGRATIO 1.3 07/20/2020    BILITOT <0.2 07/20/2020    ALKPHOS 124 07/20/2020    ALT 15 07/20/2020    AST 15 07/20/2020    GLOB 3.2 07/20/2020     Lab Results   Component Value Date    CHOL 175 07/20/2020    TRIG 48 07/20/2020    HDL 47 07/20/2020    LDLCALC 118 07/20/2020    LABVLDL 10 07/20/2020     Lab Results   Component Value Date    TSHREFLEX 2.01 05/24/2019     Lab Results   Component Value Date    IRON 56 05/24/2019    TIBC 370 05/24/2019    LABIRON 15 05/24/2019     Lab Results   Component Value Date    JBCMXTPV25 671 05/24/2019    FOLATE 10.59 05/24/2019     Lab Results   Component Value Date    VITD25 22.9 07/20/2020     Lab Results   Component Value Date    LABA1C 7.4 05/24/2019    .7 05/24/2019     Review of Systems   Constitutional: Negative. Negative for chills, fatigue and fever. HENT: Negative. Eyes: Negative. Respiratory: Negative. Negative for cough and shortness of breath. Cardiovascular: Negative. Gastrointestinal: Negative. Endocrine: Negative. Genitourinary: Negative. Musculoskeletal: Negative. Skin: Negative. Allergic/Immunologic: Negative. Neurological: Negative. Hematological: Negative. Psychiatric/Behavioral: Negative. PHYSICAL EXAMINATION:    Constitutional: [x] Appears well-developed and well-nourished [x] No apparent distress      [] Abnormal-   Mental status  [x] Alert and awake  [x] Oriented to person/place/time [x]Able to follow commands      Eyes:  EOM    [x]  Normal  [] Abnormal-  Sclera  [x]  Normal  [] Abnormal -         Discharge [x]  None visible  [] Abnormal -    HENT:   [x] Normocephalic, atraumatic. [] Abnormal     Neck: [x] No visualized mass     Pulmonary/Chest: [x] Respiratory effort normal.  [x] No visualized signs of difficulty breathing or respiratory distress        [] Abnormal-      Musculoskeletal:   [] Normal gait with no signs of ataxia         [x] Normal range of motion of neck        [] Abnormal-     Neurological:        [x] No Facial Asymmetry (Cranial nerve 7 motor function) (limited exam to video visit)          [x] No gaze palsy        [] Abnormal-         Skin:        [x] No significant exanthematous lesions or discoloration noted on facial skin         [] Abnormal-            Psychiatric:       [x] Normal Affect [x] No Hallucinations        [] Abnormal-     Other pertinent observable physical exam findings-     Due to this being a TeleHealth encounter, evaluation of the following organ systems is limited: Vitals/Constitutional/EENT/Resp/CV/GI//MS/Neuro/Skin/Heme-Lymph-Imm. Assessment and Plan:   Patient is here for their 11th presurgery visit (last seen 3/13/2020) for sleeve via telemedicine, up 7 lbs. The patient's current Body mass index is 73.29 kg/m². (8/31/20). She is struggling making dietary and behavior modifications. She needs to avoid high fat/sugar foods and focus more on lean protein for all her meal/snacks. She talked with the registered dietitian for continued follow up. I agree with recommendations and plan. She is exercising with a little walking and uses a foot pedlar under her table.  Encouraged continued physical activity as tolerated. Patient received instruction that it is recommended to avoid pregnancy following bariatric surgery for at least 2 years to allow them to have stable weight loss and to help avoid increased risk of vitamin deficiencies and malnutrition. The patient was encouraged to discuss possible contraceptive methods with their PCP or OBGYN. Discussed preop work up which still needs labs (reviewed recent labs from cardiology.), EGD, psych evaluation, cardiac clearance, BMI <65 (#1 Priority right now) and sleep clearance (needs updated compliance. Was not compliant on 5/15/2020; 56.7%). We will see her back in 1 month for continued follow up or through telemedicine. A total of 15 minutes was spent conversing with the patient and over half of that time was spent counseling the patient on proper dietary behaviors, exercise and preoperative work-up. An electronic signature was used to authenticate this note. Pursuant to the emergency declaration under the [facility], [facility] waiver authority and the [employer] Act, this Virtual  Visit was conducted, with patient's consent, to reduce the patient's risk of exposure to COVID-19 and provide continuity of care for an established patient. Services were provided through a video synchronous discussion virtually to substitute for in-person clinic visit. Obesity, as a disease, is considered high risk to a patients overall health and should therefore be considered a high risk disease state. Now with Covid-19 pandemic, CDC and health authorities do classify obese patients as vulnerable and high risk as well.

## 2020-08-30 NOTE — PATIENT INSTRUCTIONS
Goals in preparing for bariatric surgery  You should be giving up all beverages that have carbonation, sugar, and caffeine (Refer to the approved liquids list provided at initial visit).  You should be drinking 64 ounces of low calorie (5 calories or less per serving) fluids per day. Suggestions include:  o Water (you may add fresh lemon or lime)  o Crystal Light  o Edward Liquid Water Enhancer  o Propel Zero  o Powerade Zero/Gatorade Zero  o Isopure  o [id number]  o SOBE Lifewater Zero  o Vitamin Water Zero  o Sugar Free Gamal-Aid  You should be eating 4-6 times per day.  Three small meals plus 1-2 snacks per day is your goal. This balances your calories and nutrients evenly throughout the day and helps to boost your metabolism. Refer to the snack list provided at your initial visit. Aim for a protein at every snack, plus a fruit, vegetable or starch. You should be eating protein at every meal and snack.  Protein is typically found in animal sources, i.e. chicken, lean beef, lean pork, fish, seafood and eggs. It is also found in low-fat dairy sources such as skim or 1% milk, low-fat yogurt, low-fat cheese, and low-fat cottage cheese. Plant based sources of protein include peanut butter, beans, and soy. You should be utilizing the 9-inch plate method.  Eating on a smaller plate will help you control portion size, but what you put on your plate counts also. Make ¼ of your plate lean protein, ¼ carbohydrate (fruit, grain or starchy vegetables) and ½ the plate non-starchy vegetables. You should eliminate caffeine.  Caffeine is dehydrating. After surgery, it's very important to stay hydrated. Giving up caffeine before surgery will help you focus on the changes necessary to be successful after surgery. There are many decaffeinated coffee and tea products available in grocery stores. You should be reducing added fat and sugar in your diet.    Frying foods adds too much fat and calories, but you could use an air

## 2020-08-31 ENCOUNTER — TELEMEDICINE (OUTPATIENT)
Dept: BARIATRICS/WEIGHT MGMT | Age: 50
End: 2020-08-31
Payer: COMMERCIAL

## 2020-08-31 VITALS — BODY MASS INDEX: 50.02 KG/M2 | HEIGHT: 64 IN | WEIGHT: 293 LBS

## 2020-08-31 PROCEDURE — 99213 OFFICE O/P EST LOW 20 MIN: CPT | Performed by: NURSE PRACTITIONER

## 2020-08-31 NOTE — PROGRESS NOTES
Malena Woodson gained 7 lbs over past 5 months. Due to the COVID-19 restrictions on close contact interactions the patient's visit was conducted via telephone in angela of a face to face visit. The patient is here through telemedicine for their 11th MWM/pre surg visit. Breakfast: eggs with 2 slices of bread sometimes with regular sausage     Snack: nothing     Lunch: leftovers     Snack: nothing     Dinner: hamburger with bun with mitchell/cheese and mashed potatoes     Snack: juicer - fruits (apples/oranges/berries) made by friend (16 oz) - no added juices OR movie theatre popcorn OR candy bar OR nothing  OR PB crackers     Is pt consuming smaller portions? Still need to work on this     Is pt consuming at least 64 oz of fluids per day? Yes    Is pt consuming carbonated, caffeinated, or sugary beverages? Drinking water    Has pt sampled Unjury and/or Nectar protein? Tried some but did not like flavors     Exercise: walking a little and using foot pedal qod. Active for 10 minutes.      Plan/Recommendations:   Eat 4 x day   Avoid high fa/sugar foods - sausage/movie theatre popcorn/candy   Continue with exercise as tolerated   Decrease CHO and focus on increasing non-starchy veggies   Try more protein powder   Focus on protein based snacks  Decrease fruit drink to 8 oz and include protein     Handouts: none    Андрей Bishop

## 2020-09-16 ENCOUNTER — TELEMEDICINE (OUTPATIENT)
Dept: BARIATRICS/WEIGHT MGMT | Age: 50
End: 2020-09-16
Payer: COMMERCIAL

## 2020-09-16 PROCEDURE — 90791 PSYCH DIAGNOSTIC EVALUATION: CPT | Performed by: SOCIAL WORKER

## 2020-09-16 NOTE — PROGRESS NOTES
as a way to check in with self on hunger cues  Pack lunch as if she is going into work  Look over frozen meals, protein bar and mindful eating handouts             Consent:  She and/or health care decision maker is aware that that she may receive a bill for this telephone service, depending on her insurance coverage, and has provided verbal consent to proceed: Yes      I affirm this is a Patient Initiated Episode with an Established Patient who has not had a related appointment within my department in the past 7 days or scheduled within the next 24 hours.     Total Time: minutes: 21-30 minutes    Note: not billable if this call serves to triage the patient into an appointment for the relevant concern      SELWYN Arteaga

## 2020-09-24 RX ORDER — SPIRONOLACTONE 25 MG/1
25 TABLET ORAL DAILY
Qty: 90 TABLET | Refills: 1 | Status: SHIPPED | OUTPATIENT
Start: 2020-09-24 | End: 2020-11-09 | Stop reason: SDUPTHER

## 2020-10-11 PROBLEM — E78.2 MIXED HYPERLIPIDEMIA: Status: ACTIVE | Noted: 2020-10-11

## 2020-10-11 ASSESSMENT — ENCOUNTER SYMPTOMS
EYES NEGATIVE: 1
GASTROINTESTINAL NEGATIVE: 1
ALLERGIC/IMMUNOLOGIC NEGATIVE: 1
RESPIRATORY NEGATIVE: 1
COUGH: 0
SHORTNESS OF BREATH: 0

## 2020-10-11 NOTE — PATIENT INSTRUCTIONS
Goals in preparing for bariatric surgery  You should be giving up all beverages that have carbonation, sugar, and caffeine (Refer to the approved liquids list provided at initial visit).  You should be drinking 64 ounces of low calorie (5 calories or less per serving) fluids per day. Suggestions include:  o Water (you may add fresh lemon or lime)  o Crystal Light  o Edward Liquid Water Enhancer  o Propel Zero  o Powerade Zero/Gatorade Zero  o Isopure  o [id number]  o SOBE Lifewater Zero  o Vitamin Water Zero  o Sugar Free Gamal-Aid  You should be eating 4-6 times per day.  Three small meals plus 1-2 snacks per day is your goal. This balances your calories and nutrients evenly throughout the day and helps to boost your metabolism. Refer to the snack list provided at your initial visit. Aim for a protein at every snack, plus a fruit, vegetable or starch. You should be eating protein at every meal and snack.  Protein is typically found in animal sources, i.e. chicken, lean beef, lean pork, fish, seafood and eggs. It is also found in low-fat dairy sources such as skim or 1% milk, low-fat yogurt, low-fat cheese, and low-fat cottage cheese. Plant based sources of protein include peanut butter, beans, and soy. You should be utilizing the 9-inch plate method.  Eating on a smaller plate will help you control portion size, but what you put on your plate counts also. Make ¼ of your plate lean protein, ¼ carbohydrate (fruit, grain or starchy vegetables) and ½ the plate non-starchy vegetables. You should eliminate caffeine.  Caffeine is dehydrating. After surgery, it's very important to stay hydrated. Giving up caffeine before surgery will help you focus on the changes necessary to be successful after surgery. There are many decaffeinated coffee and tea products available in grocery stores. You should be reducing added fat and sugar in your diet.    Frying foods adds too much fat and calories, but you could use an air fryer as it requires significantly less oil. Baking, broiling, or grilling meats add flavor without unhealthy fats. Using cooking oil spray and spray butter products are also healthy options that will aid in your weight loss. Foods high in added sugars are often also high in calories and low in nutrients. Eating habits after surgery need to be a long-term change. Eating habits are often so ingrained that it can be difficult to change. It is important to practice new eating habits prior to surgery to mentally prepare yourself for the challenge ahead. Also, remember that overall health, age, and genetics make each person's weight loss progress different. Do not compare your progress (pre- or post-operatively), the amount you eat, or your exercise to other patients. In addition, it is the responsibility of the patient to schedule and follow up on labs and tests completed during the pre-surgical period. Results will be reviewed at each visit. **IT IS IMPORTANT TO KNOW THAT YOU MUST COMPLETE ALL REQUIRED DIETARY CHANGES, TESTS, CLEARANCES AND ACTIVITIES BEFORE A SURGERY DATE CAN BE GIVEN. IF YOU HAVE NOT MET ANY OF THESE REQUIREMENTS THEN YOU WILL NOT BE GIVEN A SURGERY DATE UNTIL THEY HAVE BEEN MET TO OUR SATISFACTION. THIS IS NOT ONLY DONE TO HELP YOU BE SUCCESSFUL AFTER SURGERY, BUT TO BE SAFE AS WELL.**    Patient received dietary handouts and education.

## 2020-10-11 NOTE — PROGRESS NOTES
[facility]) Physicians   [employer]    10/12/2020    TELEHEALTH EVALUATION -- Audio/Visual (During YQAML-19 public health emergency)    Subjective:      Patient ID: Bhumi Espinosa is a 44 y.o. female has requested an audio/video evaluation. HPI    Due to the COVID-19 restrictions on close contact interactions the patient's monthly presurgical visit was conducted via audio/video in angela of a face to face visit. Patient has consented to have this visit conducted via audio/video and I am conducting it from the office. The patient is here through telemedicine for their bariatric surgery presurgical visit for future weight loss. She has made several attempts at weight loss in the past without success and now wishes to pursue bariatric surgery. She is working to change her dietary behaviors and lose weight to improve comorbid conditions such as hypertension, diabetes mellitus, hyperlipidemia and obstructive sleep apnea. Bhmui Espinosa is a 44 y.o. female with Body mass index is 73.29 kg/m². Past Medical History:   Diagnosis Date    Acute on chronic combined systolic and diastolic CHF (congestive heart failure) ([address] 1/26/2018    Asthma     [facility]     Diabetes mellitus ([facility]     Heart murmur     Hypertension     Obstructive sleep apnea (adult) (pediatric) 1/26/2018    Sleep apnea     Urinary incontinence      No past surgical history on file.   Family History   Problem Relation Age of Onset    High Blood Pressure Mother     Other Mother         cardiac murmur    Arthritis Mother     Hypertension Mother     High Blood Pressure Father     Hypertension Father     Stroke Father     Arthritis Father     High Blood Pressure Sister     Diabetes Sister         prediabetic    Hypertension Sister     Hypertension Maternal Uncle     Cancer Maternal Uncle     Cancer Maternal Grandmother      Social History     Tobacco Use    Smoking status: Never Smoker    Smokeless Hematological: Negative. Psychiatric/Behavioral: Negative. PHYSICAL EXAMINATION:    Constitutional: [x] Appears well-developed and well-nourished [x] No apparent distress      [] Abnormal-   Mental status  [x] Alert and awake  [x] Oriented to person/place/time [x]Able to follow commands      Eyes:  EOM    [x]  Normal  [] Abnormal-  Sclera  [x]  Normal  [] Abnormal -         Discharge [x]  None visible  [] Abnormal -    HENT:   [x] Normocephalic, atraumatic. [] Abnormal     Neck: [x] No visualized mass     Pulmonary/Chest: [x] Respiratory effort normal.  [x] No visualized signs of difficulty breathing or respiratory distress        [] Abnormal-      Musculoskeletal:   [] Normal gait with no signs of ataxia         [x] Normal range of motion of neck        [] Abnormal-     Neurological:        [x] No Facial Asymmetry (Cranial nerve 7 motor function) (limited exam to video visit)          [x] No gaze palsy        [] Abnormal-         Skin:        [x] No significant exanthematous lesions or discoloration noted on facial skin         [] Abnormal-            Psychiatric:       [x] Normal Affect [x] No Hallucinations        [] Abnormal-     Other pertinent observable physical exam findings-     Due to this being a TeleHealth encounter, evaluation of the following organ systems is limited: Vitals/Constitutional/EENT/Resp/CV/GI//MS/Neuro/Skin/Heme-Lymph-Imm. Assessment and Plan:   Patient is here for their 12th presurgery visit for sleeve via telemedicine, no change in weight today. The patient's current Body mass index is 73.29 kg/m². (10/12/20). She is struggling with making dietary and behavior modifications. She is eating throughout the day, but her food choices are still higher in fat/sugar and she needs to reduce her portions significantly. She talked with the registered dietitian for continued follow up. I agree with recommendations and plan. She is exercising with her foot peddler two times per week. Encouraged physical activity as tolerated. Patient received instruction that it is recommended to avoid pregnancy following bariatric surgery for at least 2 years to allow them to have stable weight loss and to help avoid increased risk of vitamin deficiencies and malnutrition. The patient was encouraged to discuss possible contraceptive methods with their PCP or OBGYN. Discussed preop work up which still needs labs, EGD, psych evaluation, cardiac clearance, BMI <65 (#1 Priority right now) and sleep clearance (needs updated compliance. Was not compliant on 5/15/2020; 56.7%). We will see her back in 6 weeks for continued follow up or through telemedicine. A total of 15 minutes was spent conversing with the patient and over half of that time was spent counseling the patient on proper dietary behaviors, exercise and preoperative work-up. An electronic signature was used to authenticate this note. Pursuant to the emergency declaration under the [facility], On license of [facility] waiver authority and the [employer] Act, this Virtual  Visit was conducted, with patient's consent, to reduce the patient's risk of exposure to COVID-19 and provide continuity of care for an established patient. Services were provided through a video synchronous discussion virtually to substitute for in-person clinic visit. Obesity, as a disease, is considered high risk to a patients overall health and should therefore be considered a high risk disease state. Now with Covid-19 pandemic, CDC and health authorities do classify obese patients as vulnerable and high risk as well.

## 2020-10-12 ENCOUNTER — VIRTUAL VISIT (OUTPATIENT)
Dept: BARIATRICS/WEIGHT MGMT | Age: 50
End: 2020-10-12
Payer: COMMERCIAL

## 2020-10-12 VITALS — BODY MASS INDEX: 50.02 KG/M2 | HEIGHT: 64 IN | WEIGHT: 293 LBS

## 2020-10-12 PROCEDURE — 99213 OFFICE O/P EST LOW 20 MIN: CPT | Performed by: NURSE PRACTITIONER

## 2020-10-12 ASSESSMENT — ENCOUNTER SYMPTOMS: BACK PAIN: 1

## 2020-10-12 NOTE — PROGRESS NOTES
Malena Woodson stable / unchanged over past 6 weeks. Pt works with behaviorist Diandra. Due to the COVID-19 restrictions on close contact interactions the patient's visit was conducted via telephone in angela of a face to face visit. The patient is here through telemedicine for their pre surg visit. Is pt eating at least 4 times everyday? Yes     Breakfast: eggs and coley OR bowl of cereal (cheerios/raisin bran/frosted flakes) with unsweetened almond milk    Snack: nothing - feels hungry OR might have 2 HB eggs     Lunch: tuna fish with crackers OR grilled chicken and salad     Snack: nothing     Dinner: Baked chicken wings/thighs/salmon with broccoli/mashed potatoes OR crab linguine with mo sauce     Snack: P3 pack - ham/cheese/almonds     Is pt eating a lean protein source with all meals and snacks? Yes    Has pt decreased their portions using the plate method? Still working on this    Is pt choosing low fat/sugar free options? No     Is pt drinking at least 64 oz of clear liquids everyday? Yes    Has pt stopped drinking carbonation, caffeinated, and sugar sweetened beverages? Drinking water, gingerale     Has pt sampled Unjury and/or Nectar protein? Tried some but did not like flavors     Participating in intentional exercise?  peddler 2 x week for 5 minutes     Plan/Recommendations:   Avoid high fat foods - coley/mo  Avoid high sugar foods - frosted flakes   Decrease portions   Include protein based snack in the morning   Avoid carbonation - gingerale   Try additional protein powders  Continue with exercising     Handouts: portion control, presurgical diet eating guide, 9 inch plate    Leobardo Monroe

## 2020-10-28 ENCOUNTER — TELEMEDICINE (OUTPATIENT)
Dept: BARIATRICS/WEIGHT MGMT | Age: 50
End: 2020-10-28
Payer: COMMERCIAL

## 2020-10-28 PROCEDURE — 96158 HLTH BHV IVNTJ INDIV 1ST 30: CPT | Performed by: SOCIAL WORKER

## 2020-10-28 NOTE — PROGRESS NOTES
Bhumi Espinosa is a 44 y.o. female evaluated via video virtual visit on 10/28/2020. Bhumi Espinosa presents today with diagnosis of individual counseling encounter related to behavioral health concerns. Patient presented as open and pleasant throughout virtual appointment. Consent:  She and/or health care decision maker is aware that that she may receive a bill for this telephone service, depending on her insurance coverage, and has provided verbal consent to proceed: Yes      Patient's Current Goals:  Goals      Make Healther Lifestyle choices      Eat breakfast before son leaves to get a coffee at fast food restaurant  Set timer/alarm on phone as a way to check in with self on hunger cues  Pack lunch as if she is going into work  Look over frozen meals, protein bar and mindful eating handouts             Updates since last visit:   Worries that she gained weight after going out of town for her birthday and \"went off the rails a bit\"    Patient concerns or questions  Water intake (cold vs room temp)  Not eating on a regular schedule    Behaviorist overview:   Try setting alarms to remind self to eat and get on a good schedule. Encouraged patient to drink water at whatever temp she enjoys the most and helps her increase the water intake easiest.       I affirm this is a Patient Initiated Episode with an Established Patient who has not had a related appointment within my department in the past 7 days or scheduled within the next 24 hours.     Total Time: minutes: 21-30 minutes    Note: not billable if this call serves to triage the patient into an appointment for the relevant concern      SELWYN Rose

## 2020-11-09 ENCOUNTER — VIRTUAL VISIT (OUTPATIENT)
Dept: CARDIOLOGY CLINIC | Age: 50
End: 2020-11-09
Payer: COMMERCIAL

## 2020-11-09 PROCEDURE — 99214 OFFICE O/P EST MOD 30 MIN: CPT | Performed by: INTERNAL MEDICINE

## 2020-11-09 RX ORDER — ATORVASTATIN CALCIUM 40 MG/1
40 TABLET, FILM COATED ORAL DAILY
Qty: 90 TABLET | Refills: 3 | Status: SHIPPED | OUTPATIENT
Start: 2020-11-09 | End: 2021-08-02

## 2020-11-09 RX ORDER — SPIRONOLACTONE 25 MG/1
25 TABLET ORAL DAILY
Qty: 90 TABLET | Refills: 3 | Status: SHIPPED | OUTPATIENT
Start: 2020-11-09 | End: 2021-11-15

## 2020-11-09 RX ORDER — CARVEDILOL 25 MG/1
25 TABLET ORAL 2 TIMES DAILY
Qty: 180 TABLET | Refills: 3 | Status: SHIPPED | OUTPATIENT
Start: 2020-11-09 | End: 2021-11-15

## 2020-11-09 RX ORDER — IRBESARTAN 150 MG/1
75 TABLET ORAL DAILY
Qty: 45 TABLET | Refills: 3 | Status: SHIPPED | OUTPATIENT
Start: 2020-11-09 | End: 2021-07-13

## 2020-11-09 RX ORDER — FUROSEMIDE 40 MG/1
TABLET ORAL
Qty: 360 TABLET | Refills: 3 | Status: SHIPPED | OUTPATIENT
Start: 2020-11-09 | End: 2022-02-25

## 2020-11-09 NOTE — PATIENT INSTRUCTIONS
Plan:  1. Labs soon FASTING. Patient will get at [facility]. 2.  Continue same medications. Refill sent in.   3.  See Dr. Tiera Padgett in 6 months with an Echo. Office will call you to schedule.

## 2020-11-09 NOTE — PROGRESS NOTES
[facility]  Advanced CHF/Pulmonary Hypertension   Cardiac Evaluation      Mari Valentine  YOB: 1970    Date of Visit:  20      Chief Complaint   Patient presents with    Congestive Heart Failure        History of Present Illness:  Mari Valentine is a 44 y.o. female who presents from referral from Afia Angel NP for consultation and management of combined diastolic and systolic heart failure in 2018. She has a past medical history of HTN, DM, TRUE on Bipap, asthma and morbid obesity. She is following with [facility]) Weight loss for possible bariatric surgery. Mari Valentine is a 44 y.o. female evaluated via Video Visit on 2020. Consent:  She and/or health care decision maker is aware that that she may receive a bill for this telephone service, depending on her insurance coverage, and has provided verbal consent to proceed: Yes      Documentation:  I communicated with the patient and/or health care decision maker about medications, labs and plan of care. Details of this discussion including any medical advice provided: see below    I affirm  this is a Patient Initiated Episode with an Established Patient who has not had a related appointment within my department in the past 7 days or scheduled within the next 24 hours. Total Time:  25 minutes    Note: not billable if this call serves to triage the patient into an appointment for the relevant concern    2020    TELEHEALTH EVALUATION -- Audio/Visual (During [facility] public health emergency)      Mari Valentine (:  1970) has requested an audio/video evaluation for the following concern(s):    Today she states she feels ok. 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 She states she is doing the unemployment claims and they are extremely busy. She is wearing her Bipap at night. She continues to work from home and states she loves it.   She has been out of breath when she walks a lot. She recovers with rest.  She states she bought compression socks and is wearing then during the day which is helping her swelling. She continues to follow up with the bariatric program.  She states she needs to get down to 380 pounds before surgery can take place and can't seem to lose weight and keep it off. She is getting labs this week Wednesday. Denies chest pain, dizziness, syncope and palpitations. NYHA Class 2-3      Review of Systems    Prior to Visit Medications    Medication Sig Taking? Authorizing Provider   spironolactone (ALDACTONE) 25 MG tablet Take 1 tablet by mouth daily Yes Martin Dominguez MD   atorvastatin (LIPITOR) 40 MG tablet Take 1 tablet by mouth daily Yes Martin Dominguez MD   furosemide (LASIX) 40 MG tablet TAKE 2 TABLETS BY MOUTH TWICE DAILY Yes Martin Dominguez MD   irbesartan (AVAPRO) 150 MG tablet Take 0.5 tablets by mouth daily Yes Martin Dominguez MD   carvedilol (COREG) 25 MG tablet Take 1 tablet by mouth 2 times daily Yes Martin Dominguez MD   aspirin 81 MG chewable tablet Chew and swallow 1 tablet by mouth daily  Yes Martin Dominguez MD   glyBURIDE (DIABETA) 2.5 MG tablet TK 1 T PO D Yes Historical Provider, MD   Cholecalciferol (VITAMIN D3) 25 MCG (1000 UT) TABS Take 1 tablet by mouth daily Yes Lea Case APRN - CNS   Alpha-D-Galactosidase (BEANO MELTAWAYS) TABS Take 1 tablet by mouth Yes Historical Provider, MD   ketoconazole (NIZORAL) 2 % cream Apply topically daily as needed Apply topically daily.   Yes Historical Provider, MD   famotidine (PEPCID) 20 MG tablet Take 20 mg by mouth 2 times daily as needed   Historical Provider, MD   mometasone-formoterol (DULERA) 100-5 MCG/ACT inhaler Inhale 2 puffs into the lungs 2 times daily  Patient not taking: Reported on 5/8/2020  Mar Bernal MD   albuterol sulfate  (90 Base) MCG/ACT inhaler Inhale 2 puffs into the lungs  Historical Provider, MD       Social History     Tobacco Use    Smoking status: Never Smoker    Smokeless tobacco: Never Used   Substance Use Topics    Alcohol use: No    Drug use: No      PHYSICAL EXAMINATION:    Vital Signs: (As obtained by patient/caregiver or practitioner observation)    Blood pressure- NA  Heart rate- NA Respiratory rate-    Temperature-  Pulse oximetry-     Constitutional: [x] Appears well-developed and well-nourished [x] No apparent distress      [] Abnormal-   Mental status  [x] Alert and awake  [x] Oriented to person/place/time [x]Able to follow commands        HENT:   [x] Normocephalic, atraumatic. [] Abnormal   [] Mouth/Throat: Mucous membranes are moist.       Neck: [x] No visualized mass     Pulmonary/Chest: [x] Respiratory effort normal.  [x] No visualized signs of difficulty breathing or respiratory distress        [] Abnormal-       Neurological:        [x] No Facial Asymmetry (Cranial nerve 7 motor function) (limited exam to video visit)          [] No gaze palsy        [] Abnormal-               Psychiatric:       [x] Normal Affect [x] No Hallucinations        [] Abnormal-      Other pertinent observable physical exam findings-  None. Bruna Kay is a 44 y.o. female being evaluated by a Virtual Visit (video visit) encounter to address concerns as mentioned above. A caregiver was present when appropriate. Due to this being a TeleHealth encounter (During SDE-21 public health emergency), evaluation of the following organ systems was limited: Vitals/Constitutional/EENT/Resp/CV/GI//MS/Neuro/Skin/Heme-Lymph-Imm. Pursuant to the emergency declaration under the [address] [place] [address] authority and the [employer] Act, this Virtual Visit was conducted with patient's (and/or legal guardian's) consent, to reduce the patient's risk of exposure to COVID-19 and provide necessary medical care.   The patient (and/or legal guardian) has also been advised to contact this office for worsening conditions or problems, and seek emergency medical treatment and/or call 911 if deemed necessary. Services were provided through a video synchronous discussion virtually to substitute for in-person clinic visit. Patient and provider were located at their individual homes. An electronic signature was used to authenticate this note. See Below      Allergies   Allergen Reactions    Levofloxacin Other (See Comments)     headache     Current Outpatient Medications   Medication Sig Dispense Refill    spironolactone (ALDACTONE) 25 MG tablet Take 1 tablet by mouth daily 90 tablet 3    atorvastatin (LIPITOR) 40 MG tablet Take 1 tablet by mouth daily 90 tablet 3    furosemide (LASIX) 40 MG tablet TAKE 2 TABLETS BY MOUTH TWICE DAILY 360 tablet 3    irbesartan (AVAPRO) 150 MG tablet Take 0.5 tablets by mouth daily 45 tablet 3    carvedilol (COREG) 25 MG tablet Take 1 tablet by mouth 2 times daily 180 tablet 3    aspirin 81 MG chewable tablet Chew and swallow 1 tablet by mouth daily  90 tablet 2    glyBURIDE (DIABETA) 2.5 MG tablet TK 1 T PO D  5    Cholecalciferol (VITAMIN D3) 25 MCG (1000 UT) TABS Take 1 tablet by mouth daily 30 tablet 5    Alpha-D-Galactosidase (BEANO MELTAWAYS) TABS Take 1 tablet by mouth      ketoconazole (NIZORAL) 2 % cream Apply topically daily as needed Apply topically daily.  famotidine (PEPCID) 20 MG tablet Take 20 mg by mouth 2 times daily as needed   0    mometasone-formoterol (DULERA) 100-5 MCG/ACT inhaler Inhale 2 puffs into the lungs 2 times daily (Patient not taking: Reported on 5/8/2020) 1 Inhaler 0    albuterol sulfate  (90 Base) MCG/ACT inhaler Inhale 2 puffs into the lungs       No current facility-administered medications for this visit.         Past Medical History:   Diagnosis Date    Acute on chronic combined systolic and diastolic CHF (congestive heart failure) ([facility] [address] 1/26/2018    Asthma     Cardiomyopathy ([facility]  Diabetes mellitus ([facility] [address] [facility]     Heart murmur     Hypertension     Obstructive sleep apnea (adult) (pediatric) 1/26/2018    Sleep apnea     Urinary incontinence      History reviewed. No pertinent surgical history.   Family History   Problem Relation Age of Onset    High Blood Pressure Mother     Other Mother         cardiac murmur    Arthritis Mother     Hypertension Mother     High Blood Pressure Father     Hypertension Father     Stroke Father     Arthritis Father     High Blood Pressure Sister     Diabetes Sister         prediabetic    Hypertension Sister     Hypertension Maternal Uncle     Cancer Maternal Uncle     Cancer Maternal Grandmother      Social History     Socioeconomic History    Marital status: Single     Spouse name: Not on file    Number of children: 3    Years of education: Not on file    Highest education level: Not on file   Occupational History    Not on file   Social Needs    Financial resource strain: Not on file    Food insecurity     Worry: Not on file     Inability: Not on file   [employer] needs     Medical: Not on file     Non-medical: Not on file   Tobacco Use    Smoking status: Never Smoker    Smokeless tobacco: Never Used   Substance and Sexual Activity    Alcohol use: No    Drug use: No    Sexual activity: Never   Lifestyle    Physical activity     Days per week: Not on file     Minutes per session: Not on file    Stress: Not on file   Relationships    Social connections     Talks on phone: Not on file     Gets together: Not on file     Attends Synagogue service: Not on file     Active member of club or organization: Not on file     Attends meetings of clubs or organizations: Not on file     Relationship status: Not on file    Intimate partner violence     Fear of current or ex partner: Not on file     Emotionally abused: Not on file     Physically abused: Not on file     Forced sexual activity: Not on file   Other Topics Concern    Not on tricuspid regurgitation with RVSP estimated at 48   mmHg. Assessment:    1. Chronic combined systolic (congestive) and diastolic (congestive) heart failure ([facility] [address]    2. Essential hypertension    3. TRUE (obstructive sleep apnea)    4. Morbid obesity ([facility] [address] [facility]    5. SOB (shortness of breath)    6. Hyperlipidemia, unspecified hyperlipidemia type    7. Vitamin D deficiency        1. Chronic systolic heart failure (HCC) Appears compensated.   -Continue ARB, Lasix, spironolactone, and Coreg.  -Check heart failure labs soon.   -Continue to work on weight loss. 2. Essential hypertension:  There were no vitals taken for this visit.    -Pt did not take vitals for this visit. 3. Morbid obesity ([facility] [address]): Working with [employer] loss and making dietary changes.   -Has not been able to lose weight. 4. TRUE (obstructive sleep apnea):  Wearing Bipap. Follows with Dr. Mirian Brothers. Plan:  1. Labs soon FASTING. Patient will get at [place]. 2.  Continue same medications. Refill sent in 90 days with refills. 3.  See Dr. Karin Cabello in 6 months with an Echo. Office will call you to schedule. Scribe's attestation: This note was scribed in the presence of Randall Johnson M.D. by Dilma Worley RN     The scribe's documentation has been prepared under my direction and personally reviewed by me in its entirety. I confirm that the note above accurately reflects all work, treatment, procedures, and medical decision making performed by me. QUALITY MEASURES  1. Tobacco Cessation Counseling: NA  2. Retake of BP if >140/90:   NA  3. Documentation to PCP/referring for new patient:  Sent to PCP at close of office visit  4. CAD patient on anti-platelet: NA  5. CAD patient on STATIN therapy:  NA  6. Patient with CHF and aFib on anticoagulation:  NA         I appreciate the opportunity of cooperating in the care of this patient.     Randall Johnson M.D., [facility]

## 2020-12-02 ENCOUNTER — TELEMEDICINE (OUTPATIENT)
Dept: BARIATRICS/WEIGHT MGMT | Age: 50
End: 2020-12-02
Payer: COMMERCIAL

## 2020-12-02 PROCEDURE — 96156 HLTH BHV ASSMT/REASSESSMENT: CPT | Performed by: SOCIAL WORKER

## 2020-12-31 NOTE — PROGRESS NOTES
Justin Farrell is a 44 y.o. female evaluated via video virtual visit on 12/2/2020. Justin Farrell presents today with diagnosis of individual counseling encounter related to behavioral health concerns. Patient presented as open throughout virtual appointment. Consent:  She and/or health care decision maker is aware that that she may receive a bill for this telephone service, depending on her insurance coverage, and has provided verbal consent to proceed: Yes      Patient's Current Goals:  Goals      Make Healther Lifestyle choices      Eat breakfast before son leaves to get a coffee at fast food restaurant  Set timer/alarm on phone as a way to check in with self on hunger cues  Pack lunch as if she is going into work  Look over frozen meals, protein bar and mindful eating handouts             Updates since last visit:   States she has been very stressed  Has had some changes with her job  Joined a bariatric group on [facility] an accountability pact with her son to give up pop  Tried unjury-liked it    Patient concerns or questions  Stress eating    Behaviorist overview: Follow up in 4-6 weeks      I affirm this is a Patient Initiated Episode with an Established Patient who has not had a related appointment within my department in the past 7 days or scheduled within the next 24 hours. 23 minutes was spent in virtual visit counseling with patient. Follow up care has been discussed with patient in relation to goals and concerns addressed today. Note: not billable if this call serves to triage the patient into an appointment for the relevant concern    This note will not be viewable in [employer] for the following reason(s). This is a Psychotherapy Note.         SELWYN Leon

## 2021-07-13 RX ORDER — IRBESARTAN 150 MG/1
TABLET ORAL
Qty: 45 TABLET | Refills: 3 | Status: SHIPPED | OUTPATIENT
Start: 2021-07-13 | End: 2021-09-10 | Stop reason: SDUPTHER

## 2021-07-22 RX ORDER — ASPIRIN 81 MG/1
TABLET, CHEWABLE ORAL
Qty: 90 TABLET | Refills: 2 | Status: SHIPPED | OUTPATIENT
Start: 2021-07-22 | End: 2022-05-09

## 2021-07-22 NOTE — TELEPHONE ENCOUNTER
Received refill request for Asprin from [facility]. Last ov: 2020 VV with ANDREW        Last EK2019  Last Refill:2020    Next appointment:  No future appointments at this time.

## 2021-09-02 NOTE — PROGRESS NOTES
[address]  Advanced CHF/Pulmonary Hypertension   Cardiac Evaluation      Annetta Mathur  YOB: 1970    Date of Visit:  9/10/21    Chief Complaint   Patient presents with    Congestive Heart Failure    Shortness of Breath    Cough    Dizziness      History of Present Illness:  Annetta Mathur is a 44 y.o. female with a past medical history of HTN, DM, TRUE on Bipap, asthma, and morbid obesity. She is following with [facility] Weight loss for possible bariatric surgery. Today, she is here for regular follow up. She reports that when she stands up and starts to walk, she sometimes feels light-headed and sways from side to side. It does not happen every time she stands. No syncope. She denies exertional chest pain, HILL/PND, palpitations. She states she does not have a lot of LE edema but her right thigh has lymphedema that makes it difficult for her to walk at times; this is emotionally upsetting for her. She has struggled with losing weight. She has received both Pfizer Covid vaccines late June into July thru [facility]. She is asking for paperwork to be filled out stating she must work from home. She has trouble getting around due to shortness of breath and her weight gain.     Allergies   Allergen Reactions    Levofloxacin Other (See Comments)     headache     Current Outpatient Medications   Medication Sig Dispense Refill    ferrous sulfate (IRON 325) 325 (65 Fe) MG tablet Take 325 mg by mouth 2 times daily      atorvastatin (LIPITOR) 40 MG tablet TAKE 1 TABLET BY MOUTH DAILY 30 tablet 0    aspirin 81 MG chewable tablet Chew and swallow 1 tablet by mouth daily  90 tablet 2    irbesartan (AVAPRO) 150 MG tablet TAKE 1/2 TABLET BY MOUTH EVERY NIGHT 45 tablet 3    spironolactone (ALDACTONE) 25 MG tablet Take 1 tablet by mouth daily 90 tablet 3    furosemide (LASIX) 40 MG tablet TAKE 2 TABLETS BY MOUTH TWICE DAILY 360 tablet 3    carvedilol (COREG) 25 MG tablet Take 1 tablet by mouth 2 times daily 180 tablet 3    glyBURIDE (DIABETA) 2.5 MG tablet TK 1 T PO D  5    albuterol sulfate  (90 Base) MCG/ACT inhaler Inhale 2 puffs into the lungs      ketoconazole (NIZORAL) 2 % cream Apply topically daily as needed Apply topically daily.  Alpha-D-Galactosidase (BEANO MELTAWAYS) TABS Take 1 tablet by mouth       No current facility-administered medications for this visit. Past Medical History:   Diagnosis Date    Acute on chronic combined systolic and diastolic CHF (congestive heart failure) ([facility] [address] 1/26/2018    Asthma     Cardiomyopathy [facility] [address] [facility]     Diabetes mellitus ([facility] [address]     Heart murmur     Hypertension     Obstructive sleep apnea (adult) (pediatric) 1/26/2018    Sleep apnea     Urinary incontinence      History reviewed. No pertinent surgical history.   Family History   Problem Relation Age of Onset    High Blood Pressure Mother     Other Mother         cardiac murmur    Arthritis Mother     Hypertension Mother     High Blood Pressure Father     Hypertension Father     Stroke Father     Arthritis Father     High Blood Pressure Sister     Diabetes Sister         prediabetic    Hypertension Sister     Hypertension Maternal Uncle     Cancer Maternal Uncle     Cancer Maternal Grandmother      Social History     Socioeconomic History    Marital status: Single     Spouse name: Not on file    Number of children: 3    Years of education: Not on file    Highest education level: Not on file   Occupational History    Not on file   Tobacco Use    Smoking status: Never Smoker    Smokeless tobacco: Never Used   Vaping Use    Vaping Use: Never used   Substance and Sexual Activity    Alcohol use: No    Drug use: No    Sexual activity: Never   Other Topics Concern    Not on file   Social History Narrative    Not on file     Social Determinants of Health     Financial Resource Strain:     Difficulty of Paying Living Expenses:    Food Insecurity:     Worried About [facility] in the Last Year:    [address] in the Last Year:    Transportation Needs:     Lack of Transportation (Medical):  Lack of Transportation (Non-Medical):    Physical Activity:     Days of Exercise per Week:     Minutes of Exercise per Session:    Stress:     Feeling of Stress :    Social Connections:     Frequency of Communication with Friends and Family:     Frequency of Social Gatherings with Friends and Family:     Attends Anglican Services:     Active Member of Clubs or Organizations:     Attends Club or Organization Meetings:     Marital Status:    Intimate Partner Violence:     Fear of Current or Ex-Partner:     Emotionally Abused:     Physically Abused:     Sexually Abused:        Review of Systems:   · Constitutional: there has been no unanticipated weight loss. There's been no change in energy level, sleep pattern, or activity level. · Eyes: No visual changes or diplopia. No scleral icterus. · ENT: No Headaches, hearing loss or vertigo. No mouth sores or sore throat. · Cardiovascular: Reviewed in HPI  · Respiratory: No cough or wheezing, no sputum production. No hematemesis. · Gastrointestinal: No abdominal pain, appetite loss, blood in stools. No change in bowel or bladder habits. · Genitourinary: No dysuria, trouble voiding, or hematuria. · Musculoskeletal:  No gait disturbance, weakness or joint complaints. · Integumentary: No rash or pruritis. · Neurological: No headache, diplopia, change in muscle strength, numbness or tingling. No change in gait, balance, coordination, mood, affect, memory, mentation, behavior. · Psychiatric: No anxiety, no depression. · Endocrine: No malaise, fatigue or temperature intolerance. No excessive thirst, fluid intake, or urination. No tremor. · Hematologic/Lymphatic: No abnormal bruising or bleeding, blood clots or swollen lymph nodes.   · Allergic/Immunologic: No nasal congestion or hives.    Physical Examination:    Vitals:    09/10/21 1522 09/10/21 1529   BP: 118/60    Pulse: 95 79   SpO2: 91% 96%   Weight: (!) 441 lb (200 kg)    Height: 5' 4\" (1.626 m)      Body mass index is 75.7 kg/m². Wt Readings from Last 3 Encounters:   09/10/21 (!) 441 lb (200 kg)   10/12/20 (!) 427 lb (193.7 kg)   08/31/20 (!) 427 lb (193.7 kg)     BP Readings from Last 3 Encounters:   09/10/21 118/60   05/08/20 (!) 108/44   03/13/20 117/60     Constitutional and General Appearance:   WD/WN in NAD, obese  HEENT:  NC/AT  GILBERTO  No problems with hearing  Skin:  Warm, dry  Respiratory:  · Normal excursion and expansion without use of accessory muscles  · Resp Auscultation: Normal breath sounds without dullness  Cardiovascular:  · The apical impulses not displaced  · Heart tones are crisp and normal  · Cervical veins are not engorged  · The carotid upstroke is normal in amplitude and contour without delay or bruit  · JVP less than 8 cm H2O  RRR with nl S1 and S2 without m,r,g  · Peripheral pulses are symmetrical and full  · There is no clubbing, cyanosis of the extremities. · No edema  · Femoral Arteries: 2+ and equal  · Pedal Pulses: 2+ and equal   Neck:  · No thyromegaly  Abdomen:  · No masses or tenderness  · Liver/Spleen: No Abnormalities Noted  Neurological/Psychiatric:  · Alert and oriented in all spheres  · Moves all extremities well  · Exhibits normal gait balance and coordination  · No abnormalities of mood, affect, memory, mentation, or behavior are noted    ECHO 2018  Technically limited study due to body habitus. Normal left ventricle size and wall thickness. Left ventricular function is difficult to estimate due to poor endocardial visualization but appears to be reduced and is estimated at 45-50%. Mild mitral regurgitation is present. There is mild-moderate tricuspid regurgitation with RVSP estimated at 48mmHg.     Labs were reviewed including labs from other hospital systems through [facility]. Cardiac testing was reviewed including echos, nuclear scans, cardiac catheterization, including from other hospital systems through [employer] [facility]. Assessment:    1. Chronic combined systolic (congestive) and diastolic (congestive) heart failure ([facility] [address]    2. Essential hypertension    3. TRUE (obstructive sleep apnea)    4. Morbid obesity ([facility] [address]    5. SOB (shortness of breath)    6. Mixed hyperlipidemia    7. Vitamin D deficiency       1. Chronic systolic heart failure: Stable. Appears compensated.   -Continue Lasix, spironolactone, and Coreg.  -Change irbesartan 150mg 1/2 tab qd to 75mg 1tab qd  -Continue to work on weight loss. -ProBNP> 56 (7/20/20), 85 (9/5/19)  -ECHO 2018> EF 45-50%      2. Essential hypertension:  Stable  Blood pressure 118/60, pulse 79, height 5' 4\" (1.626 m), weight (!) 441 lb (200 kg), SpO2 96%     3. Hyperlipidemia: Stable on Lipitor 40mg  11/11/20> , TG 60, HDL 48, LDL 78  7/20/20> , TG 48, HDL 47,      4. Morbid obesity:  She has worked with [employer] loss and made dietary changes.   -has struggled to lose weight but keeps trying.    -+right thigh lymphedema noted      5. TRUE (obstructive sleep apnea):  Wearing Bipap. Follows with Dr. Chace Suarez. 6. Vitamin D deficiency: Level was 16.8 from 7/20/21. Will begin Vit D supplement 50,000u every week. Plan:  1. Begin Vit D 50,000u/weekly  2. Labs reviewed. Repeat labs before next visit> CMP, BNP, CBC, lipids, Vit D  3. ECHO in the next month or so  4. RTO in 6 months   5, She dropped off paperwork stating she must work from home due to Covid risk. Time Based Itemization  A total of 40 minutes was spent on today's patient encounter.   If applicable, non-patient-facing activities:  ( x)Preparing to see the patient and reviewing records  ( ) Individual interpretation of results  ( ) Discussion or coordination of care with other health care professionals  ( x) Ordering of unique tests, medications, or procedures  ( x) Documentation within the EHR      I appreciate the opportunity of cooperating in the care of this patient. Leno Hebert M.D., [address] attestation: This note was scribed in the presence of Dr. Og Shaikh MD, by Dyana Salgado RN. The scribe's documentation has been prepared under my direction and personally reviewed by me in its entirety. I confirm that the note above accurately reflects all work, treatment, procedures, and medical decision making performed by me.

## 2021-09-10 ENCOUNTER — OFFICE VISIT (OUTPATIENT)
Dept: CARDIOLOGY CLINIC | Age: 51
End: 2021-09-10
Payer: COMMERCIAL

## 2021-09-10 VITALS
HEART RATE: 79 BPM | BODY MASS INDEX: 50.02 KG/M2 | OXYGEN SATURATION: 96 % | HEIGHT: 64 IN | DIASTOLIC BLOOD PRESSURE: 60 MMHG | WEIGHT: 293 LBS | SYSTOLIC BLOOD PRESSURE: 118 MMHG

## 2021-09-10 DIAGNOSIS — I50.42 CHRONIC COMBINED SYSTOLIC (CONGESTIVE) AND DIASTOLIC (CONGESTIVE) HEART FAILURE (HCC): Primary | ICD-10-CM

## 2021-09-10 DIAGNOSIS — E66.01 MORBID OBESITY (HCC): ICD-10-CM

## 2021-09-10 DIAGNOSIS — R06.02 SOB (SHORTNESS OF BREATH): ICD-10-CM

## 2021-09-10 DIAGNOSIS — E55.9 VITAMIN D DEFICIENCY: ICD-10-CM

## 2021-09-10 DIAGNOSIS — E78.2 MIXED HYPERLIPIDEMIA: ICD-10-CM

## 2021-09-10 DIAGNOSIS — G47.33 OSA (OBSTRUCTIVE SLEEP APNEA): ICD-10-CM

## 2021-09-10 DIAGNOSIS — I10 ESSENTIAL HYPERTENSION: ICD-10-CM

## 2021-09-10 PROCEDURE — 99215 OFFICE O/P EST HI 40 MIN: CPT | Performed by: INTERNAL MEDICINE

## 2021-09-10 RX ORDER — FERROUS SULFATE 325(65) MG
325 TABLET ORAL 2 TIMES DAILY
COMMUNITY

## 2021-09-10 RX ORDER — ERGOCALCIFEROL 1.25 MG/1
50000 CAPSULE ORAL WEEKLY
Qty: 12 CAPSULE | Refills: 1 | Status: SHIPPED | OUTPATIENT
Start: 2021-09-10 | End: 2021-09-16

## 2021-09-10 RX ORDER — IRBESARTAN 75 MG/1
75 TABLET ORAL DAILY
Qty: 90 TABLET | Refills: 3 | Status: SHIPPED | OUTPATIENT
Start: 2021-09-10

## 2021-09-10 RX ORDER — ATORVASTATIN CALCIUM 40 MG/1
40 TABLET, FILM COATED ORAL DAILY
Qty: 90 TABLET | Refills: 3 | Status: SHIPPED | OUTPATIENT
Start: 2021-09-10 | End: 2022-10-25

## 2021-09-16 RX ORDER — ERGOCALCIFEROL 1.25 MG/1
50000 CAPSULE ORAL WEEKLY
Qty: 13 CAPSULE | Refills: 1 | Status: SHIPPED | OUTPATIENT
Start: 2021-09-16 | End: 2022-03-22

## 2021-11-05 ENCOUNTER — HOSPITAL ENCOUNTER (OUTPATIENT)
Dept: WOMENS IMAGING | Age: 51
Discharge: HOME | End: 2021-11-05
Payer: COMMERCIAL

## 2021-11-05 VITALS — HEIGHT: 64 IN | WEIGHT: 293 LBS | BODY MASS INDEX: 50.02 KG/M2

## 2021-11-05 DIAGNOSIS — Z12.31 ENCOUNTER FOR SCREENING MAMMOGRAM FOR MALIGNANT NEOPLASM OF BREAST: ICD-10-CM

## 2021-11-05 PROCEDURE — 77067 SCR MAMMO BI INCL CAD: CPT

## 2021-11-15 RX ORDER — CARVEDILOL 25 MG/1
TABLET ORAL
Qty: 180 TABLET | Refills: 1 | Status: SHIPPED | OUTPATIENT
Start: 2021-11-15 | End: 2022-05-09

## 2021-11-15 RX ORDER — SPIRONOLACTONE 25 MG/1
25 TABLET ORAL DAILY
Qty: 90 TABLET | Refills: 1 | Status: ON HOLD | OUTPATIENT
Start: 2021-11-15 | End: 2022-05-06 | Stop reason: SDUPTHER

## 2022-01-07 ENCOUNTER — HOSPITAL ENCOUNTER (OUTPATIENT)
Age: 52
Discharge: HOME | End: 2022-01-07
Payer: COMMERCIAL

## 2022-01-07 ENCOUNTER — HOSPITAL ENCOUNTER (OUTPATIENT)
Dept: NON INVASIVE DIAGNOSTICS | Age: 52
Discharge: HOME | End: 2022-01-07
Payer: COMMERCIAL

## 2022-01-07 DIAGNOSIS — E55.9 VITAMIN D DEFICIENCY: ICD-10-CM

## 2022-01-07 DIAGNOSIS — I50.42 CHRONIC COMBINED SYSTOLIC (CONGESTIVE) AND DIASTOLIC (CONGESTIVE) HEART FAILURE (HCC): ICD-10-CM

## 2022-01-07 DIAGNOSIS — R06.02 SOB (SHORTNESS OF BREATH): ICD-10-CM

## 2022-01-07 DIAGNOSIS — E78.2 MIXED HYPERLIPIDEMIA: ICD-10-CM

## 2022-01-07 DIAGNOSIS — I10 ESSENTIAL HYPERTENSION: ICD-10-CM

## 2022-01-07 LAB
A/G RATIO: 1.2 (ref 1.1–2.2)
ALBUMIN SERPL-MCNC: 3.9 G/DL (ref 3.4–5)
ALP BLD-CCNC: 144 U/L (ref 40–129)
ALT SERPL-CCNC: 16 U/L (ref 10–40)
ANION GAP SERPL CALCULATED.3IONS-SCNC: 11 MMOL/L (ref 3–16)
AST SERPL-CCNC: 14 U/L (ref 15–37)
BASOPHILS ABSOLUTE: 0 K/UL (ref 0–0.2)
BASOPHILS RELATIVE PERCENT: 0.7 %
BILIRUB SERPL-MCNC: 0.3 MG/DL (ref 0–1)
BUN BLDV-MCNC: 31 MG/DL (ref 7–20)
CALCIUM SERPL-MCNC: 8.9 MG/DL (ref 8.3–10.6)
CHLORIDE BLD-SCNC: 100 MMOL/L (ref 99–110)
CHOLESTEROL, TOTAL: 134 MG/DL (ref 0–199)
CO2: 27 MMOL/L (ref 21–32)
CREAT SERPL-MCNC: 1.6 MG/DL (ref 0.6–1.1)
EOSINOPHILS ABSOLUTE: 0.2 K/UL (ref 0–0.6)
EOSINOPHILS RELATIVE PERCENT: 3.4 %
GFR AFRICAN AMERICAN: 41
GFR NON-AFRICAN AMERICAN: 34
GLUCOSE BLD-MCNC: 82 MG/DL (ref 70–99)
HCT VFR BLD CALC: 34.5 % (ref 36–48)
HDLC SERPL-MCNC: 47 MG/DL (ref 40–60)
HEMOGLOBIN: 10.8 G/DL (ref 12–16)
LDL CHOLESTEROL CALCULATED: 67 MG/DL
LV EF: 55 %
LVEF MODALITY: NORMAL
LYMPHOCYTES ABSOLUTE: 2 K/UL (ref 1–5.1)
LYMPHOCYTES RELATIVE PERCENT: 31.5 %
MCH RBC QN AUTO: 26.1 PG (ref 26–34)
MCHC RBC AUTO-ENTMCNC: 31.2 G/DL (ref 31–36)
MCV RBC AUTO: 83.8 FL (ref 80–100)
MONOCYTES ABSOLUTE: 0.4 K/UL (ref 0–1.3)
MONOCYTES RELATIVE PERCENT: 6.3 %
NEUTROPHILS ABSOLUTE: 3.6 K/UL (ref 1.7–7.7)
NEUTROPHILS RELATIVE PERCENT: 58.1 %
PDW BLD-RTO: 18.8 % (ref 12.4–15.4)
PLATELET # BLD: 234 K/UL (ref 135–450)
PMV BLD AUTO: 8.7 FL (ref 5–10.5)
POTASSIUM SERPL-SCNC: 4.6 MMOL/L (ref 3.5–5.1)
PRO-BNP: 120 PG/ML (ref 0–124)
RBC # BLD: 4.12 M/UL (ref 4–5.2)
SODIUM BLD-SCNC: 138 MMOL/L (ref 136–145)
TOTAL PROTEIN: 7.2 G/DL (ref 6.4–8.2)
TRIGL SERPL-MCNC: 102 MG/DL (ref 0–150)
VITAMIN D 25-HYDROXY: 28.1 NG/ML
VLDLC SERPL CALC-MCNC: 20 MG/DL
WBC # BLD: 6.3 K/UL (ref 4–11)

## 2022-01-07 PROCEDURE — 93306 TTE W/DOPPLER COMPLETE: CPT

## 2022-01-07 PROCEDURE — 36415 COLL VENOUS BLD VENIPUNCTURE: CPT

## 2022-01-07 PROCEDURE — 80061 LIPID PANEL: CPT

## 2022-01-07 PROCEDURE — 85025 COMPLETE CBC W/AUTO DIFF WBC: CPT

## 2022-01-07 PROCEDURE — 83880 ASSAY OF NATRIURETIC PEPTIDE: CPT

## 2022-01-07 PROCEDURE — 80053 COMPREHEN METABOLIC PANEL: CPT

## 2022-01-07 PROCEDURE — 82306 VITAMIN D 25 HYDROXY: CPT

## 2022-01-11 ENCOUNTER — TELEPHONE (OUTPATIENT)
Dept: CARDIOLOGY CLINIC | Age: 52
End: 2022-01-11

## 2022-01-11 DIAGNOSIS — I50.42 CHRONIC COMBINED SYSTOLIC (CONGESTIVE) AND DIASTOLIC (CONGESTIVE) HEART FAILURE (HCC): ICD-10-CM

## 2022-01-11 DIAGNOSIS — G47.33 OSA (OBSTRUCTIVE SLEEP APNEA): Primary | ICD-10-CM

## 2022-01-11 DIAGNOSIS — Z87.09 HISTORY OF RESPIRATORY FAILURE: ICD-10-CM

## 2022-01-11 DIAGNOSIS — I10 ESSENTIAL HYPERTENSION: ICD-10-CM

## 2022-01-11 NOTE — TELEPHONE ENCOUNTER
I spoke with pt and relayed lab results per ANDREW. Pt verbalized understanding. She requested more information. She would like a more comprehensive translation of what her numbers mean. I tried to explain the indications of the numbers and the ranges but she wanted the dr to explain it to her.

## 2022-01-11 NOTE — TELEPHONE ENCOUNTER
----- Message from Mignon Thomas MD sent at 1/10/2022 10:11 PM EST -----  Call patient. Decrease lasix from 80 bid to 80 am and 40 pm.  Ask about cpap/bipap. If not using it, really needs to. Make sure she is following up with pulmonary. ANDREW Guy, your echo looks better as far as heart function, but pressure in the lungs is higher. Also your labs show that your kidneys are appearing dehydrated. I want you to cut down on your furosemide a little. And I need to know if you are using your BIPAP machine? If not, need to start using it to help with pressure in lungs. I will have the office call you to discuss.   Mignon Thomas

## 2022-01-24 NOTE — TELEPHONE ENCOUNTER
Updated patient on Echo results (pulm pressures) and need for follow up with pulmonology and sleep medicine to be sure her Bipap pressure are correct. She states she is wearing the Bipap nightly. Referral for pulmonology placed with Dr. Briana Luke. Pt states she is struggling with her weight and bariatric surgery is on hold. She states she ONLY takes Lasix 5 days a week on Tues, Thursday, Fri, Sat, Sun when she is not at her office setting. She is aware of repeat renal panel in about a month and will call for appt with her other providers.

## 2022-02-25 RX ORDER — FUROSEMIDE 40 MG/1
TABLET ORAL
Qty: 360 TABLET | Refills: 3 | Status: SHIPPED | OUTPATIENT
Start: 2022-02-25

## 2022-03-22 RX ORDER — ERGOCALCIFEROL 1.25 MG/1
CAPSULE ORAL
Qty: 12 CAPSULE | Refills: 1 | Status: SHIPPED | OUTPATIENT
Start: 2022-03-22 | End: 2022-10-28

## 2022-03-31 NOTE — PROGRESS NOTES
[facility]  Advanced CHF/Pulmonary Hypertension   Cardiac Evaluation      Alma Gomez  YOB: 1970    Date of Visit:  4/1/22    Chief Complaint   Patient presents with    Congestive Heart Failure      History of Present Illness:  Alma Gomez is a 46 y.o. female with a past medical history of HTN, DM, TRUE on Bipap, asthma, and morbid obesity. She is following with [facility] Weight loss for possible bariatric surgery. She has received both Pfizer Covid vaccines late June into July thru [facility]. She has struggled with losing weight    Today, she is here for regular follow up. She states her breathing is no better and no worse. Discussed normal -130/below 85. She is using her Bipap every night. She made a repair to her filter on her Bipap and feels it helped. She goes into work on Monday and Wednesdays and does not take Lasix those days. All other days, she takes 80mg BID except on Thurs and Friday, only One Lasix 40mg in the evening instead of two. Last creat was up at 1.6. On April 25th, she will go back to work 4 days a week. Denies chest pain, shortness of breath, syncope, orthopnea, palpitations, or dizziness. Allergies   Allergen Reactions    Levofloxacin Other (See Comments)     headache     Current Outpatient Medications   Medication Sig Dispense Refill    vitamin D (ERGOCALCIFEROL) 1.25 MG (28338 UT) CAPS capsule TAKE 1 CAPSULE BY MOUTH 1 TIME A WEEK 12 capsule 1    furosemide (LASIX) 40 MG tablet TAKE 2 TABLETS BY MOUTH TWICE DAILY (Patient taking differently: Three days a week: 2 tablets morning and 1 tablet afternoon.   Sunday and Tuesday 2 tables BID) 360 tablet 3    spironolactone (ALDACTONE) 25 MG tablet TAKE 1 TABLET BY MOUTH DAILY 90 tablet 1    carvedilol (COREG) 25 MG tablet TAKE 1 TABLET BY MOUTH TWICE DAILY 180 tablet 1    ferrous sulfate (IRON 325) 325 (65 Fe) MG tablet Take 325 mg by mouth 2 times daily      irbesartan (AVAPRO) 75 MG tablet Take 1 tablet by mouth daily 90 tablet 3    atorvastatin (LIPITOR) 40 MG tablet Take 1 tablet by mouth daily 90 tablet 3    aspirin 81 MG chewable tablet Chew and swallow 1 tablet by mouth daily  90 tablet 2    glyBURIDE (DIABETA) 2.5 MG tablet TK 1 T PO D  5    albuterol sulfate  (90 Base) MCG/ACT inhaler Inhale 2 puffs into the lungs      Alpha-D-Galactosidase (BEANO MELTAWAYS) TABS Take 1 tablet by mouth      ketoconazole (NIZORAL) 2 % cream Apply topically daily as needed Apply topically daily. No current facility-administered medications for this visit. Past Medical History:   Diagnosis Date    Acute on chronic combined systolic and diastolic CHF (congestive heart failure) ([facility] [address] 1/26/2018    Asthma     Cardiomyopathy ([facility] [address]     Diabetes mellitus ([facility] [address]     Heart murmur     Hypertension     Obstructive sleep apnea (adult) (pediatric) 1/26/2018    Sleep apnea     Urinary incontinence      History reviewed. No pertinent surgical history.   Family History   Problem Relation Age of Onset    High Blood Pressure Mother     Other Mother         cardiac murmur    Arthritis Mother     Hypertension Mother     High Blood Pressure Father     Hypertension Father     Stroke Father     Arthritis Father     High Blood Pressure Sister     Diabetes Sister         prediabetic    Hypertension Sister     Hypertension Maternal Uncle     Cancer Maternal Uncle     Cancer Maternal Grandmother     Breast Cancer Maternal Aunt      Social History     Socioeconomic History    Marital status: Single     Spouse name: Not on file    Number of children: 3    Years of education: Not on file    Highest education level: Not on file   Occupational History    Not on file   Tobacco Use    Smoking status: Never Smoker    Smokeless tobacco: Never Used   Vaping Use    Vaping Use: Never used   Substance and Sexual Activity    Alcohol use: No    Drug use: No    Sexual activity: Never   Other Topics Concern    Not on file   Social History Narrative    Not on file     Social Determinants of Health     Financial Resource Strain:     Difficulty of Paying Living Expenses: Not on file   Food Insecurity:     Worried About Running Out of Food in the Last Year: Not on file    Suzanne of Food in the Last Year: Not on file   Transportation Needs:     Lack of Transportation (Medical): Not on file    Lack of Transportation (Non-Medical): Not on file   Physical Activity:     Days of Exercise per Week: Not on file    Minutes of Exercise per Session: Not on file   Stress:     Feeling of Stress : Not on file   Social Connections:     Frequency of Communication with Friends and Family: Not on file    Frequency of Social Gatherings with Friends and Family: Not on file    Attends Jainism Services: Not on file    Active Member of [address] [place] [address] or Organizations: Not on file    Attends Club or Organization Meetings: Not on file    Marital Status: Not on file   Intimate Partner Violence:     Fear of Current or Ex-Partner: Not on file    Emotionally Abused: Not on file    Physically Abused: Not on file    Sexually Abused: Not on file   Housing Stability:     Unable to Pay for Housing in the Last Year: Not on file    Number of [place] in the Last Year: Not on file    Unstable Housing in the Last Year: Not on file       Review of Systems:   · Constitutional: there has been no unanticipated weight loss. There's been no change in energy level, sleep pattern, or activity level. · Eyes: No visual changes or diplopia. No scleral icterus. · ENT: No Headaches, hearing loss or vertigo. No mouth sores or sore throat. · Cardiovascular: Reviewed in HPI  · Respiratory: No cough or wheezing, no sputum production. No hematemesis. · Gastrointestinal: No abdominal pain, appetite loss, blood in stools. No change in bowel or bladder habits.   · Genitourinary: No dysuria, trouble voiding, or hematuria. · Musculoskeletal:  No gait disturbance, weakness or joint complaints. · Integumentary: No rash or pruritis. · Neurological: No headache, diplopia, change in muscle strength, numbness or tingling. No change in gait, balance, coordination, mood, affect, memory, mentation, behavior. · Psychiatric: No anxiety, no depression. · Endocrine: No malaise, fatigue or temperature intolerance. No excessive thirst, fluid intake, or urination. No tremor. · Hematologic/Lymphatic: No abnormal bruising or bleeding, blood clots or swollen lymph nodes. · Allergic/Immunologic: No nasal congestion or hives. Physical Examination:    Vitals:    04/01/22 1133 04/01/22 1141   BP:  120/82   Pulse: 116 83   SpO2: (!) 85% 96%   Weight: (!) 439 lb (199.1 kg)    Height: 5' 4\" (1.626 m)      Body mass index is 75.35 kg/m². Wt Readings from Last 3 Encounters:   04/01/22 (!) 439 lb (199.1 kg)   11/05/21 (!) 445 lb (201.9 kg)   09/10/21 (!) 441 lb (200 kg)     BP Readings from Last 3 Encounters:   04/01/22 120/82   09/10/21 118/60   05/08/20 (!) 108/44     Constitutional and General Appearance: Morbidly obese  WD/WN in NAD,   HEENT:  NC/AT  GILBERTO  No problems with hearing  Skin:  Warm, dry  Respiratory:  · Normal excursion and expansion without use of accessory muscles  · Resp Auscultation: Normal breath sounds without dullness  Cardiovascular:  · The apical impulses not displaced  · Heart tones are crisp and normal  · Cervical veins are not engorged  · The carotid upstroke is normal in amplitude and contour without delay or bruit  · JVP less than 8 cm H2O  RRR with nl S1 and S2 without m,r,g  · Peripheral pulses are symmetrical and full  · There is no clubbing, cyanosis of the extremities.   · No edema  · Femoral Arteries: 2+ and equal  · Pedal Pulses: 2+ and equal   Neck:  · No thyromegaly  Abdomen:  · No masses or tenderness  · Liver/Spleen: No Abnormalities Noted  Neurological/Psychiatric:  · Alert and oriented in all spheres  · Moves all extremities well  · Exhibits normal gait balance and coordination  · No abnormalities of mood, affect, memory, mentation, or behavior are noted    Echo 1/7/22   Technically difficult and limited study. Left ventricular function is normal with ejection fraction estimated at 55%. No obvious regional wall motion abnormalities are noted. Diastolic filling parameters suggest grade II diastolic dysfunction. The aortic valve is not well visualized. Vmax 2.58 m/s, Mean PG= 14 mmHg,   suggestive of mild aortic stenosis. Mild tricuspid regurgitation. Estimated pulmonary artery systolic pressure   is at 57 mmHg assuming a right atrial pressure of 8 mmHg. Echo 1/26/ 2018  Technically limited study due to body habitus. Normal left ventricle size and wall thickness. Left ventricular function is difficult to estimate due to poor endocardial visualization but appears to be reduced and is estimated at 45-50%. Mild mitral regurgitation is present. There is mild-moderate tricuspid regurgitation with RVSP estimated at 48mmHg. Labs were reviewed including labs from other hospital systems through [facility]. Cardiac testing was reviewed including echos, nuclear scans, cardiac catheterization, including from other hospital systems through [facility]. Assessment:    1. Chronic combined systolic (congestive) and diastolic (congestive) heart failure ([address]    2. TRUE (obstructive sleep apnea)    3. Essential hypertension    4. SOB (shortness of breath)    5. Morbid obesity ([address]    6. Mixed hyperlipidemia    7. Type 2 diabetes mellitus without complication, without long-term current use of insulin ([address]    8. Muscle spasms of both lower extremities       1. Chronic systolic heart failure: Stable. Appears compensated.   -Continue Lasix, spironolactone, and Coreg.  -Continue irbesartan 75mg 1 tab daily.   -Continue to work on weight loss.    -ProBNP>76 (4/1/22), 56 (7/20/20),   -ECHO 2018> EF 45-50%  -Echo 1/7/22 EF 55% and RVSP 57mmHg     2. Essential hypertension:  Stable  /82   Pulse 83   Ht 5' 4\" (1.626 m)   Wt (!) 439 lb (199.1 kg)   SpO2 96%   BMI 75.35 kg/m²   ~Wt is about the same. 3. Hyperlipidemia: Stable on Lipitor 40mg  11/11/20> , TG 60, HDL 48, LDL 78  7/20/20> , TG 48, HDL 47,   ~1/7/22> , HDL 47,  LDL 67, . Under control. 4. Morbid obesity:  She has worked with [employer] loss and made dietary changes.   -has struggled to lose weight but keeps trying.    -+right thigh lymphedema noted      5. TRUE (obstructive sleep apnea):  Wearing Bipap. Follows with Dr. Adiel Root. 6. Vitamin D deficiency: Level was 16.8 from 7/20/21. Will begin Vit D supplement 50,000u every week. ~1/7/22 Vit D 28.1. Improved. Plan:  1. Labs on [address] Get BNP, A1C, Magnesium. 2.  Continue same medications for now. 3.   See Dr. Nanda Singh in 6  Months     Scribe's attestation: This note was scribed in the presence of Stefany Rios M.D. by [place] RN     The scribe's documentation has been prepared under my direction and personally reviewed by me in its entirety. I confirm that the note above accurately reflects all work, treatment, procedures, and medical decision making performed by me. Time Based Itemization  A total of 30 minutes was spent on today's patient encounter. If applicable, non-patient-facing activities:  ( x)Preparing to see the patient and reviewing records  (x ) Individual interpretation of results  ( ) Discussion or coordination of care with other health care professionals  ( x) Ordering of unique tests, medications, or procedures  ( x) Documentation within the EHR      I appreciate the opportunity of cooperating in the care of this patient.     Stefany Rios M.D., [facility]

## 2022-04-01 ENCOUNTER — HOSPITAL ENCOUNTER (OUTPATIENT)
Age: 52
Discharge: HOME | End: 2022-04-01
Payer: COMMERCIAL

## 2022-04-01 ENCOUNTER — OFFICE VISIT (OUTPATIENT)
Dept: CARDIOLOGY CLINIC | Age: 52
End: 2022-04-01
Payer: COMMERCIAL

## 2022-04-01 VITALS
DIASTOLIC BLOOD PRESSURE: 82 MMHG | SYSTOLIC BLOOD PRESSURE: 120 MMHG | OXYGEN SATURATION: 96 % | HEIGHT: 64 IN | HEART RATE: 83 BPM | BODY MASS INDEX: 50.02 KG/M2 | WEIGHT: 293 LBS

## 2022-04-01 DIAGNOSIS — G47.33 OSA (OBSTRUCTIVE SLEEP APNEA): ICD-10-CM

## 2022-04-01 DIAGNOSIS — I50.42 CHRONIC COMBINED SYSTOLIC (CONGESTIVE) AND DIASTOLIC (CONGESTIVE) HEART FAILURE (HCC): Primary | ICD-10-CM

## 2022-04-01 DIAGNOSIS — M62.838 MUSCLE SPASMS OF BOTH LOWER EXTREMITIES: ICD-10-CM

## 2022-04-01 DIAGNOSIS — E11.9 TYPE 2 DIABETES MELLITUS WITHOUT COMPLICATION, WITHOUT LONG-TERM CURRENT USE OF INSULIN (HCC): ICD-10-CM

## 2022-04-01 DIAGNOSIS — R06.02 SOB (SHORTNESS OF BREATH): ICD-10-CM

## 2022-04-01 DIAGNOSIS — E66.01 MORBID OBESITY (HCC): ICD-10-CM

## 2022-04-01 DIAGNOSIS — I10 ESSENTIAL HYPERTENSION: ICD-10-CM

## 2022-04-01 DIAGNOSIS — I50.42 CHRONIC COMBINED SYSTOLIC (CONGESTIVE) AND DIASTOLIC (CONGESTIVE) HEART FAILURE (HCC): ICD-10-CM

## 2022-04-01 DIAGNOSIS — E78.2 MIXED HYPERLIPIDEMIA: ICD-10-CM

## 2022-04-01 LAB
MAGNESIUM: 2.1 MG/DL (ref 1.8–2.4)
PRO-BNP: 76 PG/ML (ref 0–124)

## 2022-04-01 PROCEDURE — 3051F HG A1C>EQUAL 7.0%<8.0%: CPT | Performed by: INTERNAL MEDICINE

## 2022-04-01 PROCEDURE — 83735 ASSAY OF MAGNESIUM: CPT

## 2022-04-01 PROCEDURE — 99214 OFFICE O/P EST MOD 30 MIN: CPT | Performed by: INTERNAL MEDICINE

## 2022-04-01 PROCEDURE — 83036 HEMOGLOBIN GLYCOSYLATED A1C: CPT

## 2022-04-01 PROCEDURE — 36415 COLL VENOUS BLD VENIPUNCTURE: CPT

## 2022-04-01 PROCEDURE — 83880 ASSAY OF NATRIURETIC PEPTIDE: CPT

## 2022-04-01 NOTE — PATIENT INSTRUCTIONS
Plan:  1. Labs [address] Get BNP, A1C, Magnesium. 2.  Continue same medications for now.    3.   See Dr. Todd Pretty in 6  Months

## 2022-04-02 LAB
ESTIMATED AVERAGE GLUCOSE: 162.8 MG/DL
HBA1C MFR BLD: 7.3 %

## 2022-05-04 ENCOUNTER — APPOINTMENT (OUTPATIENT)
Dept: CT IMAGING | Age: 52
DRG: 202 | End: 2022-05-04
Payer: COMMERCIAL

## 2022-05-04 ENCOUNTER — HOSPITAL ENCOUNTER (INPATIENT)
Age: 52
LOS: 2 days | Discharge: HOME | DRG: 202 | End: 2022-05-06
Attending: FAMILY MEDICINE | Admitting: FAMILY MEDICINE
Payer: COMMERCIAL

## 2022-05-04 ENCOUNTER — APPOINTMENT (OUTPATIENT)
Dept: GENERAL RADIOLOGY | Age: 52
DRG: 202 | End: 2022-05-04
Payer: COMMERCIAL

## 2022-05-04 DIAGNOSIS — J45.901 EXACERBATION OF ASTHMA, UNSPECIFIED ASTHMA SEVERITY, UNSPECIFIED WHETHER PERSISTENT: ICD-10-CM

## 2022-05-04 DIAGNOSIS — J06.9 ACUTE UPPER RESPIRATORY INFECTION: ICD-10-CM

## 2022-05-04 DIAGNOSIS — R09.02 HYPOXIA: Primary | ICD-10-CM

## 2022-05-04 PROBLEM — J96.00 ACUTE RESPIRATORY FAILURE (HCC): Status: ACTIVE | Noted: 2022-05-04

## 2022-05-04 LAB
A/G RATIO: 1.3 (ref 1.1–2.2)
ALBUMIN SERPL-MCNC: 4.3 G/DL (ref 3.4–5)
ALP BLD-CCNC: 157 U/L (ref 40–129)
ALT SERPL-CCNC: 21 U/L (ref 10–40)
ANION GAP SERPL CALCULATED.3IONS-SCNC: 9 MMOL/L (ref 3–16)
AST SERPL-CCNC: 20 U/L (ref 15–37)
BASOPHILS ABSOLUTE: 0 K/UL (ref 0–0.2)
BASOPHILS RELATIVE PERCENT: 0.3 %
BILIRUB SERPL-MCNC: 0.4 MG/DL (ref 0–1)
BUN BLDV-MCNC: 19 MG/DL (ref 7–20)
C-REACTIVE PROTEIN: 27.7 MG/L (ref 0–5.1)
CALCIUM SERPL-MCNC: 9.8 MG/DL (ref 8.3–10.6)
CHLORIDE BLD-SCNC: 96 MMOL/L (ref 99–110)
CO2: 33 MMOL/L (ref 21–32)
CREAT SERPL-MCNC: 1.2 MG/DL (ref 0.6–1.1)
EOSINOPHILS ABSOLUTE: 0.3 K/UL (ref 0–0.6)
EOSINOPHILS RELATIVE PERCENT: 4.4 %
GFR AFRICAN AMERICAN: 57
GFR NON-AFRICAN AMERICAN: 47
GLUCOSE BLD-MCNC: 174 MG/DL (ref 70–99)
GLUCOSE BLD-MCNC: 234 MG/DL (ref 70–99)
HCT VFR BLD CALC: 42.7 % (ref 36–48)
HEMOGLOBIN: 13.1 G/DL (ref 12–16)
INFLUENZA A: NOT DETECTED
INFLUENZA B: NOT DETECTED
LACTIC ACID, SEPSIS: 1.1 MMOL/L (ref 0.4–1.9)
LYMPHOCYTES ABSOLUTE: 1.7 K/UL (ref 1–5.1)
LYMPHOCYTES RELATIVE PERCENT: 28.1 %
MCH RBC QN AUTO: 25.9 PG (ref 26–34)
MCHC RBC AUTO-ENTMCNC: 30.6 G/DL (ref 31–36)
MCV RBC AUTO: 84.6 FL (ref 80–100)
MONOCYTES ABSOLUTE: 0.2 K/UL (ref 0–1.3)
MONOCYTES RELATIVE PERCENT: 3.9 %
NEUTROPHILS ABSOLUTE: 3.8 K/UL (ref 1.7–7.7)
NEUTROPHILS RELATIVE PERCENT: 63.3 %
PDW BLD-RTO: 16.8 % (ref 12.4–15.4)
PERFORMED ON: ABNORMAL
PLATELET # BLD: 255 K/UL (ref 135–450)
PMV BLD AUTO: 8.5 FL (ref 5–10.5)
POTASSIUM REFLEX MAGNESIUM: 4 MMOL/L (ref 3.5–5.1)
PRO-BNP: 88 PG/ML (ref 0–124)
PROCALCITONIN: 0.05 NG/ML (ref 0–0.15)
RBC # BLD: 5.05 M/UL (ref 4–5.2)
SARS-COV-2 RNA, RT PCR: NOT DETECTED
SODIUM BLD-SCNC: 138 MMOL/L (ref 136–145)
TOTAL PROTEIN: 7.5 G/DL (ref 6.4–8.2)
TROPONIN: <0.01 NG/ML
WBC # BLD: 6.1 K/UL (ref 4–11)

## 2022-05-04 PROCEDURE — 87636 SARSCOV2 & INF A&B AMP PRB: CPT

## 2022-05-04 PROCEDURE — 84145 PROCALCITONIN (PCT): CPT

## 2022-05-04 PROCEDURE — 71260 CT THORAX DX C+: CPT

## 2022-05-04 PROCEDURE — 36415 COLL VENOUS BLD VENIPUNCTURE: CPT

## 2022-05-04 PROCEDURE — 6370000000 HC RX 637 (ALT 250 FOR IP): Performed by: PHYSICIAN ASSISTANT

## 2022-05-04 PROCEDURE — 6360000004 HC RX CONTRAST MEDICATION: Performed by: PHYSICIAN ASSISTANT

## 2022-05-04 PROCEDURE — 6370000000 HC RX 637 (ALT 250 FOR IP): Performed by: FAMILY MEDICINE

## 2022-05-04 PROCEDURE — 83880 ASSAY OF NATRIURETIC PEPTIDE: CPT

## 2022-05-04 PROCEDURE — 1200000000 HC SEMI PRIVATE

## 2022-05-04 PROCEDURE — 84484 ASSAY OF TROPONIN QUANT: CPT

## 2022-05-04 PROCEDURE — 93005 ELECTROCARDIOGRAM TRACING: CPT | Performed by: FAMILY MEDICINE

## 2022-05-04 PROCEDURE — 83036 HEMOGLOBIN GLYCOSYLATED A1C: CPT

## 2022-05-04 PROCEDURE — 6360000002 HC RX W HCPCS: Performed by: PHYSICIAN ASSISTANT

## 2022-05-04 PROCEDURE — 83605 ASSAY OF LACTIC ACID: CPT

## 2022-05-04 PROCEDURE — 85025 COMPLETE CBC W/AUTO DIFF WBC: CPT

## 2022-05-04 PROCEDURE — 99285 EMERGENCY DEPT VISIT HI MDM: CPT

## 2022-05-04 PROCEDURE — 86140 C-REACTIVE PROTEIN: CPT

## 2022-05-04 PROCEDURE — 96374 THER/PROPH/DIAG INJ IV PUSH: CPT

## 2022-05-04 PROCEDURE — 80053 COMPREHEN METABOLIC PANEL: CPT

## 2022-05-04 PROCEDURE — 94640 AIRWAY INHALATION TREATMENT: CPT

## 2022-05-04 PROCEDURE — 6360000002 HC RX W HCPCS: Performed by: FAMILY MEDICINE

## 2022-05-04 PROCEDURE — 71045 X-RAY EXAM CHEST 1 VIEW: CPT

## 2022-05-04 RX ORDER — IPRATROPIUM BROMIDE AND ALBUTEROL SULFATE 2.5; .5 MG/3ML; MG/3ML
1 SOLUTION RESPIRATORY (INHALATION) ONCE
Status: COMPLETED | OUTPATIENT
Start: 2022-05-04 | End: 2022-05-04

## 2022-05-04 RX ORDER — IPRATROPIUM BROMIDE AND ALBUTEROL SULFATE 2.5; .5 MG/3ML; MG/3ML
1 SOLUTION RESPIRATORY (INHALATION)
Status: DISCONTINUED | OUTPATIENT
Start: 2022-05-04 | End: 2022-05-06 | Stop reason: HOSPADM

## 2022-05-04 RX ORDER — GLIPIZIDE 5 MG/1
2.5 TABLET ORAL
Status: DISCONTINUED | OUTPATIENT
Start: 2022-05-05 | End: 2022-05-06 | Stop reason: HOSPADM

## 2022-05-04 RX ORDER — DEXAMETHASONE SODIUM PHOSPHATE 10 MG/ML
10 INJECTION, SOLUTION INTRAMUSCULAR; INTRAVENOUS ONCE
Status: COMPLETED | OUTPATIENT
Start: 2022-05-04 | End: 2022-05-04

## 2022-05-04 RX ORDER — SODIUM CHLORIDE 0.9 % (FLUSH) 0.9 %
5-40 SYRINGE (ML) INJECTION PRN
Status: DISCONTINUED | OUTPATIENT
Start: 2022-05-04 | End: 2022-05-06 | Stop reason: HOSPADM

## 2022-05-04 RX ORDER — ONDANSETRON 4 MG/1
4 TABLET, ORALLY DISINTEGRATING ORAL EVERY 8 HOURS PRN
Status: DISCONTINUED | OUTPATIENT
Start: 2022-05-04 | End: 2022-05-06 | Stop reason: HOSPADM

## 2022-05-04 RX ORDER — SODIUM CHLORIDE 9 MG/ML
INJECTION, SOLUTION INTRAVENOUS PRN
Status: DISCONTINUED | OUTPATIENT
Start: 2022-05-04 | End: 2022-05-06 | Stop reason: HOSPADM

## 2022-05-04 RX ORDER — METHYLPREDNISOLONE SODIUM SUCCINATE 40 MG/ML
40 INJECTION, POWDER, LYOPHILIZED, FOR SOLUTION INTRAMUSCULAR; INTRAVENOUS DAILY
Status: DISCONTINUED | OUTPATIENT
Start: 2022-05-05 | End: 2022-05-05

## 2022-05-04 RX ORDER — ASPIRIN 81 MG/1
81 TABLET, CHEWABLE ORAL DAILY
Status: DISCONTINUED | OUTPATIENT
Start: 2022-05-04 | End: 2022-05-06 | Stop reason: HOSPADM

## 2022-05-04 RX ORDER — ENOXAPARIN SODIUM 100 MG/ML
60 INJECTION SUBCUTANEOUS 2 TIMES DAILY
Status: DISCONTINUED | OUTPATIENT
Start: 2022-05-04 | End: 2022-05-06 | Stop reason: HOSPADM

## 2022-05-04 RX ORDER — MAGNESIUM SULFATE IN WATER 40 MG/ML
2000 INJECTION, SOLUTION INTRAVENOUS ONCE
Status: COMPLETED | OUTPATIENT
Start: 2022-05-04 | End: 2022-05-04

## 2022-05-04 RX ORDER — INSULIN LISPRO 100 [IU]/ML
0-6 INJECTION, SOLUTION INTRAVENOUS; SUBCUTANEOUS NIGHTLY
Status: DISCONTINUED | OUTPATIENT
Start: 2022-05-04 | End: 2022-05-06 | Stop reason: HOSPADM

## 2022-05-04 RX ORDER — SPIRONOLACTONE 25 MG/1
25 TABLET ORAL DAILY
Status: DISCONTINUED | OUTPATIENT
Start: 2022-05-04 | End: 2022-05-06

## 2022-05-04 RX ORDER — DEXTROSE MONOHYDRATE 50 MG/ML
100 INJECTION, SOLUTION INTRAVENOUS PRN
Status: DISCONTINUED | OUTPATIENT
Start: 2022-05-04 | End: 2022-05-06 | Stop reason: HOSPADM

## 2022-05-04 RX ORDER — ACETAMINOPHEN 325 MG/1
650 TABLET ORAL EVERY 6 HOURS PRN
Status: DISCONTINUED | OUTPATIENT
Start: 2022-05-04 | End: 2022-05-06 | Stop reason: HOSPADM

## 2022-05-04 RX ORDER — FUROSEMIDE 40 MG/1
40 TABLET ORAL DAILY
Status: DISCONTINUED | OUTPATIENT
Start: 2022-05-04 | End: 2022-05-06

## 2022-05-04 RX ORDER — SODIUM CHLORIDE 0.9 % (FLUSH) 0.9 %
5-40 SYRINGE (ML) INJECTION EVERY 12 HOURS SCHEDULED
Status: DISCONTINUED | OUTPATIENT
Start: 2022-05-04 | End: 2022-05-06 | Stop reason: HOSPADM

## 2022-05-04 RX ORDER — LOSARTAN POTASSIUM 25 MG/1
25 TABLET ORAL DAILY
Status: DISCONTINUED | OUTPATIENT
Start: 2022-05-04 | End: 2022-05-06 | Stop reason: HOSPADM

## 2022-05-04 RX ORDER — ACETAMINOPHEN 650 MG/1
650 SUPPOSITORY RECTAL EVERY 6 HOURS PRN
Status: DISCONTINUED | OUTPATIENT
Start: 2022-05-04 | End: 2022-05-06 | Stop reason: HOSPADM

## 2022-05-04 RX ORDER — ALBUTEROL SULFATE 2.5 MG/3ML
5 SOLUTION RESPIRATORY (INHALATION) ONCE
Status: COMPLETED | OUTPATIENT
Start: 2022-05-04 | End: 2022-05-04

## 2022-05-04 RX ORDER — POLYETHYLENE GLYCOL 3350 17 G/17G
17 POWDER, FOR SOLUTION ORAL DAILY PRN
Status: DISCONTINUED | OUTPATIENT
Start: 2022-05-04 | End: 2022-05-06 | Stop reason: HOSPADM

## 2022-05-04 RX ORDER — NICOTINE POLACRILEX 4 MG
15 LOZENGE BUCCAL PRN
Status: DISCONTINUED | OUTPATIENT
Start: 2022-05-04 | End: 2022-05-06 | Stop reason: HOSPADM

## 2022-05-04 RX ORDER — CARVEDILOL 25 MG/1
25 TABLET ORAL 2 TIMES DAILY
Status: DISCONTINUED | OUTPATIENT
Start: 2022-05-04 | End: 2022-05-06 | Stop reason: HOSPADM

## 2022-05-04 RX ORDER — DEXTROSE MONOHYDRATE 100 MG/ML
12.5 INJECTION, SOLUTION INTRAVENOUS PRN
Status: DISCONTINUED | OUTPATIENT
Start: 2022-05-04 | End: 2022-05-06 | Stop reason: HOSPADM

## 2022-05-04 RX ORDER — ONDANSETRON 2 MG/ML
4 INJECTION INTRAMUSCULAR; INTRAVENOUS EVERY 6 HOURS PRN
Status: DISCONTINUED | OUTPATIENT
Start: 2022-05-04 | End: 2022-05-06 | Stop reason: HOSPADM

## 2022-05-04 RX ORDER — INSULIN LISPRO 100 [IU]/ML
0-12 INJECTION, SOLUTION INTRAVENOUS; SUBCUTANEOUS
Status: DISCONTINUED | OUTPATIENT
Start: 2022-05-05 | End: 2022-05-06 | Stop reason: HOSPADM

## 2022-05-04 RX ADMIN — MAGNESIUM SULFATE HEPTAHYDRATE 2000 MG: 40 INJECTION, SOLUTION INTRAVENOUS at 16:18

## 2022-05-04 RX ADMIN — ENOXAPARIN SODIUM 60 MG: 60 INJECTION SUBCUTANEOUS at 20:41

## 2022-05-04 RX ADMIN — IPRATROPIUM BROMIDE AND ALBUTEROL SULFATE 1 AMPULE: .5; 3 SOLUTION RESPIRATORY (INHALATION) at 21:34

## 2022-05-04 RX ADMIN — DEXAMETHASONE SODIUM PHOSPHATE 10 MG: 10 INJECTION INTRAMUSCULAR; INTRAVENOUS at 13:09

## 2022-05-04 RX ADMIN — IOPAMIDOL 75 ML: 755 INJECTION, SOLUTION INTRAVENOUS at 14:38

## 2022-05-04 RX ADMIN — ALBUTEROL SULFATE 5 MG: 2.5 SOLUTION RESPIRATORY (INHALATION) at 15:40

## 2022-05-04 RX ADMIN — IPRATROPIUM BROMIDE AND ALBUTEROL SULFATE 1 AMPULE: .5; 3 SOLUTION RESPIRATORY (INHALATION) at 13:18

## 2022-05-04 ASSESSMENT — PAIN SCALES - GENERAL
PAINLEVEL_OUTOF10: 0
PAINLEVEL_OUTOF10: 5

## 2022-05-04 ASSESSMENT — PAIN DESCRIPTION - LOCATION: LOCATION: CHEST

## 2022-05-04 ASSESSMENT — PAIN - FUNCTIONAL ASSESSMENT: PAIN_FUNCTIONAL_ASSESSMENT: 0-10

## 2022-05-04 NOTE — ED PROVIDER NOTES
[facility]        Pt Name: Artie Reza  MRN: [id number]  [place] 1970  Date of evaluation: 5/4/2022  Provider: Mathieu Del Cid PA-C  PCP: Iva Severance  Note Started: 12:37 PM EDT       LUIS. I have evaluated this patient. My supervising physician was available for consultation. I personally saw the patient and independently provided 38 minutes of non-concurrent critical care time out of the total critical care time provided. This excludes time spent doing separately billable procedures. This includes time at the bedside, data interpretation, medication management, obtaining critical history from collateral sources if the patient is unable to provide it directly, and physician consultation. Specifics of interventions taken and potentially life-threatening diagnostic considerations are listed above in the medical decision making. CHIEF COMPLAINT       Chief Complaint   Patient presents with    Shortness of Breath     cough and laryngitis x 1 month, shortness of breath x 3-4 days. Hx asthma and CHF - no rescue inhaler used due to being out of meds       HISTORY OF PRESENT ILLNESS   (Location, Timing/Onset, Context/Setting, Quality, Duration, Modifying Factors, Severity, Associated Signs and Symptoms)  Note limiting factors. Chief Complaint: Shortness of breath    Luis Enrique Braga is a 46 y.o. female who presents to the emergency department with a chief complaint of shortness of breath and cough that is occasionally productive over the past 3 to 4 days. She states she is been having a mild dry cough and some sore throat for the past 1 month but her symptoms became worse over the past 3 to 4 days and she is out of her inhaler. She has history of of hypertension, diabetes, CHF, cardiomyopathy. Denies any leg swelling. States she occasionally gets some chest pain only when she coughs.   Denies abdominal pain, vomiting, known fevers, urinary symptoms or bowel symptoms. She has not had her COVID booster. She has not been vaccinated for influenza. Denies any recent sick contacts. Denies any other symptoms. Nursing Notes were all reviewed and agreed with or any disagreements were addressed in the HPI. REVIEW OF SYSTEMS    (2-9 systems for level 4, 10 or more for level 5)     Review of Systems    Positives and Pertinent negatives as per HPI. Except as noted above in the ROS, all other systems were reviewed and negative. PAST MEDICAL HISTORY     Past Medical History:   Diagnosis Date    Acute on chronic combined systolic and diastolic CHF (congestive heart failure) ([facility] [address] 1/26/2018    Asthma     Cardiomyopathy ([facility] [address] [facility]     Diabetes mellitus ([facility] [address]     Heart murmur     Hypertension     Obstructive sleep apnea (adult) (pediatric) 1/26/2018    Sleep apnea     Urinary incontinence          SURGICAL HISTORY   History reviewed. No pertinent surgical history. CURRENTMEDICATIONS       Previous Medications    ALBUTEROL SULFATE  (90 BASE) MCG/ACT INHALER    Inhale 2 puffs into the lungs    ALPHA-D-GALACTOSIDASE (BEANO MELTAWAYS) TABS    Take 1 tablet by mouth    ASPIRIN 81 MG CHEWABLE TABLET    Chew and swallow 1 tablet by mouth daily     ATORVASTATIN (LIPITOR) 40 MG TABLET    Take 1 tablet by mouth daily    CARVEDILOL (COREG) 25 MG TABLET    TAKE 1 TABLET BY MOUTH TWICE DAILY    FERROUS SULFATE (IRON 325) 325 (65 FE) MG TABLET    Take 325 mg by mouth 2 times daily    FUROSEMIDE (LASIX) 40 MG TABLET    TAKE 2 TABLETS BY MOUTH TWICE DAILY    GLYBURIDE (DIABETA) 2.5 MG TABLET    TK 1 T PO D    IRBESARTAN (AVAPRO) 75 MG TABLET    Take 1 tablet by mouth daily    KETOCONAZOLE (NIZORAL) 2 % CREAM    Apply topically daily as needed Apply topically daily.      SPIRONOLACTONE (ALDACTONE) 25 MG TABLET    TAKE 1 TABLET BY MOUTH DAILY    VITAMIN D (ERGOCALCIFEROL) 1.25 MG (22328 UT) CAPS CAPSULE    TAKE 1 CAPSULE BY MOUTH 1 TIME A WEEK         ALLERGIES     Levofloxacin    FAMILYHISTORY       Family History   Problem Relation Age of Onset    High Blood Pressure Mother     Other Mother         cardiac murmur    Arthritis Mother     Hypertension Mother     High Blood Pressure Father     Hypertension Father     Stroke Father     Arthritis Father     High Blood Pressure Sister     Diabetes Sister         prediabetic    Hypertension Sister     Hypertension Maternal Uncle     Cancer Maternal Uncle     Cancer Maternal Grandmother     Breast Cancer Maternal Aunt           SOCIAL HISTORY       Social History     Tobacco Use    Smoking status: Never Smoker    Smokeless tobacco: Never Used   Vaping Use    Vaping Use: Never used   Substance Use Topics    Alcohol use: No    Drug use: No       SCREENINGS    Tawanda Coma Scale  Eye Opening: Spontaneous  Best Verbal Response: Oriented  Best Motor Response: Obeys commands  Tawanda Coma Scale Score: 15        PHYSICAL EXAM    (up to 7 for level 4, 8 or more for level 5)     ED Triage Vitals [05/04/22 1225]   BP Temp Temp Source Pulse Resp SpO2 Height Weight   (!) 156/65 98.1 °F (36.7 °C) Oral 91 22 (!) 88 % 5' 4.5\" (1.638 m) (!) 443 lb (200.9 kg)       Physical Exam  Vitals and nursing note reviewed. Constitutional:       Appearance: She is well-developed. She is not diaphoretic. Comments: Body mass index is 74.87 kg/m². HENT:      Head: Atraumatic. Nose: Nose normal.   Eyes:      General:         Right eye: No discharge. Left eye: No discharge. Cardiovascular:      Rate and Rhythm: Normal rate and regular rhythm. Heart sounds: No murmur heard. No friction rub. No gallop. Pulmonary:      Effort: Pulmonary effort is normal. No respiratory distress. Breath sounds: No stridor. No wheezing, rhonchi or rales. Comments: Decreased breath sounds with poor inspiratory effort. Mild bronchospastic cough and some rhonchi noted.   Abdominal: General: Bowel sounds are normal. There is no distension. Palpations: Abdomen is soft. There is no mass. Tenderness: There is no abdominal tenderness. There is no guarding or rebound. Hernia: No hernia is present. Musculoskeletal:         General: No swelling. Normal range of motion. Cervical back: Normal range of motion. Skin:     General: Skin is warm and dry. Findings: No erythema or rash. Neurological:      Mental Status: She is alert and oriented to person, place, and time. Cranial Nerves: No cranial nerve deficit. Psychiatric:         Behavior: Behavior normal.         DIAGNOSTIC RESULTS   LABS:    Labs Reviewed   CBC WITH AUTO DIFFERENTIAL - Abnormal; Notable for the following components:       Result Value    MCH 25.9 (*)     MCHC 30.6 (*)     RDW 16.8 (*)     All other components within normal limits   COMPREHENSIVE METABOLIC PANEL W/ REFLEX TO MG FOR LOW K - Abnormal; Notable for the following components:    Chloride 96 (*)     CO2 33 (*)     Glucose 174 (*)     CREATININE 1.2 (*)     GFR Non- 47 (*)     GFR African American 57 (*)     Alkaline Phosphatase 157 (*)     All other components within normal limits   C-REACTIVE PROTEIN - Abnormal; Notable for the following components:    CRP 27.7 (*)     All other components within normal limits   COVID-19 & INFLUENZA COMBO   TROPONIN   BRAIN NATRIURETIC PEPTIDE   LACTATE, SEPSIS   PROCALCITONIN   LACTATE, SEPSIS       When ordered only abnormal lab results are displayed. All other labs were within normal range or not returned as of this dictation. EKG: When ordered, EKG's are interpreted by the Emergency Department Physician in the absence of a cardiologist.  Please see their note for interpretation of EKG.     RADIOLOGY:   Non-plain film images such as CT, Ultrasound and MRI are read by the radiologist. Plain radiographic images are visualized and preliminarily interpreted by the ED Provider with the below findings:        Interpretation per the Radiologist below, if available at the time of this note:    CT CHEST PULMONARY EMBOLISM W CONTRAST   Final Result   1. No acute abnormality. XR CHEST PORTABLE   Final Result   Pulmonary vascular congestion versus reactive airway disease. No results found. PROCEDURES   Unless otherwise noted below, none     Procedures    CRITICAL CARE TIME       CONSULTS:  IP CONSULT TO HOSPITALIST      EMERGENCY DEPARTMENT COURSE and DIFFERENTIAL DIAGNOSIS/MDM:   Vitals:    Vitals:    05/04/22 1330 05/04/22 1345 05/04/22 1400 05/04/22 1430   BP: (!) 124/54 129/87 (!) 125/48    Pulse: 84 73 88 88   Resp: 20 18 18 21   Temp:       TempSrc:       SpO2: 97% 96% 99% 95%   Weight:       Height:           Patient was given the following medications:  Medications   magnesium sulfate 2000 mg in 50 mL IVPB premix (2,000 mg IntraVENous New Bag 5/4/22 1618)   dexamethasone (PF) (DECADRON) injection 10 mg (10 mg IntraVENous Given 5/4/22 1309)   ipratropium-albuterol (DUONEB) nebulizer solution 1 ampule (1 ampule Inhalation Given 5/4/22 1318)   iopamidol (ISOVUE-370) 76 % injection 75 mL (75 mLs IntraVENous Given 5/4/22 1438)   albuterol (PROVENTIL) nebulizer solution 5 mg (5 mg Nebulization Given 5/4/22 1540)           Patient presented with some cough and shortness of breath. Difficult history and physical given patient's BMI poor inspiratory effort. X-ray with possible reactive airway disease versus pulmonary vascular congestion. Laboratory testing unremarkable. After nebulizer treatment here and steroids her oxygen saturation went into the room is still in the low 90s on 2 L of oxygen. Still has diminished breath sounds with decreased inspiratory effort. CT was obtained which is unremarkable. Suspect viral upper respiratory infection with asthma exacerbation and hypoxia.   Given her hypoxia and persistent respiratory symptoms will admit for further work-up and treatment. Was given more nebulizer treatments and IV magnesium. Low suspicion for pulmonary embolus, bacterial pneumonia or other emergent etiology. She is negative for COVID and influenza. Stable time of admission. FINAL IMPRESSION      1. Hypoxia    2. Exacerbation of asthma, unspecified asthma severity, unspecified whether persistent    3. Acute upper respiratory infection          DISPOSITION/PLAN   DISPOSITION Admitted 05/04/2022 04:21:17 PM      PATIENT REFERRED TO:  No follow-up provider specified.     DISCHARGE MEDICATIONS:  New Prescriptions    No medications on file       DISCONTINUED MEDICATIONS:  Discontinued Medications    No medications on file              (Please note that portions of this note were completed with a voice recognition program.  Efforts were made to edit the dictations but occasionally words are mis-transcribed.)    Oleg Bhagat PA-C (electronically signed)            Oleg Bhagat PA-C  05/04/22 6522

## 2022-05-04 NOTE — ED PROVIDER NOTES
EKG is read by myself. Dated today [phone].   Rate 92 sinus rhythm normal EKG no significant change from January 2018     Williams Orozco MD  05/04/22 1246

## 2022-05-05 LAB
ANION GAP SERPL CALCULATED.3IONS-SCNC: 7 MMOL/L (ref 3–16)
BUN BLDV-MCNC: 22 MG/DL (ref 7–20)
CALCIUM SERPL-MCNC: 9.8 MG/DL (ref 8.3–10.6)
CHLORIDE BLD-SCNC: 100 MMOL/L (ref 99–110)
CO2: 30 MMOL/L (ref 21–32)
CREAT SERPL-MCNC: 1.2 MG/DL (ref 0.6–1.1)
EKG ATRIAL RATE: 92 BPM
EKG DIAGNOSIS: NORMAL
EKG P AXIS: 45 DEGREES
EKG P-R INTERVAL: 158 MS
EKG Q-T INTERVAL: 372 MS
EKG QRS DURATION: 94 MS
EKG QTC CALCULATION (BAZETT): 460 MS
EKG R AXIS: 15 DEGREES
EKG T AXIS: 32 DEGREES
EKG VENTRICULAR RATE: 92 BPM
ESTIMATED AVERAGE GLUCOSE: 168.6 MG/DL
GFR AFRICAN AMERICAN: 57
GFR NON-AFRICAN AMERICAN: 47
GLUCOSE BLD-MCNC: 156 MG/DL (ref 70–99)
GLUCOSE BLD-MCNC: 181 MG/DL (ref 70–99)
GLUCOSE BLD-MCNC: 190 MG/DL (ref 70–99)
GLUCOSE BLD-MCNC: 205 MG/DL (ref 70–99)
GLUCOSE BLD-MCNC: 216 MG/DL (ref 70–99)
GLUCOSE BLD-MCNC: 226 MG/DL (ref 70–99)
HBA1C MFR BLD: 7.5 %
HCT VFR BLD CALC: 39.3 % (ref 36–48)
HEMOGLOBIN: 12.2 G/DL (ref 12–16)
MCH RBC QN AUTO: 26.8 PG (ref 26–34)
MCHC RBC AUTO-ENTMCNC: 31.1 G/DL (ref 31–36)
MCV RBC AUTO: 86 FL (ref 80–100)
PDW BLD-RTO: 16.7 % (ref 12.4–15.4)
PERFORMED ON: ABNORMAL
PLATELET # BLD: 221 K/UL (ref 135–450)
PMV BLD AUTO: 8.5 FL (ref 5–10.5)
POTASSIUM SERPL-SCNC: 5.2 MMOL/L (ref 3.5–5.1)
RBC # BLD: 4.57 M/UL (ref 4–5.2)
SODIUM BLD-SCNC: 137 MMOL/L (ref 136–145)
WBC # BLD: 6.8 K/UL (ref 4–11)

## 2022-05-05 PROCEDURE — 80048 BASIC METABOLIC PNL TOTAL CA: CPT

## 2022-05-05 PROCEDURE — 6370000000 HC RX 637 (ALT 250 FOR IP): Performed by: FAMILY MEDICINE

## 2022-05-05 PROCEDURE — 93010 ELECTROCARDIOGRAM REPORT: CPT | Performed by: INTERNAL MEDICINE

## 2022-05-05 PROCEDURE — 6360000002 HC RX W HCPCS: Performed by: FAMILY MEDICINE

## 2022-05-05 PROCEDURE — 97165 OT EVAL LOW COMPLEX 30 MIN: CPT

## 2022-05-05 PROCEDURE — 2700000000 HC OXYGEN THERAPY PER DAY

## 2022-05-05 PROCEDURE — 36415 COLL VENOUS BLD VENIPUNCTURE: CPT

## 2022-05-05 PROCEDURE — 94640 AIRWAY INHALATION TREATMENT: CPT

## 2022-05-05 PROCEDURE — 94761 N-INVAS EAR/PLS OXIMETRY MLT: CPT

## 2022-05-05 PROCEDURE — 6360000002 HC RX W HCPCS: Performed by: PHYSICIAN ASSISTANT

## 2022-05-05 PROCEDURE — 97161 PT EVAL LOW COMPLEX 20 MIN: CPT

## 2022-05-05 PROCEDURE — 1200000000 HC SEMI PRIVATE

## 2022-05-05 PROCEDURE — 2580000003 HC RX 258: Performed by: FAMILY MEDICINE

## 2022-05-05 PROCEDURE — 97530 THERAPEUTIC ACTIVITIES: CPT

## 2022-05-05 PROCEDURE — 94680 O2 UPTK RST&XERS DIR SIMPLE: CPT

## 2022-05-05 PROCEDURE — 97116 GAIT TRAINING THERAPY: CPT

## 2022-05-05 PROCEDURE — 85027 COMPLETE CBC AUTOMATED: CPT

## 2022-05-05 PROCEDURE — 6370000000 HC RX 637 (ALT 250 FOR IP): Performed by: PHYSICIAN ASSISTANT

## 2022-05-05 RX ORDER — INSULIN GLARGINE 100 [IU]/ML
20 INJECTION, SOLUTION SUBCUTANEOUS NIGHTLY
Status: DISCONTINUED | OUTPATIENT
Start: 2022-05-05 | End: 2022-05-06 | Stop reason: HOSPADM

## 2022-05-05 RX ORDER — METHYLPREDNISOLONE SODIUM SUCCINATE 40 MG/ML
40 INJECTION, POWDER, LYOPHILIZED, FOR SOLUTION INTRAMUSCULAR; INTRAVENOUS EVERY 12 HOURS
Status: DISCONTINUED | OUTPATIENT
Start: 2022-05-05 | End: 2022-05-06 | Stop reason: HOSPADM

## 2022-05-05 RX ORDER — FUROSEMIDE 40 MG/1
40 TABLET ORAL ONCE
Status: COMPLETED | OUTPATIENT
Start: 2022-05-05 | End: 2022-05-05

## 2022-05-05 RX ADMIN — METHYLPREDNISOLONE SODIUM SUCCINATE 40 MG: 40 INJECTION, POWDER, FOR SOLUTION INTRAMUSCULAR; INTRAVENOUS at 21:00

## 2022-05-05 RX ADMIN — ENOXAPARIN SODIUM 60 MG: 60 INJECTION SUBCUTANEOUS at 08:50

## 2022-05-05 RX ADMIN — INSULIN GLARGINE 20 UNITS: 100 INJECTION, SOLUTION SUBCUTANEOUS at 20:59

## 2022-05-05 RX ADMIN — Medication 10 ML: at 20:59

## 2022-05-05 RX ADMIN — IPRATROPIUM BROMIDE AND ALBUTEROL SULFATE 1 AMPULE: .5; 3 SOLUTION RESPIRATORY (INHALATION) at 12:03

## 2022-05-05 RX ADMIN — INSULIN LISPRO 2 UNITS: 100 INJECTION, SOLUTION INTRAVENOUS; SUBCUTANEOUS at 08:50

## 2022-05-05 RX ADMIN — GLIPIZIDE 2.5 MG: 5 TABLET ORAL at 08:50

## 2022-05-05 RX ADMIN — METHYLPREDNISOLONE SODIUM SUCCINATE 40 MG: 40 INJECTION, POWDER, FOR SOLUTION INTRAMUSCULAR; INTRAVENOUS at 08:51

## 2022-05-05 RX ADMIN — Medication 10 ML: at 08:51

## 2022-05-05 RX ADMIN — INSULIN LISPRO 2 UNITS: 100 INJECTION, SOLUTION INTRAVENOUS; SUBCUTANEOUS at 01:55

## 2022-05-05 RX ADMIN — IPRATROPIUM BROMIDE AND ALBUTEROL SULFATE 1 AMPULE: .5; 3 SOLUTION RESPIRATORY (INHALATION) at 08:15

## 2022-05-05 RX ADMIN — FUROSEMIDE 40 MG: 40 TABLET ORAL at 10:59

## 2022-05-05 RX ADMIN — INSULIN LISPRO 2 UNITS: 100 INJECTION, SOLUTION INTRAVENOUS; SUBCUTANEOUS at 16:48

## 2022-05-05 RX ADMIN — IPRATROPIUM BROMIDE AND ALBUTEROL SULFATE 1 AMPULE: .5; 3 SOLUTION RESPIRATORY (INHALATION) at 20:40

## 2022-05-05 RX ADMIN — IPRATROPIUM BROMIDE AND ALBUTEROL SULFATE 1 AMPULE: .5; 3 SOLUTION RESPIRATORY (INHALATION) at 16:18

## 2022-05-05 RX ADMIN — INSULIN LISPRO 2 UNITS: 100 INJECTION, SOLUTION INTRAVENOUS; SUBCUTANEOUS at 21:46

## 2022-05-05 RX ADMIN — ENOXAPARIN SODIUM 60 MG: 60 INJECTION SUBCUTANEOUS at 20:59

## 2022-05-05 RX ADMIN — INSULIN LISPRO 2 UNITS: 100 INJECTION, SOLUTION INTRAVENOUS; SUBCUTANEOUS at 12:21

## 2022-05-05 ASSESSMENT — PAIN SCALES - GENERAL
PAINLEVEL_OUTOF10: 0

## 2022-05-05 NOTE — PROGRESS NOTES
[facility] PROGRESS NOTE    5/5/2022 8:06 AM        Name: Cassi Nevarez . Admitted: 5/4/2022  Primary Care Provider: Iván Zamora (Tel: [phone])      Subjective:  .   Started with exertional SOB and wheezing about a week ago but worsened Monday    Reviewed interval ancillary notes    Current Medications  ipratropium-albuterol (DUONEB) nebulizer solution 1 ampule, Q4H WA  methylPREDNISolone sodium (SOLU-MEDROL) injection 40 mg, Daily  aspirin chewable tablet 81 mg, Daily  carvedilol (COREG) tablet 25 mg, BID  furosemide (LASIX) tablet 40 mg, Daily  losartan (COZAAR) tablet 25 mg, Daily  spironolactone (ALDACTONE) tablet 25 mg, Daily  glipiZIDE (GLUCOTROL) tablet 2.5 mg, QAM AC  sodium chloride flush 0.9 % injection 5-40 mL, 2 times per day  sodium chloride flush 0.9 % injection 5-40 mL, PRN  0.9 % sodium chloride infusion, PRN  enoxaparin (LOVENOX) injection 60 mg, BID  ondansetron (ZOFRAN-ODT) disintegrating tablet 4 mg, Q8H PRN   Or  ondansetron (ZOFRAN) injection 4 mg, Q6H PRN  polyethylene glycol (GLYCOLAX) packet 17 g, Daily PRN  acetaminophen (TYLENOL) tablet 650 mg, Q6H PRN   Or  acetaminophen (TYLENOL) suppository 650 mg, Q6H PRN  glucose (GLUTOSE) 40 % oral gel 15 g, PRN  dextrose 10 % infusion, PRN  glucagon (rDNA) injection 1 mg, PRN  dextrose 5 % solution, PRN  insulin lispro (HUMALOG) injection vial 0-12 Units, TID WC  insulin lispro (HUMALOG) injection vial 0-6 Units, Nightly        Objective:  /72   Pulse 89   Temp 98.9 °F (37.2 °C) (Oral)   Resp 20   Ht 5' 4.5\" (1.638 m)   Wt (!) 449 lb 9.6 oz (203.9 kg)   SpO2 95%   BMI 75.98 kg/m²   No intake or output data in the 24 hours ending 05/05/22 0806   Wt Readings from Last 3 Encounters:   05/05/22 (!) 449 lb 9.6 oz (203.9 kg)   04/01/22 (!) 439 lb (199.1 kg)   11/05/21 (!) 445 lb (201.9 kg)       General appearance:  Appears comfortable  Eyes: Sclera clear. Pupils equal.  ENT: Moist oral mucosa. Trachea midline, no adenopathy. Cardiovascular: Regular rhythm, normal S1, S2. No murmur. No edema in lower extremities  Respiratory: Not using accessory muscles. Good inspiratory effort. Clear to auscultation bilaterally, no wheeze or crackles. GI: Abdomen soft, no tenderness, not distended, normal bowel sounds  Musculoskeletal: No cyanosis in digits, neck supple  Neurology: CN 2-12 grossly intact. No speech or motor deficits  Psych: Normal affect. Alert and oriented in time, place and person  Skin: Warm, dry, normal turgor    Labs and Tests:  CBC:   Recent Labs     05/04/22  1245 05/05/22  0532   WBC 6.1 6.8   HGB 13.1 12.2    221     BMP:    Recent Labs     05/04/22  1245 05/05/22  0532    137   K 4.0 5.2*   CL 96* 100   CO2 33* 30   BUN 19 22*   CREATININE 1.2* 1.2*   GLUCOSE 174* 205*     Hepatic:   Recent Labs     05/04/22  1245   AST 20   ALT 21   BILITOT 0.4   ALKPHOS 157*     CTPA  1. No acute abnormality. Problem List  Principal Problem:    Acute respiratory failure ([facility] [address]  Resolved Problems:    * No resolved hospital problems. *       Assessment & Plan:   1. Asthma exacerbation-minimal wheezing but requiring 2 liters. Continue solu medrol. Wean oxygen as tolerated. Could probably dc later today vs tomorrow. 2. Dm 2-a1c 7. 3. continue medium SSI. Add 20 of lantus. Continue medium SSI. 3. Morbid obesity      Diet: ADULT DIET;  Regular; 4 carb choices (60 gm/meal)  Code:Full Code        Lauren Tucker PA-C   5/5/2022 8:06 AM

## 2022-05-05 NOTE — PLAN OF CARE
Results for Uyen Merchant (MRN [id number]) as of 5/5/2022 12:13   Ref. Range 5/5/2022 05:32   Potassium Latest Ref Range: 3.5 - 5.1 mmol/L 5.2 (H)     Results for Uyen Merchant (MRN [id number]) as of 5/5/2022 12:13   Ref. Range 5/5/2022 05:32   BUN,BUNPL Latest Ref Range: 7 - 20 mg/dL 22 (H)   Creatinine Latest Ref Range: 0.6 - 1.1 mg/dL 1.2 (H)     Results for Uyen Merchant (MRN [id number]) as of 5/5/2022 12:13   Ref.  Range 5/5/2022 05:32   GFR  Latest Ref Range: >60  57 (A)       [facility], RN   [phone]

## 2022-05-05 NOTE — PROGRESS NOTES
[facility] [address]   Phone: [phone]    Occupational Therapy    [x] Initial Evaluation            [] Daily Treatment Note         [] Discharge Summary      Patient: Zoë Arenas   : 1970   MRN: [id number]   Date of Service:  2022    Admitting Diagnosis:  Acute respiratory failure [facility])  Current Admission Summary: Per H&P on  \"51 y. o. female with h/o Asthma , DM presented with c/o dyspnea , cough, and sore throat on going for the past month. She has been using inhalers at home. But symptoms continue to worsen . She does not wear O2 . Found to be hypoxic in the ED. Sats in high 80's placed on 2 L O2 via NC . Chest CT is neg for pneumonia or edema. She has been given breathing treatments and IV Decadron \"  Past Medical History:  has a past medical history of Acute on chronic combined systolic and diastolic CHF (congestive heart failure) ([address] Asthma, Cardiomyopathy ([address] Diabetes mellitus ([address] Heart murmur, Hypertension, Obstructive sleep apnea (adult) (pediatric), Sleep apnea, and Urinary incontinence. Past Surgical History:  has no past surgical history on file. Discharge Recommendations: Zoë Arenas scored a  /24 on the AM-PAC ADL Inpatient form. At this time, no further OT is recommended upon discharge due to pt currently functioning at her baseline of independent with ADLs. Recommend patient returns to prior setting with prior services. DME Required For Discharge: no DME required at discharge    Precautions/Restrictions: high fall risk    Pre-Admission Information   Lives With: alone                     Type of Home: apartment, .  Comment: [facility]  Home Layout: two level, 13 stairs to 2nd level with [address], 1 step to enter without rails   Bathroom Layout: walk in shower  Toilet Height: standard height  Bathroom Equipment: . [place] Haggis: Pt has bench seat but does not use unless washing hair.  Typically steps into shower to rinse off breifly, then walks back to bed to complete bathing with soap, then walks back to shower to rinse off. Pt reports her shower is \"too tight\" to remain sitting in cubical.   Home Equipment: standard walker  Transfer Assistance: Independent without use of device  Ambulation Assistance:Independent without use of device  ADL Assistance: independent with all ADL's  IADL Assistance: independent with homemaking tasks, completes most ADLs/IADLs while sitting in an \"office chair\"  Active :        [x]? ? Yes                 []? ? No  Hand Dominance: []?? Left                 [x]? ? Right  Current Employment: part time employment.  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 Floor was wet and slipped. Pt indicating no fractures, just bruising and soreness. Pt also states that she only negotiates her steps x1 time during the day to conserve energy. Examination   Vision:   Vision Gross Assessment: WFL  Hearing:   WFL  Perception:   WFL  Observation:   General Observation:  Pt is 449lbs   Posture:   good  Sensation:   WFL  Proprioception:    WFL  Tone:   Normotonic  Coordination Testing:   WFL    ROM:   (B) UE ROM WFL  Strength:   (B) UE strength grossly 5    Decision Making: low complexity  Clinical Presentation: stable      Subjective  General: Pt presenting sitting EOB eating lunch upon arrival. She is pleasant and agreeable to PT/OT evaluations. Pain: 4/10. Location: chest pain but not angina--mostly when coughing or taking a deep breath  Pain Interventions: patient denies pain interventions        Activities of Daily Living  Basic Activities of Daily Living  Feeding: setup assistance. Pt politely declines other ADLs this date. Instrumental Activities of Daily Living  No IADL completed on this date. Functional Mobility  Bed Mobility  Bed mobility not completed on this date.   Comments:Pt sitting EOB upon arrival and departure  Transfers  Sit to stand transfer:Independent  Stand to sit transfer: Independent  Toilet transfer: modified independent. Mobility technique: ambulating. Equipment utilized: standard toilet, grab bars  Comments:  Functional Mobility:  Sitting Balance: Independent. Duration: ~30+ min. Activity: sitting EOB. Standing Balance: Independent. Duration: ~4-5 min. Activity: ambulating in room. Functional Mobility: no device. Independent. Activity: to/from bathroom, Pt completed series of ambulation activities with graded O2 settings to assess home O2 needs. Pt completed x5 trials of ambulation with activity tolerance and recoveries listed below. Please see PT notes for further detials regarding distances and gait quality. The following ambulation activities and activity tolerance obtained with PT:   1st ambulation on 1L (55 ft w/ toilet transfer), pt dropped to 84%, required 1 min 30 sec to recover. 2nd ambulation on 1L (50 ft w/o toilet transfer), pt dropped to 88%, required 40 sec to recover   3rd ambulation on RA (50 ft), pt dropped to 85%, recovered in 1 min 10 sec  4th ambulation on 2 L (50 ft), pt briefly dropped to 88% while pt talking during recovery, immediately 90% once pt stops talking. 5th ambulation (12+12 ft with toilet transfer) on 2L, pt maintained 90%  -- instructed/educated on energy conservation techniques (pursed lip breathing) throughout session. Functional Outcomes       Cognition  WFL  Orientation:    alert and oriented x 4  Command Following:   [facility]     Education  Barriers To Learning: none  Patient Education: patient educated on goals, OT role and benefits  Learning Assessment:  patient verbalizes and demonstrates understanding    Assessment  Activity Tolerance: Pt tolerated treatment well. Please see objective section above for further details regarding  activity tolerance and O2 demands.   Impairments Requiring Therapeutic Intervention: decreased endurance  Prognosis: good  Clinical Assessment: Pt presenting at her functional baseline of independent with ADLs at this time; however, exhibits decreased endurance and activity tolerance secondary to acute respiratory failure. At this time, SpO2 desaturation with need for increased O2 demand is her primary limiting factor during all mobility and activities that require minimal-moderate exertion (I.e. toilet transfers). Anticipate safe and successful return to PLOF with medical management of her respiratory symptoms. No further OT indicated at this time. Safety Interventions: patient left in bed and nurse notified--sitting EOB    Plan  Frequency: Eval with same day discharge. No follow up required. Current Treatment Recommendations: not applicable, evaluation completed with same day discharge. Goals  Patient Goals: Return to home   Short Term Goals:  Time Frame: d/c  Patient eval with same day discharge. No goals set secondary to pt independent with ADLs and funcitonal mobility.      Therapy Session Time     Individual Group Co-treatment   Time In    4570 (8384)   Time Out    1201 (1318)   Minutes    24 (40)        Timed Code Treatment Minutes:  49 Minutes    Total Treatment Minutes:  24 + 10 = 64 minutes    Electronically Signed By: MAXIM Marsh OTR/L  [id number]

## 2022-05-05 NOTE — PROGRESS NOTES
Physician Progress Note      PATIENT:               Corby Gill  CSN #:                  [id number]  :                       1970  ADMIT DATE:       2022 12:18 PM  [address] [place] DATE:  RESPONDING  PROVIDER #:        Yeimy Guallpa MD          QUERY TEXT:    Patient admitted with BMI 75.98. If possible, please document in progress   notes and discharge summary if you are evaluating and /or treating any of the   following: The medical record reflects the following:  Risk Factors: Hx. DM  Clinical Indicators: Admission weight 449 pounds, BMI 75.98  Treatment: Patient is on Carb Control Diet  Options provided:  -- Obesity  -- Morbid obesity  -- Overweight  -- Other - I will add my own diagnosis  -- Disagree - Not applicable / Not valid  -- Disagree - Clinically unable to determine / Unknown  -- Refer to Clinical Documentation Reviewer    PROVIDER RESPONSE TEXT:    This patient has morbid obesity.     Query created by: Zhanna Cruz on 2022 10:03 AM      Electronically signed by:  Yeimy Guallpa MD 2022 10:57 AM

## 2022-05-05 NOTE — PROGRESS NOTES
Shift assessment complete. VSS. Pt is resting comfortably in bed. Pt resting on 2L O2 currently. Will wean as tolerated. POC discussed with patient and patient states understanding and is in agreement with plan. Receiving IVP solu-medrol. Call light and bedside table within reach. No further needs expressed at this time.

## 2022-05-05 NOTE — H&P
HOSPITALISTS HISTORY AND PHYSICAL    5/4/2022 9:38 PM    Patient Information:  Sandy Fox is a 46 y.o. female [id number]  PCP:  Katia Rodriguez (Tel: [phone] )    Chief complaint:    Chief Complaint   Patient presents with    Shortness of Breath     cough and laryngitis x 1 month, shortness of breath x 3-4 days. Hx asthma and CHF - no rescue inhaler used due to being out of meds      History of Present Illness:  Rosa Isela Rodriguez is a 46 y.o. female with h/o Asthma , DM presented with c/o dyspnea , cough, and sore throat on going for the past month. She has been using inhalers at home. But symptoms continue to worsen . She does not wear O2 . Found to be hypoxic in the eD   sats in high 80's placed on 2 L O2 via NC . Chest CT is neg for pneumonia or edema. She has been given breathing treatments and IV Decadron       REVIEW OF SYSTEMS:   Constitutional: Negative for fever,chills or night sweats  ENT: Negative for rhinorrhea, epistaxis, hoarseness, sore throat. Respiratory: +Ve  for shortness of breath,wheezing, cough   Cardiovascular: Negative for chest pain, palpitations   Gastrointestinal: Negative for nausea, vomiting, diarrhea  Genitourinary: Negative for polyuria, dysuria   Hematologic/Lymphatic: Negative for bleeding tendency, easy bruising  Musculoskeletal: Negative for myalgias and arthralgias  Neurologic: Negative for confusion,dysarthria. Skin: Negative for itching,rash  Psychiatric: Negative for depression,anxiety, agitation. Endocrine: Negative for polydipsia,polyuria,heat /cold intolerance.     Past Medical History:   has a past medical history of Acute on chronic combined systolic and diastolic CHF (congestive heart failure) ([facility] Asthma, Cardiomyopathy ([facility] [address] Diabetes mellitus ([facility] [address] Heart murmur, Hypertension, Obstructive sleep apnea (adult) (pediatric), Sleep apnea, and Urinary incontinence. Past Surgical History:   has no past surgical history on file. Medications:  No current facility-administered medications on file prior to encounter. Current Outpatient Medications on File Prior to Encounter   Medication Sig Dispense Refill    vitamin D (ERGOCALCIFEROL) 1.25 MG (97192 UT) CAPS capsule TAKE 1 CAPSULE BY MOUTH 1 TIME A WEEK (Patient taking differently: once a week ) 12 capsule 1    furosemide (LASIX) 40 MG tablet TAKE 2 TABLETS BY MOUTH TWICE DAILY (Patient taking differently: 80 mg Monday-Friday 80mg in afternoon  Saturday, Sunday 80mg BID) 360 tablet 3    spironolactone (ALDACTONE) 25 MG tablet TAKE 1 TABLET BY MOUTH DAILY 90 tablet 1    carvedilol (COREG) 25 MG tablet TAKE 1 TABLET BY MOUTH TWICE DAILY (Patient taking differently: daily ) 180 tablet 1    ferrous sulfate (IRON 325) 325 (65 Fe) MG tablet Take 325 mg by mouth 2 times daily      irbesartan (AVAPRO) 75 MG tablet Take 1 tablet by mouth daily (Patient taking differently: Take 75 mg by mouth at bedtime ) 90 tablet 3    atorvastatin (LIPITOR) 40 MG tablet Take 1 tablet by mouth daily 90 tablet 3    aspirin 81 MG chewable tablet Chew and swallow 1 tablet by mouth daily  90 tablet 2    glyBURIDE (DIABETA) 2.5 MG tablet TK 1 T PO D  5    Alpha-D-Galactosidase (BEANO MELTAWAYS) TABS Take 1 tablet by mouth      albuterol sulfate  (90 Base) MCG/ACT inhaler Inhale 2 puffs into the lungs      ketoconazole (NIZORAL) 2 % cream Apply topically daily as needed Apply topically daily.         Current Facility-Administered Medications   Medication Dose Route Frequency Provider Last Rate Last Admin    ipratropium-albuterol (DUONEB) nebulizer solution 1 ampule  1 ampule Inhalation Q4H WA Corie Sams MD   1 ampule at 05/04/22 2134    [START ON 5/5/2022] methylPREDNISolone sodium (SOLU-MEDROL) injection 40 mg  40 mg IntraVENous Daily Corie Sams MD        aspirin chewable tablet 81 mg  81 mg Oral Daily Lizette Tasha Fajardo MD        carvedilol (COREG) tablet 25 mg  25 mg Oral BID Izora Counter, MD        furosemide (LASIX) tablet 40 mg  40 mg Oral Daily Izora Counter, MD        losartan (COZAAR) tablet 25 mg  25 mg Oral Daily Izora Counter, MD        spironolactone (ALDACTONE) tablet 25 mg  25 mg Oral Daily Izora Counter, MD Lam Sky ON 5/5/2022] glipiZIDE (GLUCOTROL) tablet 2.5 mg  2.5 mg Oral QAM AC Izora Counter, MD        sodium chloride flush 0.9 % injection 5-40 mL  5-40 mL IntraVENous 2 times per day Izora Counter, MD        sodium chloride flush 0.9 % injection 5-40 mL  5-40 mL IntraVENous PRN Izora Counter, MD        0.9 % sodium chloride infusion   IntraVENous PRN Izora Counter, MD        enoxaparin (LOVENOX) injection 60 mg  60 mg SubCUTAneous BID Izora Counter, MD   60 mg at 05/04/22 2041    ondansetron (ZOFRAN-ODT) disintegrating tablet 4 mg  4 mg Oral Q8H PRN Izora Counter, MD        Or    ondansetron (ZOFRAN) injection 4 mg  4 mg IntraVENous Q6H PRN Izora Counter, MD        polyethylene glycol (GLYCOLAX) packet 17 g  17 g Oral Daily PRN Izora Counter, MD        acetaminophen (TYLENOL) tablet 650 mg  650 mg Oral Q6H PRN Izora Counter, MD Jose F Lara acetaminophen (TYLENOL) suppository 650 mg  650 mg Rectal Q6H PRN Izora Counter, MD        glucose (GLUTOSE) 40 % oral gel 15 g  15 g Oral PRN Izora Counter, MD        dextrose 50 % IV solution  12.5 g IntraVENous PRN Izora Counter, MD        glucagon (rDNA) injection 1 mg  1 mg IntraMUSCular PRN Izora Counter, MD        dextrose 5 % solution  100 mL/hr IntraVENous PRN Izora Counter, MD Lam Sky ON 5/5/2022] insulin lispro (HUMALOG) injection vial 0-12 Units  0-12 Units SubCUTAneous TID  Lizette Thomas MD        insulin lispro (HUMALOG) injection vial 0-6 Units  0-6 Units SubCUTAneous Nightly Izora Counter, MD         Allergies:   Allergies   Allergen Reactions    Levofloxacin Other (See Comments)     headache        Social History:  Patient Lives reports that she has never smoked. She has never used smokeless tobacco. She reports that she does not drink alcohol and does not use drugs. Family History:  family history includes Arthritis in her father and mother; Breast Cancer in her maternal aunt; Cancer in her maternal grandmother and maternal uncle; Diabetes in her sister; High Blood Pressure in her father, mother, and sister; Hypertension in her father, maternal uncle, mother, and sister; Other in her mother; Stroke in her father. ,     Physical Exam:  BP (!) 144/69   Pulse 71   Temp 99.1 °F (37.3 °C) (Oral)   Resp 20   Ht 5' 4.5\" (1.638 m)   Wt (!) 443 lb (200.9 kg)   SpO2 95%   BMI 74.87 kg/m²     General appearance:  Appears comfortable. Well nourished  Eyes: Sclera clear, pupils equal  ENT: Moist mucus membranes, no thrush. Trachea midline. Cardiovascular: Regular rhythm, normal S1, S2. No murmur, gallop, rub. No edema in lower extremities  Respiratory: Clear to auscultation bilaterally, no wheeze, good inspiratory effort  Gastrointestinal: Abdomen soft, non-tender, not distended, normal bowel sounds  Musculoskeletal: No cyanosis in digits, neck supple  Neurology: Cranial nerves grossly intact. Alert and oriented in time, place and person. No speech or motor deficits  Psychiatry: Appropriate affect.  Not agitated  Skin: Warm, dry, normal turgor, no rash  Brisk capillary refill, peripheral pulses palpable   Labs:  CBC:   Lab Results   Component Value Date    WBC 6.1 05/04/2022    RBC 5.05 05/04/2022    HGB 13.1 05/04/2022    HCT 42.7 05/04/2022    MCV 84.6 05/04/2022    MCH 25.9 05/04/2022    MCHC 30.6 05/04/2022    RDW 16.8 05/04/2022     05/04/2022    MPV 8.5 05/04/2022     BMP:    Lab Results   Component Value Date     05/04/2022    K 4.0 05/04/2022    CL 96 05/04/2022    CO2 33 05/04/2022    BUN 19 05/04/2022    CREATININE 1.2 05/04/2022    CALCIUM 9.8 05/04/2022    GFRAA 57 05/04/2022    LABGLOM 47 05/04/2022    GLUCOSE 174 05/04/2022     CT CHEST PULMONARY EMBOLISM W CONTRAST   Final Result   1. No acute abnormality. XR CHEST PORTABLE   Final Result   Pulmonary vascular congestion versus reactive airway disease. Chest Xray:   EKG:    I visualized CXR images and EKG strips    Discussed case  with     Problem List  Principal Problem:    Acute respiratory failure ([address]  Resolved Problems:    * No resolved hospital problems. *        Assessment/Plan:   Acute respiratory failure with hypoxia   On 2 L o2 vi aNC     Asthma exacerbation   Cont breathing tx   Cont IV Solumedrol   Chest CT is neg for pneumonia  COVID and influenza Screen tested neg    Type 2 Dm with hyperglycemia   Carb control diet   And Sliding scale insulin     DVT prophylaxis   Code status   Diet   IV access   Guerrero Catheter    Admit as inpatient. I anticipate hospitalization spanning more than two midnights for investigation and treatment of the above medically necessary diagnoses. Please note that some part of this chart was generated using Dragon dictation software. Although every effort was made to ensure the accuracy of this automated transcription, some errors in transcription may have occurred inadvertently. If you may need any clarification, please do not hesitate to contact me through [facility].        Gracia Acosta MD    5/4/2022 9:38 PM

## 2022-05-05 NOTE — PLAN OF CARE
Problem: Discharge Planning  Goal: Discharge to home or other facility with appropriate resources  5/5/2022 1013 by Kevan Delgadillo RN  Flowsheets (Taken 5/5/2022 1013)  Discharge to home or other facility with appropriate resources: Identify barriers to discharge with patient and caregiver  Note: PT/OT ordered and will evaluate pt today.   5/5/2022 1012 by Kevan Delgadillo RN  Outcome: Progressing  5/5/2022 1012 by Kevan Delgadillo RN  Outcome: Progressing     Problem: Pain  Goal: Verbalizes/displays adequate comfort level or baseline comfort level  5/5/2022 1012 by Kevan Delgadillo RN  Outcome: Progressing  5/5/2022 1012 by Kevan Delgadillo RN  Outcome: Progressing     Problem: Safety - Adult  Goal: Free from fall injury  5/5/2022 1013 by Kevan Delgadillo RN  Flowsheets (Taken 5/5/2022 1013)  Free From Fall Injury:   Instruct family/caregiver on patient safety   Based on caregiver fall risk screen, instruct family/caregiver to ask for assistance with transferring infant if caregiver noted to have fall risk factors  5/5/2022 1012 by Kevan Delgadillo RN  Outcome: Progressing  5/5/2022 1012 by Kevan Delgadillo RN  Outcome: Progressing     Problem: ABCDS Injury Assessment  Goal: Absence of physical injury  5/5/2022 1013 by Kevan Delgadillo RN  Flowsheets (Taken 5/5/2022 1013)  Absence of Physical Injury: Implement safety measures based on patient assessment  5/5/2022 1012 by Kevan Delgadillo RN  Outcome: Progressing  5/5/2022 1012 by Kevan Delgadillo RN  Outcome: Progressing     Problem: Chronic Conditions and Co-morbidities  Goal: Patient's chronic conditions and co-morbidity symptoms are monitored and maintained or improved  5/5/2022 1013 by Kevan Delgadillo RN  Flowsheets (Taken 5/5/2022 1013)  Care Plan - Patient's Chronic Conditions and Co-Morbidity Symptoms are Monitored and Maintained or Improved:   Monitor and assess patient's chronic conditions and comorbid symptoms for stability, deterioration, or improvement Collaborate with multidisciplinary team to address chronic and comorbid conditions and prevent exacerbation or deterioration   Update acute care plan with appropriate goals if chronic or comorbid symptoms are exacerbated and prevent overall improvement and discharge  5/5/2022 1012 by Adina Snellen, RN  Outcome: Progressing  5/5/2022 1012 by Adina Snellen, RN  Outcome: Progressing     Problem: Respiratory - Adult  Goal: Achieves optimal ventilation and oxygenation  Outcome: Progressing  Flowsheets (Taken 5/5/2022 1012)  Achieves optimal ventilation and oxygenation: Oxygen supplementation based on oxygen saturation or arterial blood gases  Note: Currently on 2L O2. Will wean as tolerated.

## 2022-05-05 NOTE — PROGRESS NOTES
[facility] [address]   Phone: [phone]    Physical Therapy    [x] Initial Evaluation            [] Daily Treatment Note         [x] Discharge Summary      Patient: Betsy Palacios   : 1970   MRN: [id number]   Date of Service:  2022  Admitting Diagnosis: Acute respiratory failure [facility])  Current Admission Summary: Per H&P on  \"44 y.o. female with h/o Asthma , DM presented with c/o dyspnea , cough, and sore throat on going for the past month. She has been using inhalers at home. But symptoms continue to worsen . She does not wear O2 . Found to be hypoxic in the ED. Sats in high 80's placed on 2 L O2 via NC . Chest CT is neg for pneumonia or edema. She has been given breathing treatments and IV Decadron \"  Past Medical History:  has a past medical history of Acute on chronic combined systolic and diastolic CHF (congestive heart failure) ([address] Asthma, Cardiomyopathy ([address] Diabetes mellitus ([address] Heart murmur, Hypertension, Obstructive sleep apnea (adult) (pediatric), Sleep apnea, and Urinary incontinence. Past Surgical History:  has no past surgical history on file. Discharge Recommendations: Betsy Palacios scored a 24/24 on the AM-PAC short mobility form. At this time, no further PT is recommended upon discharge due to the patient functioning near baseline for mobility and ADLs. Recommend patient returns to prior setting with prior services. DME Required For Discharge: no DME required at discharge, other: pt has shower chair and standard walker     Precautions/Restrictions: high fall risk  Weight Bearing Restrictions: no restrictions      Required Braces/Orthotics: no braces required     Positional Restrictions:no positional restrictions    Pre-Admission Information   Lives With: alone                     Type of Home: apartment, .   Comment: [facility]  Home Layout: two level, 13 stairs to 2nd level with R HR  Home Access: level entry, 1 step to enter without rails   Bathroom Layout: walk in shower  Toilet Height: standard height  Bathroom Equipment: . Comment: Pt has bench seat but does not use unless washing hair. Typically steps into shower to rinse off breifly, then walks back to bed to complete bathing with soap, then walks back to shower to rinse off. Pt reports her shower is \"too tight\" to remain sitting in cubical.   Home Equipment: standard walker  Transfer Assistance: Independent without use of device  Ambulation Assistance:Independent without use of device  ADL Assistance: independent with all ADL's  IADL Assistance: independent with homemaking tasks, completes most ADLs/IADLs while sitting in an \"office chair\"  Active :        [x]? Yes                 []? No  Hand Dominance: []? Left                 [x]? Right  Current Employment: part time employment. 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 Floor was wet and slipped. Pt indicating no fractures, just bruising and soreness. Examination   Vision:   Vision Gross Assessment: Impaired and Vision Corrective Device: wears contacts  Hearing:   WFL  Observation:   General Observation:  pt noted to have shallow breathes and difficulty breathing while talking or eating  Posture:   Good  Sensation:   WFL    ROM:   (B) LE ROM WFL  Strength:   (B) LE strength grossly 3/5 as observed through functional mobility  Decision Making: low complexity  Clinical Presentation: stable      Subjective  General: Pt sitting EOB upon therapist arrival. Pt agreeable completing subjective interview while eating lunch but then requests time for food to settle before ambulation. Therapy returned for functional mobility assessment. Pain: 4/10.   Location: Chest when coughing or taking a deep breath  Pain Interventions: patient denies pain interventions       Functional Mobility  Bed Mobility  Scooting: Independent  Comments: further bed mobility not completed as pt sitting EOB at beginning/end of both sessions  Transfers  Sit to stand transfer: Independent, modified independent, . Comment Indep from EOB x5 trials; Mod I from toilet with use of UE support on grab bar x1, and UE support on door frame x1  Stand to sit transfer: Independent, . Comment To EOB x5, to toilet x2  Comments: Pt noted to hold her breath with toilet transfer, educated on importance of breath control, pt demonstrated improvement with 2nd toilet transfer  Ambulation  Surface:level surface  Assistive Device: no device  Assistance: Independent  Distance: 55 + 50 + 50 + 50 + 12 + 12 ft  Gait Mechanics: Increased ROSELYN, increased medial/lateral sway, decreased step length, heel-toe contact, no LOB  Comments: Pt completed multiple ambulation trials in room for home O2 test.   1st ambulation on 1L (55 ft w/ toilet transfer), pt dropped to 84%, required 1 min 30 sec to recover. 2nd ambulation on 1L (50 ft w/o toilet transfer), pt dropped to 88%, required 40 sec to recover with PLB  3rd ambulation on RA (50 ft), pt dropped to 85%, recovered in 1 min 10 sec  4th ambulation on 2 L (50 ft), pt dropped to 89-90% with 88% showing for 1 sec while pt talking during recovery, immediately 90% once pt stops talking and focuses on PLB  5th ambulation (12+12 ft with toilet transfer) on 2L, pt maintained 90%  Stair Mobility  Stair mobility not completed on this date.   Comments:  Balance  Static Sitting Balance: good(+): independent with high level dynamic balance in unsupported position  Dynamic Sitting Balance: good(+): independent with high level dynamic balance in unsupported position  Static Standing Balance: good: independent with functional balance in unsupported position  Dynamic Standing Balance: good: independent with functional balance in unsupported position  Comments: No LOB with seated or standing activities    Other Therapeutic Interventions  N/A  Functional Outcomes  AM-PAC Inpatient Mobility Raw Score : 24              Cognition  WFL  Orientation:    alert and oriented x 4  Command Following:   [facility]    Education  Barriers To Learning: none  Patient Education: patient educated on goals, PT role and benefits, plan of care, general safety, energy conservation, discharge recommendations, safe O2 line management for home, wearing O2 with activity/ADLs, monitoring home O2  Learning Assessment:  patient verbalizes and demonstrates understanding    Assessment  Activity Tolerance: Pt required 2-3 minute seated rest breaks between all bouts of activity. Per pt this is near her baseline. See Ambulation for O2 tolerance. Impairments Requiring Therapeutic Intervention: none - eval with same day discharge  Prognosis: fair  Clinical Assessment: Pt is a 47 yo female admitted to [facility] for increased SOB. At this time the patient is functioning near her baseline with functional mobility but requires increased supplemental O2 from baseline (RN and RT aware). Pt has been on O2 before and verbalized understanding of safe O2 line management, energy conservation, and O2 monitoring. No further acute PT needs identified at this time. Will discharge. Safety Interventions: patient left in bed, call light within reach, nurse notified and patient up ad darek     Plan  Frequency: Eval with same day discharge. No follow up required. Current Treatment Recommendations: not applicable, evaluation completed with same day discharge. Goals  Patient Goals: Go home feeling better   No acute PT goals, pt functioning near baseline of independent.     Therapy Session Time      Individual Group Co-treatment   Time In     6585 (8485)   Time Out     1201 (1318)   Minutes     24 (40)     Timed Code Treatment Minutes:  49 Minutes  Total Treatment Minutes:  24 + 10 = 64 minutes       Electronically Signed By: Taiwo Coley, PT       Fannie Burks PT, DPT #[id number]

## 2022-05-05 NOTE — PLAN OF CARE
Problem: Discharge Planning  Goal: Discharge to home or other facility with appropriate resources  5/5/2022 1244 by Meli Martin RN  Outcome: Progressing  Flowsheets (Taken 5/5/2022 1236)  Discharge to home or other facility with appropriate resources: Identify barriers to discharge with patient and caregiver  5/5/2022 1013 by Olivia Soriano RN  Flowsheets (Taken 5/5/2022 1013)  Discharge to home or other facility with appropriate resources: Identify barriers to discharge with patient and caregiver  Note: PT/OT ordered and will evaluate pt today.   5/5/2022 1012 by Olivia Soriano RN  Outcome: Progressing  5/5/2022 1012 by Olivia Soriano RN  Outcome: Progressing     Problem: Pain  Goal: Verbalizes/displays adequate comfort level or baseline comfort level  5/5/2022 1244 by Meli Martin RN  Outcome: Progressing  5/5/2022 1012 by Olivia Soriano RN  Outcome: Progressing  5/5/2022 1012 by Olivia Soriano RN  Outcome: Progressing     Problem: Safety - Adult  Goal: Free from fall injury  5/5/2022 1244 by Meli Martin RN  Outcome: Progressing  Flowsheets (Taken 5/5/2022 1243)  Free From Fall Injury: Instruct family/caregiver on patient safety  5/5/2022 1013 by Olivia Soriano RN  Flowsheets (Taken 5/5/2022 1013)  Free From Fall Injury:   Instruct family/caregiver on patient safety   Based on caregiver fall risk screen, instruct family/caregiver to ask for assistance with transferring infant if caregiver noted to have fall risk factors  5/5/2022 1012 by Olivia Soriano RN  Outcome: Progressing  5/5/2022 1012 by Olivia Soriano RN  Outcome: Progressing     Problem: ABCDS Injury Assessment  Goal: Absence of physical injury  5/5/2022 1244 by Meli Martin RN  Outcome: Progressing  Flowsheets (Taken 5/5/2022 1243)  Absence of Physical Injury: Implement safety measures based on patient assessment  5/5/2022 1013 by Olivia Soriano RN  Flowsheets (Taken 5/5/2022 1013)  Absence of Physical Injury: Implement safety measures based on patient assessment  5/5/2022 1012 by Soto Gonzalez RN  Outcome: Progressing  5/5/2022 1012 by Soto Gonzalez RN  Outcome: Progressing     Problem: Chronic Conditions and Co-morbidities  Goal: Patient's chronic conditions and co-morbidity symptoms are monitored and maintained or improved  5/5/2022 1244 by Tatiana Rosas RN  Outcome: Progressing  Flowsheets (Taken 5/5/2022 1236)  Care Plan - Patient's Chronic Conditions and Co-Morbidity Symptoms are Monitored and Maintained or Improved: Monitor and assess patient's chronic conditions and comorbid symptoms for stability, deterioration, or improvement  5/5/2022 1013 by Soto Gonzalez RN  Flowsheets (Taken 5/5/2022 1013)  Care Plan - Patient's Chronic Conditions and Co-Morbidity Symptoms are Monitored and Maintained or Improved:   Monitor and assess patient's chronic conditions and comorbid symptoms for stability, deterioration, or improvement   Collaborate with multidisciplinary team to address chronic and comorbid conditions and prevent exacerbation or deterioration   Update acute care plan with appropriate goals if chronic or comorbid symptoms are exacerbated and prevent overall improvement and discharge  5/5/2022 1012 by Soto Gonzalez RN  Outcome: Progressing  5/5/2022 1012 by Soto Gonzalez RN  Outcome: Progressing     Problem: Respiratory - Adult  Goal: Achieves optimal ventilation and oxygenation  5/5/2022 1244 by Tatiana Rosas RN  Outcome: Progressing  5/5/2022 1012 by Soto Gonzalez RN  Outcome: Progressing  Flowsheets (Taken 5/5/2022 1012)  Achieves optimal ventilation and oxygenation: Oxygen supplementation based on oxygen saturation or arterial blood gases  Note: Currently on 2L O2. Will wean as tolerated.      Tatiana GOYAL, RN   [phone]

## 2022-05-05 NOTE — PROGRESS NOTES
05/05/22 1636   Resting (Room Air)   SpO2 93   HR 94   During Walk (Room Air)   SpO2 84   HR 95   Walk/Assistance Device Ambulation   Rate of Dyspnea 1   During Walk (On O2)   SpO2 88   HR 95   O2 Device Nasal cannula   O2 Flow Rate (l/min) 1 l/min   Need Additional O2 Flow Rate Rows Yes   O2 Flow Rate (l/min) 2 l/min   O2 Saturation 90   Walk/Assistance Device Ambulation   Rate of Dyspnea 1   After Walk   SpO2 93   HR 95   O2 Flow Rate (l/min) 1 l/min   Rate of Dyspnea 0   Does the Patient Qualify for Home O2 Yes   Liter Flow on Exertion 2   Does the Patient Need Portable Oxygen Tanks Yes

## 2022-05-06 VITALS
DIASTOLIC BLOOD PRESSURE: 59 MMHG | WEIGHT: 293 LBS | SYSTOLIC BLOOD PRESSURE: 111 MMHG | TEMPERATURE: 97.9 F | HEIGHT: 65 IN | RESPIRATION RATE: 20 BRPM | OXYGEN SATURATION: 92 % | HEART RATE: 97 BPM | BODY MASS INDEX: 48.82 KG/M2

## 2022-05-06 LAB
ANION GAP SERPL CALCULATED.3IONS-SCNC: 9 MMOL/L (ref 3–16)
ANION GAP SERPL CALCULATED.3IONS-SCNC: 9 MMOL/L (ref 3–16)
BUN BLDV-MCNC: 31 MG/DL (ref 7–20)
BUN BLDV-MCNC: 32 MG/DL (ref 7–20)
CALCIUM SERPL-MCNC: 9.8 MG/DL (ref 8.3–10.6)
CALCIUM SERPL-MCNC: 9.8 MG/DL (ref 8.3–10.6)
CHLORIDE BLD-SCNC: 100 MMOL/L (ref 99–110)
CHLORIDE BLD-SCNC: 97 MMOL/L (ref 99–110)
CO2: 30 MMOL/L (ref 21–32)
CO2: 31 MMOL/L (ref 21–32)
CREAT SERPL-MCNC: 1.1 MG/DL (ref 0.6–1.1)
CREAT SERPL-MCNC: 1.4 MG/DL (ref 0.6–1.1)
GFR AFRICAN AMERICAN: 47
GFR AFRICAN AMERICAN: >60
GFR NON-AFRICAN AMERICAN: 39
GFR NON-AFRICAN AMERICAN: 52
GLUCOSE BLD-MCNC: 159 MG/DL (ref 70–99)
GLUCOSE BLD-MCNC: 183 MG/DL (ref 70–99)
GLUCOSE BLD-MCNC: 191 MG/DL (ref 70–99)
GLUCOSE BLD-MCNC: 210 MG/DL (ref 70–99)
GLUCOSE BLD-MCNC: 218 MG/DL (ref 70–99)
HCT VFR BLD CALC: 39.3 % (ref 36–48)
HEMOGLOBIN: 12 G/DL (ref 12–16)
MCH RBC QN AUTO: 26.4 PG (ref 26–34)
MCHC RBC AUTO-ENTMCNC: 30.6 G/DL (ref 31–36)
MCV RBC AUTO: 86.4 FL (ref 80–100)
PDW BLD-RTO: 16.8 % (ref 12.4–15.4)
PERFORMED ON: ABNORMAL
PLATELET # BLD: 221 K/UL (ref 135–450)
PMV BLD AUTO: 8.8 FL (ref 5–10.5)
POTASSIUM REFLEX MAGNESIUM: 4.7 MMOL/L (ref 3.5–5.1)
POTASSIUM SERPL-SCNC: 5.8 MMOL/L (ref 3.5–5.1)
RBC # BLD: 4.54 M/UL (ref 4–5.2)
SODIUM BLD-SCNC: 136 MMOL/L (ref 136–145)
SODIUM BLD-SCNC: 140 MMOL/L (ref 136–145)
WBC # BLD: 7.8 K/UL (ref 4–11)

## 2022-05-06 PROCEDURE — 85027 COMPLETE CBC AUTOMATED: CPT

## 2022-05-06 PROCEDURE — 6360000002 HC RX W HCPCS: Performed by: PHYSICIAN ASSISTANT

## 2022-05-06 PROCEDURE — 6370000000 HC RX 637 (ALT 250 FOR IP): Performed by: FAMILY MEDICINE

## 2022-05-06 PROCEDURE — 6360000002 HC RX W HCPCS: Performed by: FAMILY MEDICINE

## 2022-05-06 PROCEDURE — 80048 BASIC METABOLIC PNL TOTAL CA: CPT

## 2022-05-06 PROCEDURE — 36415 COLL VENOUS BLD VENIPUNCTURE: CPT

## 2022-05-06 PROCEDURE — 2580000003 HC RX 258: Performed by: FAMILY MEDICINE

## 2022-05-06 PROCEDURE — 94761 N-INVAS EAR/PLS OXIMETRY MLT: CPT

## 2022-05-06 PROCEDURE — 94640 AIRWAY INHALATION TREATMENT: CPT

## 2022-05-06 PROCEDURE — 2700000000 HC OXYGEN THERAPY PER DAY

## 2022-05-06 PROCEDURE — 6370000000 HC RX 637 (ALT 250 FOR IP): Performed by: PHYSICIAN ASSISTANT

## 2022-05-06 RX ORDER — IPRATROPIUM BROMIDE AND ALBUTEROL SULFATE 2.5; .5 MG/3ML; MG/3ML
3 SOLUTION RESPIRATORY (INHALATION)
Qty: 360 ML | Refills: 1 | Status: SHIPPED | OUTPATIENT
Start: 2022-05-06

## 2022-05-06 RX ORDER — SPIRONOLACTONE 25 MG/1
25 TABLET ORAL DAILY
Qty: 90 TABLET | Refills: 1
Start: 2022-05-07 | End: 2022-05-09

## 2022-05-06 RX ORDER — BLOOD-GLUCOSE METER
1 KIT MISCELLANEOUS DAILY
Qty: 1 KIT | Refills: 0 | Status: SHIPPED | OUTPATIENT
Start: 2022-05-06

## 2022-05-06 RX ORDER — PREDNISONE 20 MG/1
40 TABLET ORAL DAILY
Qty: 10 TABLET | Refills: 0 | Status: SHIPPED | OUTPATIENT
Start: 2022-05-07 | End: 2022-05-12

## 2022-05-06 RX ADMIN — INSULIN LISPRO 2 UNITS: 100 INJECTION, SOLUTION INTRAVENOUS; SUBCUTANEOUS at 09:49

## 2022-05-06 RX ADMIN — INSULIN LISPRO 2 UNITS: 100 INJECTION, SOLUTION INTRAVENOUS; SUBCUTANEOUS at 12:30

## 2022-05-06 RX ADMIN — SODIUM ZIRCONIUM CYCLOSILICATE 10 G: 5 POWDER, FOR SUSPENSION ORAL at 09:43

## 2022-05-06 RX ADMIN — ENOXAPARIN SODIUM 60 MG: 60 INJECTION SUBCUTANEOUS at 09:43

## 2022-05-06 RX ADMIN — IPRATROPIUM BROMIDE AND ALBUTEROL SULFATE 1 AMPULE: .5; 3 SOLUTION RESPIRATORY (INHALATION) at 07:37

## 2022-05-06 RX ADMIN — Medication 10 ML: at 10:48

## 2022-05-06 RX ADMIN — IPRATROPIUM BROMIDE AND ALBUTEROL SULFATE 1 AMPULE: .5; 3 SOLUTION RESPIRATORY (INHALATION) at 15:34

## 2022-05-06 RX ADMIN — IPRATROPIUM BROMIDE AND ALBUTEROL SULFATE 1 AMPULE: .5; 3 SOLUTION RESPIRATORY (INHALATION) at 11:22

## 2022-05-06 RX ADMIN — METHYLPREDNISOLONE SODIUM SUCCINATE 40 MG: 40 INJECTION, POWDER, FOR SOLUTION INTRAMUSCULAR; INTRAVENOUS at 09:43

## 2022-05-06 RX ADMIN — GLIPIZIDE 2.5 MG: 5 TABLET ORAL at 09:45

## 2022-05-06 ASSESSMENT — PAIN SCALES - GENERAL
PAINLEVEL_OUTOF10: 0

## 2022-05-06 NOTE — PROGRESS NOTES
Data- discharge order received, pt verbalized agreement to discharge, needs for  new Durable Medical Equipment through [employer] for nebulizer and O2 oxygenator. Respirator therapist Hosea Kay kindly showed pt how to use each and pt left on oxygenator 2L, JULIA reviewed and signed by MD, to be completed by RN. Pt received blood glucose monitoring kit said she has used on in the past and knew how to use it already. Action- AVS prepared, discharge instructions prepared and given to pt, medication information packet given r/t NEW or CHANGED prescriptions, pt verbalized understanding further self-review. D/C instruction summary: Diet- carb con, Activity- as paco, follow up with Primary Care Physician William Christina [phone] appointment , immunizations reviewed, medications prescriptions to be filled meds to beds. Inpatient surgical procedural reviewed: none. Contact information provided to above agencies used. Response- Case Management/ reported faxing completed JULIA and AVS to needed HHC/DME services stated above. Pt belongings gathered, IV removed, pt dressed . Disposition is home with HHC/DME as stated above, transported with self, taken to lobby via w/c with this nurse, no complications. 1. WEIGHT: Admit Weight: (!) 443 lb (200.9 kg) (05/04/22 1225)        Today  Weight: (!) 451 lb 4.8 oz (204.7 kg) (05/06/22 0428)       2.  O2 SAT.: SpO2: 92 % (05/06/22 1538)

## 2022-05-06 NOTE — CARE COORDINATION
Order noted for Home O2 and Nebulizer. SW/KRISTYN met with patient. Patient agreeable with the recommendation for Home O2 and a nebulizer. The Plan for Transition of Care is related to the following treatment goals: To increase patient's independence. The Patient  was provided with a choice of provider and agrees with the discharge plan. [x] Yes [] No    Freedom of choice list was provided with basic dialogue that supports the patient's individualized plan of care/goals, treatment preferences and shares the quality data associated with the providers. [x] Yes [] No    Referral made to [facility].     Electronically signed by NARESH Stoll on 5/6/2022 at 4:47 PM

## 2022-05-06 NOTE — DISCHARGE INSTR - COC
Continuity of Care Form    Patient Name: Whit Mcgrath   :  1970  MRN:  [id number]    Admit date:  2022  Discharge date:  ***    Code Status Order: Full Code   Advance Directives:      Admitting Physician:  Enedina Hogan MD  PCP: Taran Qureshi    Discharging Nurse: [facility] Unit/Room#: 3AN-3302/3302-01  Discharging Unit Phone Number: ***    Emergency Contact:   Extended Emergency Contact Information  Primary Emergency Contact: [address] Phone: [phone]  Relation: Parent  Secondary Emergency Contact: Other,Other  Relation: Other    Past Surgical History:  History reviewed. No pertinent surgical history. Immunization History: There is no immunization history on file for this patient.     Active Problems:  Patient Active Problem List   Diagnosis Code    Shortness of breath R06.02    Chronic combined systolic and diastolic CHF (congestive heart failure) ([facility]) I50.42    Essential hypertension I10    Morbid obesity with BMI of 70 and over, adult ([facility] E66.01, Z68.45    Obstructive sleep apnea (adult) (pediatric) G47.33    Acute pulmonary edema (HCC) O28.3    Acute systolic heart failure (HCC) I50.21    Cardiomyopathy ([facility] I42.9    Diabetes mellitus ([facility] E11.9    Hypersomnia G47.10    Diabetes mellitus type 2 in obese (HCC) E11.69, E66.9    Mixed hyperlipidemia E78.2    Acute respiratory failure ([facility]) J96.00       Isolation/Infection:   Isolation            No Isolation          Patient Infection Status       Infection Onset Added Last Indicated Last Indicated By Review Planned Expiration Resolved Resolved By    None active    Resolved    COVID-19 (Rule Out) 22 COVID-19 & Influenza Combo (Ordered)   22 Rule-Out Test Resulted            Nurse Assessment:  Last Vital Signs: BP (!) 111/59   Pulse 97   Temp 97.9 °F (36.6 °C) (Oral)   Resp 20   Ht 5' 4.5\" (1.638 m)   Wt (!) 451 lb 4.8 oz (204.7 kg)   SpO2 92%   BMI 76.27 kg/m²     Last documented pain score (0-10 scale): Pain Level: 0  Last Weight:   Wt Readings from Last 1 Encounters:   22 (!) 451 lb 4.8 oz (204.7 kg)     Mental Status:  {IP PT MENTAL STATUS:}    IV Access:  { JULIA IV ACCESS:334370968}    Nursing Mobility/ADLs:  Walking   {CHP DME OSRX:640469928}  Transfer  {CHP DME DNIQ:159067038}  Bathing  {CHP DME INZ}  Dressing  {CHP DME RVVX:524252088}  Toileting  {CHP DME KEPM:236948364}  Feeding  {CHP DME CLUO:924050329}  Med Admin  {CHP DME XKSH:590346107}  Med Delivery   { JULIA MED Delivery:033364443}    Wound Care Documentation and Therapy:        Elimination:  Continence: Bowel: {YES / IJ:51189}  Bladder: {YES / PA:27241}  Urinary Catheter: {Urinary Catheter:493949693}   Colostomy/Ileostomy/Ileal Conduit: {YES / KEENAN:30668}       Date of Last BM: ***    Intake/Output Summary (Last 24 hours) at 2022 1650  Last data filed at 2022 1237  Gross per 24 hour   Intake 1930 ml   Output 1100 ml   Net 830 ml     I/O last 3 completed shifts:   In: 4671 [P.O.:1080; I.V.:10]  Out: 1100 [Urine:1100]    Safety Concerns:     [address] Safety Concerns:109723765}    Impairments/Disabilities:      [address] Impairments/Disabilities:673713001}    Nutrition Therapy:  Current Nutrition Therapy:   [address] Diet List:169454353}    Routes of Feeding: {CHP DME Other Feedings:233733092}  Liquids: {Slp liquid thickness:74154}  Daily Fluid Restriction: {CHP DME Yes amt example:130840344}  Last Modified Barium Swallow with Video (Video Swallowing Test): {Done Not Done SEYM:373692538}    Treatments at the Time of Hospital Discharge:   Respiratory Treatments: ***  Oxygen Therapy:  {Therapy; copd oxygen:23716}  Ventilator:    { CC Vent RFAH:823697760}    Rehab Therapies: {THERAPEUTIC INTERVENTION:6117513673}  Weight Bearing Status/Restrictions: { CC Weight Bearin}  Other Medical Equipment (for information only, NOT a DME order):  {EQUIPMENT:274787835}  Other Treatments: ***    Patient's personal belongings (please select all that are sent with patient):  {P DME Belongings:642849926}    RN SIGNATURE:  {Esignature:867688674}    CASE MANAGEMENT/SOCIAL WORK SECTION    Inpatient Status Date: 2022    Readmission Risk Assessment Score:11%  Readmission Risk              Risk of Unplanned Readmission:  13           Discharging to Facility/ Agency   Name: [facility]  Phone: [phone]  Fax: [phone]      / signature: Electronically signed by NARESH Cheung on 2022 at 4:53 PM     PHYSICIAN SECTION    Prognosis: {Prognosis:8875472250}    Condition at Discharge: [facility] Cindy Jamey Patient Condition:383817351}    Rehab Potential (if transferring to Rehab): {Prognosis:4296323040}    Recommended Labs or Other Treatments After Discharge: ***    Physician Certification: I certify the above information and transfer of Annetta Mathur  is necessary for the continuing treatment of the diagnosis listed and that she requires {Admit to Appropriate Level of Care:54294} for {GREATER/LESS:194734800} 30 days.      Update Admission H&P: {CHP DME Changes in [facility]}    PHYSICIAN SIGNATURE:  {Esignature:658647972}

## 2022-05-06 NOTE — PROGRESS NOTES
CLINICAL PHARMACY NOTE: MEDS TO BEDS    Total # of Prescriptions Filled: 5   The following medications were delivered to the patient:  · Prednisone 20mg  · Duoneb soln  · Onetouch verio strips  · Onetouch device kit  · Onetouch lancets    Additional Documentation:    LONG Matias approved delivery at 4:06pm    Medications were delivered to patient's room and patient signed for    Shilpi Rivas

## 2022-05-06 NOTE — PLAN OF CARE
Problem: Discharge Planning  Goal: Discharge to home or other facility with appropriate resources  5/5/2022 2353 by Natalia Cantrell RN  Outcome: Progressing  Flowsheets (Taken 5/5/2022 2331)  Discharge to home or other facility with appropriate resources: Identify barriers to discharge with patient and caregiver     Problem: Pain  Goal: Verbalizes/displays adequate comfort level or baseline comfort level  5/5/2022 2353 by Natalia Cantrell RN  Outcome: Progressing  Flowsheets (Taken 5/5/2022 2056)  Verbalizes/displays adequate comfort level or baseline comfort level: Encourage patient to monitor pain and request assistance     Problem: Safety - Adult  Goal: Free from fall injury  5/5/2022 2353 by Natalia Cantrell RN  Outcome: Progressing     Problem: ABCDS Injury Assessment  Goal: Absence of physical injury  5/5/2022 2353 by Natalia Cantrell RN  Outcome: Progressing     Problem: Chronic Conditions and Co-morbidities  Goal: Patient's chronic conditions and co-morbidity symptoms are monitored and maintained or improved  5/5/2022 2353 by Ntaalia Cantrell RN  Outcome: Progressing     Problem: Respiratory - Adult  Goal: Achieves optimal ventilation and oxygenation  5/5/2022 2353 by Natalia Cantrell RN  Outcome: Progressing

## 2022-05-06 NOTE — CARE COORDINATION
SW/KRISTYN contacted [facility] regarding patient's need for Home O2 and nebulizer. [facility] provided DME.     Electronically signed by NARESH Kong on 5/6/2022 at 4:49 PM

## 2022-05-06 NOTE — DISCHARGE SUMMARY
[facility]    Patient Demographics    Patient. Zoë Arenas  Date of Birth. 1970  MRN. [id number]     Primary care provider. Kobe Mcconnell  (Tel: [phone])    Admit date: 5/4/2022    Discharge date (blank if same as Note Date): Note Date: 5/6/2022     Reason for Hospitalization. Chief Complaint   Patient presents with    Shortness of Breath     cough and laryngitis x 1 month, shortness of breath x 3-4 days. Hx asthma and CHF - no rescue inhaler used due to being out of meds         Significant Findings. Principal Problem:    Acute respiratory failure ([address]      Problems and results from this hospitalization that need follow up. 1. asthma    Significant test results and incidental findings. CTPA  1. No acute abnormality. Invasive procedures and treatments. 1. None     Problem-based Hospital Course. Patient is a 45 yo female who presented to the hospital with shortness of breath, cough, and sore throat. In the ED she was found to be hypoxic with sats in the 80s. CTPA was negative for PE. She was admitted with asthma exacerbation, started on solu medrol and nebs. She was transitioned to prednisone but was still requiring O2 at time of dc. Patient is morbidly obese with a BMI of 76 and has a history of diastolic CHF as well. She was discharged home in stable condition. Consults. IP CONSULT TO HOSPITALIST    Physical examination on discharge day. BP (!) 111/59   Pulse 97   Temp 97.9 °F (36.6 °C) (Oral)   Resp 20   Ht 5' 4.5\" (1.638 m)   Wt (!) 451 lb 4.8 oz (204.7 kg)   SpO2 92%   BMI 76.27 kg/m²   General appearance. Alert. Looks comfortable. HEENT. Sclera clear. Moist mucus membranes. Cardiovascular. Regular rate and rhythm, normal S1, S2. No murmur. Respiratory. Not using accessory muscles. Clear to auscultation bilaterally, no wheeze. Gastrointestinal. Abdomen soft, non-tender, not distended, normal bowel sounds  Neurology. Facial symmetry. No speech deficits. Moving all extremities equally. Extremities. No edema in lower extremities. Skin. Warm, dry, normal turgor    Condition at time of discharge stable    Medication instructions provided to patient at discharge. Medication List      START taking these medications    glucose monitoring kit  1 kit by Does not apply route daily     ipratropium-albuterol 0.5-2.5 (3) MG/3ML Soln nebulizer solution  Commonly known as: DUONEB  Inhale 3 mLs into the lungs every 4 hours (while awake)     predniSONE 20 MG tablet  Commonly known as: DELTASONE  Take 2 tablets by mouth daily for 5 days  Start taking on: May 7, 2022        CHANGE how you take these medications    carvedilol 25 MG tablet  Commonly known as: COREG  TAKE 1 TABLET BY MOUTH TWICE DAILY  What changed: See the new instructions. furosemide 40 MG tablet  Commonly known as: LASIX  TAKE 2 TABLETS BY MOUTH TWICE DAILY  What changed: See the new instructions. irbesartan 75 MG tablet  Commonly known as: AVAPRO  Take 1 tablet by mouth daily  What changed: when to take this     vitamin D 1.25 MG (49089 UT) Caps capsule  Commonly known as: ERGOCALCIFEROL  TAKE 1 CAPSULE BY MOUTH 1 TIME A WEEK  What changed: See the new instructions. CONTINUE taking these medications    albuterol sulfate  (90 Base) MCG/ACT inhaler     aspirin 81 MG chewable tablet  Chew and swallow 1 tablet by mouth daily     atorvastatin 40 MG tablet  Commonly known as: LIPITOR  Take 1 tablet by mouth daily     ferrous sulfate 325 (65 Fe) MG tablet  Commonly known as: IRON 325     glyBURIDE 2.5 MG tablet  Commonly known as: DIABETA     ketoconazole 2 % cream  Commonly known as: NIZORAL     spironolactone 25 MG tablet  Commonly known as: ALDACTONE  Take 1 tablet by mouth daily  Start taking on:  May 7, 2022        STOP taking these medications    Beano M[employer]ways Tabs           Where to Get Your Medications      These medications [facility], [address] [address]    Phone: [phone]   · glucose monitoring kit  · ipratropium-albuterol 0.5-2.5 (3) MG/3ML Soln nebulizer solution  · predniSONE 20 MG tablet     Information about where to get these medications is not yet available    Ask your nurse or doctor about these medications  · spironolactone 25 MG tablet         Discharge recommendations given to patient. Follow Up. PCP in 1 week   Disposition. home  Activity. activity as tolerated  Diet: ADULT DIET; Regular; 4 carb choices (60 gm/meal)      Spent 32 minutes in discharge process. Signed:   Amy Lopez PA-C     5/6/2022 3:54 PM

## 2022-05-09 ENCOUNTER — CARE COORDINATION (OUTPATIENT)
Dept: CASE MANAGEMENT | Age: 52
End: 2022-05-09

## 2022-05-09 RX ORDER — ASPIRIN 81 MG/1
TABLET, CHEWABLE ORAL
Qty: 90 TABLET | Refills: 3 | Status: SHIPPED | OUTPATIENT
Start: 2022-05-09

## 2022-05-09 RX ORDER — CARVEDILOL 25 MG/1
TABLET ORAL
Qty: 180 TABLET | Refills: 3 | Status: SHIPPED | OUTPATIENT
Start: 2022-05-09

## 2022-05-09 RX ORDER — SPIRONOLACTONE 25 MG/1
25 TABLET ORAL DAILY
Qty: 90 TABLET | Refills: 3 | Status: SHIPPED | OUTPATIENT
Start: 2022-05-09

## 2022-05-09 NOTE — CARE COORDINATION
Care Transitions Outreach Attempt    Call within 2 business days of discharge: Yes   Attempted to reach patient for 1st attempt at 24 hr transitions of care follow up. Unable to reach patient. Yolanda Mckeon LPN, [facility]  PH: [phone]  Left HIPPA Compliant VM. Patient: Ludwin Hidalgo Patient : 1970 MRN: [id number]    Last Discharge [facility]       Complaint Diagnosis Description Type Department Provider    22 Shortness of Breath Hypoxia . .. ED to Hosp-Admission (Discharged) (ADMITTED) THERESE 3A Judah Whitman MD; Radha Calderon... Was this an external facility discharge?  No Discharge Facility: [facility]    Noted following upcoming appointments from discharge chart review:   [facility] follow up appointment(s):   Future Appointments   Date Time Provider Ashlyn Robledo   10/3/2022  4:00 PM Emmanuel Woodward MD [facility]     Non-[facility] follow up appointment(s):

## 2022-05-10 ENCOUNTER — CARE COORDINATION (OUTPATIENT)
Dept: CASE MANAGEMENT | Age: 52
End: 2022-05-10

## 2022-05-10 NOTE — CARE COORDINATION
Tony [facility] Transitions Initial Follow Up Call    Call within 2 business days of discharge: Yes    Patient: Julio Cesar Arias Patient : 1970   MRN: [id number]  Reason for Admission: asthma  Discharge Date: 22 RARS: Readmission Risk Score: 10.9 ( )    Second & final attempt at 24 hour discharge call, no answer, CTN left VM with contact information and request for return call. CTN will send message to patient in My Chart and resolve episode and remain available. Patient has a [address] Annetta Stern PCP.         Follow Up  Future Appointments   Date Time Provider Ashlyn Robledo   10/3/2022  4:00 PM Ramírez Cantrell MD [facility] Cardio MARCO ANTONIO Torre RN

## 2022-07-29 ENCOUNTER — TELEPHONE (OUTPATIENT)
Dept: PULMONOLOGY | Age: 52
End: 2022-07-29

## 2022-07-29 NOTE — TELEPHONE ENCOUNTER
PSG and initial consult notes sent to [facility] per pt request.

## 2022-09-30 NOTE — PROGRESS NOTES
[facility]  Advanced CHF/Pulmonary Hypertension   Cardiac Evaluation      Shahnaz Loredo  YOB: 1970    Date of Visit:  10/3/22    Chief Complaint   Patient presents with    Congestive Heart Failure    Edema    Fatigue    Shortness of Breath        History of Present Illness:  Shahnaz Loredo is a 46 y.o. female with a past medical history of HTN, DM, TRUE on Bipap, asthma, and morbid obesity. She is following with [facility]) Weight loss for possible bariatric surgery. She has received both Pfizer Covid vaccines late June into July thru [place]. She has struggled with losing weight      On 5/4/2022, Patient is a 47 yo female who presented to the hospital with shortness of breath, cough, and sore throat. In the ED she was found to be hypoxic with sats in the 80s. CTPA was negative for PE. She was admitted with asthma exacerbation, started on solu medrol and nebs. She was transitioned to prednisone but was still requiring O2 at time of discharge on 5/17/22. She is using her Bipap every night. She goes into work on Monday and Wednesdays and does not take Lasix those days. All other days, she takes 80mg BID (40mg tabs) except on Thurs and Friday, only One Lasix 40mg in the evening instead of two. Today she is here for follow up for chronic systolic and diastolic heart failure. Weight is down a few pounds. She is following with weight management at [facility] and she is undecided about weight loss surgery and trying to think of other ways to achieve weight loss. Her SOB is about the same. Reviewed last echo. She is working from home 3 days a week. She sometimes skips her water pills due to work. She thinks her work paperwork will be running out soon in order to work from home. She is a  with their job. She has oxygen she wears at work while seated. She hasn't had the booster yet for Covid.   She feels like coreg is effecting her sinuses and needed to remove her mask today. Allergies   Allergen Reactions    Levofloxacin Other (See Comments)     headache     Current Outpatient Medications   Medication Sig Dispense Refill    Multiple Vitamin (DAILY NARESH) TABS Take by mouth daily      fluticasone (FLONASE) 50 MCG/ACT nasal spray 2 sprays by Each Nostril route daily 48 g 2    carvedilol (COREG) 25 MG tablet TAKE 1 TABLET BY MOUTH TWICE DAILY (Patient taking differently: Take 25 mg by mouth daily) 180 tablet 3    spironolactone (ALDACTONE) 25 MG tablet TAKE 1 TABLET BY MOUTH DAILY 90 tablet 3    aspirin 81 MG chewable tablet CHEW AND SWALLOW 1 TABLET BY MOUTH DAILY 90 tablet 3    ipratropium-albuterol (DUONEB) 0.5-2.5 (3) MG/3ML SOLN nebulizer solution Inhale 3 mLs into the lungs every 4 hours (while awake) 360 mL 1    vitamin D (ERGOCALCIFEROL) 1.25 MG (55687 UT) CAPS capsule TAKE 1 CAPSULE BY MOUTH 1 TIME A WEEK (Patient taking differently: once a week) 12 capsule 1    furosemide (LASIX) 40 MG tablet TAKE 2 TABLETS BY MOUTH TWICE DAILY (Patient taking differently: 80 mg Monday, Wed, Friday, Sat, Sun 80mg BID  Tues, Thurs  80mg QD) 360 tablet 3    ferrous sulfate (IRON 325) 325 (65 Fe) MG tablet Take 325 mg by mouth 2 times daily      irbesartan (AVAPRO) 75 MG tablet Take 1 tablet by mouth daily (Patient taking differently: Take 75 mg by mouth at bedtime) 90 tablet 3    atorvastatin (LIPITOR) 40 MG tablet Take 1 tablet by mouth daily 90 tablet 3    glyBURIDE (DIABETA) 2.5 MG tablet TK 1 T PO D  5    albuterol sulfate  (90 Base) MCG/ACT inhaler Inhale 2 puffs into the lungs      glucose monitoring (FREESTYLE FREEDOM) kit 1 kit by Does not apply route daily 1 kit 0    ketoconazole (NIZORAL) 2 % cream Apply topically daily as needed Apply topically daily. No current facility-administered medications for this visit.        Past Medical History:   Diagnosis Date    Acute on chronic combined systolic and diastolic CHF (congestive heart failure) ([facility] 1/26/2018    Asthma     Cardiomyopathy ([facility]     Diabetes mellitus ([facility]     Heart murmur     Hypertension     Obstructive sleep apnea (adult) (pediatric) 1/26/2018    Sleep apnea     Urinary incontinence      No past surgical history on file. Family History   Problem Relation Age of Onset    High Blood Pressure Mother     Other Mother         cardiac murmur    Arthritis Mother     Hypertension Mother     High Blood Pressure Father     Hypertension Father     Stroke Father     Arthritis Father     High Blood Pressure Sister     Diabetes Sister         prediabetic    Hypertension Sister     Hypertension Maternal Uncle     Cancer Maternal Uncle     Cancer Maternal Grandmother     Breast Cancer Maternal Aunt      Social History     Socioeconomic History    Marital status: Single     Spouse name: Not on file    Number of children: 3    Years of education: Not on file    Highest education level: Not on file   Occupational History    Not on file   Tobacco Use    Smoking status: Never    Smokeless tobacco: Never   Vaping Use    Vaping Use: Never used   Substance and Sexual Activity    Alcohol use: No    Drug use: No    Sexual activity: Never   Other Topics Concern    Not on file   Social History Narrative    Not on file     Social Determinants of Health     Financial Resource Strain: Not on file   Food Insecurity: Not on file   Transportation Needs: Not on file   Physical Activity: Not on file   Stress: Not on file   Social Connections: Not on file   Intimate Partner Violence: Not on file   Housing Stability: Not on file       Review of Systems:   Constitutional: there has been no unanticipated weight loss. There's been no change in energy level, sleep pattern, or activity level. +SOB and fatigue. Eyes: No visual changes or diplopia. No scleral icterus. ENT: No Headaches, hearing loss or vertigo. No mouth sores or sore throat.   Cardiovascular: Reviewed in HPI  Respiratory: No cough or wheezing, no sputum production. No hematemesis. Gastrointestinal: No abdominal pain, appetite loss, blood in stools. No change in bowel or bladder habits. Genitourinary: No dysuria, trouble voiding, or hematuria. Musculoskeletal:  No gait disturbance, weakness or joint complaints. Integumentary: No rash or pruritis. Neurological: No headache, diplopia, change in muscle strength, numbness or tingling. No change in gait, balance, coordination, mood, affect, memory, mentation, behavior. Psychiatric: No anxiety, no depression. Endocrine: No malaise, fatigue or temperature intolerance. No excessive thirst, fluid intake, or urination. No tremor. Hematologic/Lymphatic: No abnormal bruising or bleeding, blood clots or swollen lymph nodes. Allergic/Immunologic: No nasal congestion or hives. +nasal congestion    Physical Examination:    Vitals:    10/03/22 1604   BP: 122/84   Pulse: 81   SpO2: 96%   Weight: (!) 448 lb (203.2 kg)   Height: 5' 4.5\" (1.638 m)       Body mass index is 75.71 kg/m². Wt Readings from Last 3 Encounters:   10/03/22 (!) 448 lb (203.2 kg)   05/06/22 (!) 451 lb 4.8 oz (204.7 kg)   04/01/22 (!) 439 lb (199.1 kg)     BP Readings from Last 3 Encounters:   10/03/22 122/84   05/06/22 (!) 111/59   04/01/22 120/82     Constitutional and General Appearance: Morbidly obese  WD/WN in NAD,   HEENT:  NC/AT  GILBERTO  No problems with hearing  Skin:  Warm, dry  Respiratory:  Normal excursion and expansion without use of accessory muscles  Resp Auscultation: Normal breath sounds without dullness  Cardiovascular: The apical impulses not displaced  Heart tones are crisp and normal  Cervical veins are not engorged  The carotid upstroke is normal in amplitude and contour without delay or bruit  JVP less than 8 cm H2O  RRR with nl S1 and S2 without m,r,g  Peripheral pulses are symmetrical and full  There is no clubbing, cyanosis of the extremities. Bilateral edema to both lower extremities R>L including right thigh. Femoral Arteries: 2+ and equal  Pedal Pulses: 2+ and equal   Neck:  No thyromegaly  Abdomen:  No masses or tenderness  Liver/Spleen: No Abnormalities Noted  Neurological/Psychiatric:  Alert and oriented in all spheres  Moves all extremities well. Gait impaired by weight and inner right leg/thigh edema. Exhibits normal gait balance and coordination. Gait impaired by weight and inner right leg/thigh edema. No abnormalities of mood, affect, memory, mentation, or behavior are noted    Diagnostic Testing:  Echo 1/7/22   Technically difficult and limited study. Left ventricular function is normal with ejection fraction estimated at 55%. No obvious regional wall motion abnormalities are noted. Diastolic filling parameters suggest grade II diastolic dysfunction. The aortic valve is not well visualized. Vmax 2.58 m/s, Mean PG= 14 mmHg,   suggestive of mild aortic stenosis. Mild tricuspid regurgitation. Estimated pulmonary artery systolic pressure   is at 57 mmHg assuming a right atrial pressure of 8 mmHg. Echo 1/26/ 2018  Technically limited study due to body habitus. Normal left ventricle size and wall thickness. Left ventricular function is difficult to estimate due to poor endocardial visualization but appears to be reduced and is estimated at 45-50%. Mild mitral regurgitation is present. There is mild-moderate tricuspid regurgitation with RVSP estimated at 48mmHg. Labs were reviewed including labs from other hospital systems through [facility]. Cardiac testing was reviewed including echos, nuclear scans, cardiac catheterization, including from other hospital systems through [facility]. Assessment:    1. Chronic combined systolic (congestive) and diastolic (congestive) heart failure ([address]    2. TRUE (obstructive sleep apnea)    3. Essential hypertension    4. SOB (shortness of breath)    5. Morbid obesity ([address]    6. Mixed hyperlipidemia         1.  Chronic systolic heart failure: Stable. Appears compensated.   -Continue Lasix, spironolactone, and Coreg.  -Continue irbesartan 75mg 1 tab daily.   -Continue to work on weight loss. -ProBNP>76 (4/1/22), 56 (7/20/20),   -ECHO 2018> EF 45-50%  -Echo 1/7/22 EF 55% and RVSP 57mmHg     2. Essential hypertension:  Stable  /84   Pulse 81   Ht 5' 4.5\" (1.638 m)   Wt (!) 448 lb (203.2 kg)   SpO2 96%   BMI 75.71 kg/m²   ~Wt is about the same. 3. Hyperlipidemia: Stable on Lipitor 40mg  11/11/20> , TG 60, HDL 48, LDL 78  7/20/20> , TG 48, HDL 47,   ~1/7/22> , HDL 47,  LDL 67, . Under control. 4. Morbid obesity:  She has worked with [employer] loss and made dietary changes. Now with [facility] [place] weight loss.   -has struggled to lose weight but keeps trying.    -  +right thigh lymphedema noted      5. TRUE (obstructive sleep apnea):  Wearing Bipap. Follows with Dr. Walterine Babinski. 6. Vitamin D deficiency: Level was 16.8 from 7/20/21. Will begin Vit D supplement 50,000u every week. ~1/7/22 Vit D 28.1. Improved from 22.9. Plan:  1. Call or My Chart the office if you need clearance for weight loss surgery. 2.   Labs FASTING. [facility]. Lab is room 107.   3.   See Dr. Tania Huffman in 4-6 months or RGNP. Scribe's attestation: This note was scribed in the presence of Rod Caballero M.D. by Caitlin Cruz RN     The scribe's documentation has been prepared under my direction and personally reviewed by me in its entirety. I confirm that the note above accurately reflects all work, treatment, procedures, and medical decision making performed by me. Time Based Itemization  A total of 40 minutes was spent on today's patient encounter.   If applicable, non-patient-facing activities:  ( x)Preparing to see the patient and reviewing records  ( x) Individual interpretation of results  ( ) Discussion or coordination of care with other health care professionals  ( x) Ordering of unique tests, medications, or procedures  ( x) Documentation within the EHR      I appreciate the opportunity of cooperating in the care of this patient.     Yolanda Pete M.D., [facility]

## 2022-10-03 ENCOUNTER — OFFICE VISIT (OUTPATIENT)
Dept: CARDIOLOGY CLINIC | Age: 52
End: 2022-10-03
Payer: COMMERCIAL

## 2022-10-03 VITALS
SYSTOLIC BLOOD PRESSURE: 122 MMHG | HEART RATE: 81 BPM | OXYGEN SATURATION: 96 % | WEIGHT: 293 LBS | DIASTOLIC BLOOD PRESSURE: 84 MMHG | HEIGHT: 65 IN | BODY MASS INDEX: 48.82 KG/M2

## 2022-10-03 DIAGNOSIS — I50.42 CHRONIC COMBINED SYSTOLIC (CONGESTIVE) AND DIASTOLIC (CONGESTIVE) HEART FAILURE (HCC): Primary | ICD-10-CM

## 2022-10-03 DIAGNOSIS — G47.33 OSA (OBSTRUCTIVE SLEEP APNEA): ICD-10-CM

## 2022-10-03 DIAGNOSIS — R06.02 SOB (SHORTNESS OF BREATH): ICD-10-CM

## 2022-10-03 DIAGNOSIS — E78.2 MIXED HYPERLIPIDEMIA: ICD-10-CM

## 2022-10-03 DIAGNOSIS — E66.01 MORBID OBESITY (HCC): ICD-10-CM

## 2022-10-03 DIAGNOSIS — I10 ESSENTIAL HYPERTENSION: ICD-10-CM

## 2022-10-03 PROCEDURE — 99215 OFFICE O/P EST HI 40 MIN: CPT | Performed by: INTERNAL MEDICINE

## 2022-10-03 RX ORDER — MULTIVITAMIN
TABLET ORAL DAILY
COMMUNITY

## 2022-10-03 RX ORDER — FLUTICASONE PROPIONATE 50 MCG
2 SPRAY, SUSPENSION (ML) NASAL DAILY
Qty: 48 G | Refills: 2 | Status: SHIPPED | OUTPATIENT
Start: 2022-10-03

## 2022-10-03 NOTE — PATIENT INSTRUCTIONS
Plan:  1. Call or My Chart the office if you need clearance for weight loss surgery. 2.   Labs FASTING  [facility]. Lab is room 107.   3.   See Dr. Pina Márquez in 4-6 months or RGNP.

## 2022-10-25 RX ORDER — ATORVASTATIN CALCIUM 40 MG/1
40 TABLET, FILM COATED ORAL DAILY
Qty: 90 TABLET | Refills: 3 | Status: SHIPPED | OUTPATIENT
Start: 2022-10-25

## 2022-10-28 RX ORDER — ERGOCALCIFEROL 1.25 MG/1
CAPSULE ORAL
Qty: 12 CAPSULE | Refills: 1 | Status: SHIPPED | OUTPATIENT
Start: 2022-10-28

## 2022-12-02 ENCOUNTER — APPOINTMENT (OUTPATIENT)
Dept: GENERAL RADIOLOGY | Age: 52
DRG: 177 | End: 2022-12-02
Payer: COMMERCIAL

## 2022-12-02 ENCOUNTER — APPOINTMENT (OUTPATIENT)
Dept: CT IMAGING | Age: 52
DRG: 177 | End: 2022-12-02
Payer: COMMERCIAL

## 2022-12-02 ENCOUNTER — HOSPITAL ENCOUNTER (INPATIENT)
Age: 52
LOS: 3 days | Discharge: HOME HEALTH | DRG: 177 | End: 2022-12-05
Attending: INTERNAL MEDICINE | Admitting: INTERNAL MEDICINE
Payer: COMMERCIAL

## 2022-12-02 DIAGNOSIS — J96.21 ACUTE ON CHRONIC RESPIRATORY FAILURE WITH HYPOXIA (HCC): ICD-10-CM

## 2022-12-02 DIAGNOSIS — J12.82 PNEUMONIA DUE TO COVID-19 VIRUS: Primary | ICD-10-CM

## 2022-12-02 DIAGNOSIS — J45.901 EXACERBATION OF ASTHMA, UNSPECIFIED ASTHMA SEVERITY, UNSPECIFIED WHETHER PERSISTENT: ICD-10-CM

## 2022-12-02 DIAGNOSIS — U07.1 PNEUMONIA DUE TO COVID-19 VIRUS: Primary | ICD-10-CM

## 2022-12-02 LAB
A/G RATIO: 1.1 (ref 1.1–2.2)
ALBUMIN SERPL-MCNC: 3.8 G/DL (ref 3.4–5)
ALP BLD-CCNC: 121 U/L (ref 40–129)
ALT SERPL-CCNC: 21 U/L (ref 10–40)
ANION GAP SERPL CALCULATED.3IONS-SCNC: 6 MMOL/L (ref 3–16)
AST SERPL-CCNC: 22 U/L (ref 15–37)
BASOPHILS ABSOLUTE: 0 K/UL (ref 0–0.2)
BASOPHILS RELATIVE PERCENT: 0.3 %
BILIRUB SERPL-MCNC: 0.6 MG/DL (ref 0–1)
BUN BLDV-MCNC: 9 MG/DL (ref 7–20)
C-REACTIVE PROTEIN: 104.3 MG/L (ref 0–5.1)
CALCIUM SERPL-MCNC: 9.6 MG/DL (ref 8.3–10.6)
CHLORIDE BLD-SCNC: 97 MMOL/L (ref 99–110)
CO2: 36 MMOL/L (ref 21–32)
CREAT SERPL-MCNC: 0.8 MG/DL (ref 0.6–1.1)
D DIMER: 0.6 UG/ML FEU (ref 0–0.6)
EOSINOPHILS ABSOLUTE: 0 K/UL (ref 0–0.6)
EOSINOPHILS RELATIVE PERCENT: 0.8 %
FERRITIN: 481.7 NG/ML (ref 15–150)
GFR SERPL CREATININE-BSD FRML MDRD: >60 ML/MIN/{1.73_M2}
GLUCOSE BLD-MCNC: 150 MG/DL (ref 70–99)
GLUCOSE BLD-MCNC: 180 MG/DL (ref 70–99)
GLUCOSE BLD-MCNC: 206 MG/DL (ref 70–99)
HCT VFR BLD CALC: 36.3 % (ref 36–48)
HEMOGLOBIN: 11.1 G/DL (ref 12–16)
IRON SATURATION: 14 % (ref 15–50)
IRON: 35 UG/DL (ref 37–145)
LYMPHOCYTES ABSOLUTE: 0.9 K/UL (ref 1–5.1)
LYMPHOCYTES RELATIVE PERCENT: 20.5 %
MCH RBC QN AUTO: 26.7 PG (ref 26–34)
MCHC RBC AUTO-ENTMCNC: 30.6 G/DL (ref 31–36)
MCV RBC AUTO: 87.2 FL (ref 80–100)
MONOCYTES ABSOLUTE: 0.3 K/UL (ref 0–1.3)
MONOCYTES RELATIVE PERCENT: 7.7 %
NEUTROPHILS ABSOLUTE: 3 K/UL (ref 1.7–7.7)
NEUTROPHILS RELATIVE PERCENT: 70.7 %
PDW BLD-RTO: 17.6 % (ref 12.4–15.4)
PERFORMED ON: ABNORMAL
PERFORMED ON: ABNORMAL
PLATELET # BLD: 205 K/UL (ref 135–450)
PMV BLD AUTO: 8.2 FL (ref 5–10.5)
POTASSIUM REFLEX MAGNESIUM: 4.5 MMOL/L (ref 3.5–5.1)
POTASSIUM REFLEX MAGNESIUM: 4.6 MMOL/L (ref 3.5–5.1)
PRO-BNP: 219 PG/ML (ref 0–124)
PROCALCITONIN: 0.06 NG/ML (ref 0–0.15)
RBC # BLD: 4.17 M/UL (ref 4–5.2)
SODIUM BLD-SCNC: 139 MMOL/L (ref 136–145)
TOTAL IRON BINDING CAPACITY: 252 UG/DL (ref 260–445)
TOTAL PROTEIN: 7.4 G/DL (ref 6.4–8.2)
TROPONIN: <0.01 NG/ML
WBC # BLD: 4.3 K/UL (ref 4–11)

## 2022-12-02 PROCEDURE — 94761 N-INVAS EAR/PLS OXIMETRY MLT: CPT

## 2022-12-02 PROCEDURE — 83540 ASSAY OF IRON: CPT

## 2022-12-02 PROCEDURE — 82746 ASSAY OF FOLIC ACID SERUM: CPT

## 2022-12-02 PROCEDURE — 94640 AIRWAY INHALATION TREATMENT: CPT

## 2022-12-02 PROCEDURE — 0202U NFCT DS 22 TRGT SARS-COV-2: CPT

## 2022-12-02 PROCEDURE — 71045 X-RAY EXAM CHEST 1 VIEW: CPT

## 2022-12-02 PROCEDURE — 84484 ASSAY OF TROPONIN QUANT: CPT

## 2022-12-02 PROCEDURE — 6360000002 HC RX W HCPCS: Performed by: INTERNAL MEDICINE

## 2022-12-02 PROCEDURE — 94760 N-INVAS EAR/PLS OXIMETRY 1: CPT

## 2022-12-02 PROCEDURE — 85379 FIBRIN DEGRADATION QUANT: CPT

## 2022-12-02 PROCEDURE — 96374 THER/PROPH/DIAG INJ IV PUSH: CPT

## 2022-12-02 PROCEDURE — 6370000000 HC RX 637 (ALT 250 FOR IP): Performed by: PHYSICIAN ASSISTANT

## 2022-12-02 PROCEDURE — 93005 ELECTROCARDIOGRAM TRACING: CPT | Performed by: PHYSICIAN ASSISTANT

## 2022-12-02 PROCEDURE — 82607 VITAMIN B-12: CPT

## 2022-12-02 PROCEDURE — 1200000000 HC SEMI PRIVATE

## 2022-12-02 PROCEDURE — 6370000000 HC RX 637 (ALT 250 FOR IP): Performed by: INTERNAL MEDICINE

## 2022-12-02 PROCEDURE — 83550 IRON BINDING TEST: CPT

## 2022-12-02 PROCEDURE — 99285 EMERGENCY DEPT VISIT HI MDM: CPT

## 2022-12-02 PROCEDURE — 86140 C-REACTIVE PROTEIN: CPT

## 2022-12-02 PROCEDURE — 84132 ASSAY OF SERUM POTASSIUM: CPT

## 2022-12-02 PROCEDURE — 83880 ASSAY OF NATRIURETIC PEPTIDE: CPT

## 2022-12-02 PROCEDURE — 84145 PROCALCITONIN (PCT): CPT

## 2022-12-02 PROCEDURE — 6360000002 HC RX W HCPCS: Performed by: PHYSICIAN ASSISTANT

## 2022-12-02 PROCEDURE — 87449 NOS EACH ORGANISM AG IA: CPT

## 2022-12-02 PROCEDURE — 36415 COLL VENOUS BLD VENIPUNCTURE: CPT

## 2022-12-02 PROCEDURE — 80053 COMPREHEN METABOLIC PANEL: CPT

## 2022-12-02 PROCEDURE — 2580000003 HC RX 258: Performed by: INTERNAL MEDICINE

## 2022-12-02 PROCEDURE — 82728 ASSAY OF FERRITIN: CPT

## 2022-12-02 PROCEDURE — 85025 COMPLETE CBC W/AUTO DIFF WBC: CPT

## 2022-12-02 PROCEDURE — 2700000000 HC OXYGEN THERAPY PER DAY

## 2022-12-02 RX ORDER — INSULIN GLARGINE 100 [IU]/ML
15 INJECTION, SOLUTION SUBCUTANEOUS NIGHTLY
Status: DISCONTINUED | OUTPATIENT
Start: 2022-12-02 | End: 2022-12-03

## 2022-12-02 RX ORDER — IPRATROPIUM BROMIDE AND ALBUTEROL SULFATE 2.5; .5 MG/3ML; MG/3ML
1 SOLUTION RESPIRATORY (INHALATION) EVERY 4 HOURS PRN
Status: DISCONTINUED | OUTPATIENT
Start: 2022-12-02 | End: 2022-12-05 | Stop reason: HOSPADM

## 2022-12-02 RX ORDER — IPRATROPIUM BROMIDE AND ALBUTEROL SULFATE 2.5; .5 MG/3ML; MG/3ML
1 SOLUTION RESPIRATORY (INHALATION) ONCE
Status: COMPLETED | OUTPATIENT
Start: 2022-12-02 | End: 2022-12-02

## 2022-12-02 RX ORDER — FUROSEMIDE 10 MG/ML
20 INJECTION INTRAMUSCULAR; INTRAVENOUS ONCE
Status: DISCONTINUED | OUTPATIENT
Start: 2022-12-02 | End: 2022-12-02

## 2022-12-02 RX ORDER — SODIUM CHLORIDE 0.9 % (FLUSH) 0.9 %
5-40 SYRINGE (ML) INJECTION EVERY 12 HOURS SCHEDULED
Status: DISCONTINUED | OUTPATIENT
Start: 2022-12-02 | End: 2022-12-05 | Stop reason: HOSPADM

## 2022-12-02 RX ORDER — ENOXAPARIN SODIUM 100 MG/ML
60 INJECTION SUBCUTANEOUS 2 TIMES DAILY
Status: DISCONTINUED | OUTPATIENT
Start: 2022-12-02 | End: 2022-12-05 | Stop reason: HOSPADM

## 2022-12-02 RX ORDER — INSULIN LISPRO 100 [IU]/ML
0-8 INJECTION, SOLUTION INTRAVENOUS; SUBCUTANEOUS
Status: DISCONTINUED | OUTPATIENT
Start: 2022-12-02 | End: 2022-12-05 | Stop reason: HOSPADM

## 2022-12-02 RX ORDER — FUROSEMIDE 10 MG/ML
40 INJECTION INTRAMUSCULAR; INTRAVENOUS 2 TIMES DAILY
Status: DISCONTINUED | OUTPATIENT
Start: 2022-12-02 | End: 2022-12-03

## 2022-12-02 RX ORDER — ONDANSETRON 2 MG/ML
4 INJECTION INTRAMUSCULAR; INTRAVENOUS EVERY 6 HOURS PRN
Status: DISCONTINUED | OUTPATIENT
Start: 2022-12-02 | End: 2022-12-05 | Stop reason: HOSPADM

## 2022-12-02 RX ORDER — FERROUS SULFATE 325(65) MG
325 TABLET ORAL 2 TIMES DAILY
Status: DISCONTINUED | OUTPATIENT
Start: 2022-12-02 | End: 2022-12-03

## 2022-12-02 RX ORDER — ONDANSETRON 4 MG/1
4 TABLET, ORALLY DISINTEGRATING ORAL EVERY 8 HOURS PRN
Status: DISCONTINUED | OUTPATIENT
Start: 2022-12-02 | End: 2022-12-05 | Stop reason: HOSPADM

## 2022-12-02 RX ORDER — GUAIFENESIN/DEXTROMETHORPHAN 100-10MG/5
10 SYRUP ORAL EVERY 4 HOURS PRN
Status: DISCONTINUED | OUTPATIENT
Start: 2022-12-02 | End: 2022-12-05 | Stop reason: HOSPADM

## 2022-12-02 RX ORDER — CARVEDILOL 6.25 MG/1
12.5 TABLET ORAL 2 TIMES DAILY
Status: DISCONTINUED | OUTPATIENT
Start: 2022-12-02 | End: 2022-12-03

## 2022-12-02 RX ORDER — ASPIRIN 81 MG/1
81 TABLET, CHEWABLE ORAL DAILY
Status: DISCONTINUED | OUTPATIENT
Start: 2022-12-02 | End: 2022-12-05 | Stop reason: HOSPADM

## 2022-12-02 RX ORDER — IPRATROPIUM BROMIDE AND ALBUTEROL SULFATE 2.5; .5 MG/3ML; MG/3ML
1 SOLUTION RESPIRATORY (INHALATION)
Status: DISCONTINUED | OUTPATIENT
Start: 2022-12-02 | End: 2022-12-03

## 2022-12-02 RX ORDER — DEXTROSE MONOHYDRATE 100 MG/ML
INJECTION, SOLUTION INTRAVENOUS CONTINUOUS PRN
Status: DISCONTINUED | OUTPATIENT
Start: 2022-12-02 | End: 2022-12-05 | Stop reason: HOSPADM

## 2022-12-02 RX ORDER — POLYETHYLENE GLYCOL 3350 17 G/17G
17 POWDER, FOR SOLUTION ORAL DAILY PRN
Status: DISCONTINUED | OUTPATIENT
Start: 2022-12-02 | End: 2022-12-05 | Stop reason: HOSPADM

## 2022-12-02 RX ORDER — ACETAMINOPHEN 325 MG/1
650 TABLET ORAL EVERY 6 HOURS PRN
Status: DISCONTINUED | OUTPATIENT
Start: 2022-12-02 | End: 2022-12-05 | Stop reason: HOSPADM

## 2022-12-02 RX ORDER — DEXAMETHASONE SODIUM PHOSPHATE 10 MG/ML
8 INJECTION, SOLUTION INTRAMUSCULAR; INTRAVENOUS ONCE
Status: COMPLETED | OUTPATIENT
Start: 2022-12-02 | End: 2022-12-02

## 2022-12-02 RX ORDER — DEXAMETHASONE SODIUM PHOSPHATE 10 MG/ML
6 INJECTION, SOLUTION INTRAMUSCULAR; INTRAVENOUS EVERY 24 HOURS
Status: DISCONTINUED | OUTPATIENT
Start: 2022-12-03 | End: 2022-12-03

## 2022-12-02 RX ORDER — ACETAMINOPHEN 650 MG/1
650 SUPPOSITORY RECTAL EVERY 6 HOURS PRN
Status: DISCONTINUED | OUTPATIENT
Start: 2022-12-02 | End: 2022-12-05 | Stop reason: HOSPADM

## 2022-12-02 RX ORDER — SPIRONOLACTONE 25 MG/1
25 TABLET ORAL DAILY
Status: DISCONTINUED | OUTPATIENT
Start: 2022-12-02 | End: 2022-12-05 | Stop reason: HOSPADM

## 2022-12-02 RX ORDER — INSULIN LISPRO 100 [IU]/ML
0-4 INJECTION, SOLUTION INTRAVENOUS; SUBCUTANEOUS NIGHTLY
Status: DISCONTINUED | OUTPATIENT
Start: 2022-12-02 | End: 2022-12-05 | Stop reason: HOSPADM

## 2022-12-02 RX ORDER — SODIUM CHLORIDE 0.9 % (FLUSH) 0.9 %
5-40 SYRINGE (ML) INJECTION PRN
Status: DISCONTINUED | OUTPATIENT
Start: 2022-12-02 | End: 2022-12-05 | Stop reason: HOSPADM

## 2022-12-02 RX ORDER — SODIUM CHLORIDE 9 MG/ML
INJECTION, SOLUTION INTRAVENOUS PRN
Status: DISCONTINUED | OUTPATIENT
Start: 2022-12-02 | End: 2022-12-05 | Stop reason: HOSPADM

## 2022-12-02 RX ORDER — PREDNISONE 20 MG/1
60 TABLET ORAL ONCE
Status: COMPLETED | OUTPATIENT
Start: 2022-12-02 | End: 2022-12-02

## 2022-12-02 RX ORDER — ATORVASTATIN CALCIUM 40 MG/1
40 TABLET, FILM COATED ORAL DAILY
Status: DISCONTINUED | OUTPATIENT
Start: 2022-12-02 | End: 2022-12-05 | Stop reason: HOSPADM

## 2022-12-02 RX ORDER — FLUTICASONE PROPIONATE 50 MCG
2 SPRAY, SUSPENSION (ML) NASAL DAILY
Status: DISCONTINUED | OUTPATIENT
Start: 2022-12-02 | End: 2022-12-05 | Stop reason: HOSPADM

## 2022-12-02 RX ORDER — ACETAMINOPHEN 500 MG
1000 TABLET ORAL ONCE
Status: COMPLETED | OUTPATIENT
Start: 2022-12-02 | End: 2022-12-02

## 2022-12-02 RX ADMIN — IPRATROPIUM BROMIDE AND ALBUTEROL SULFATE 1 AMPULE: .5; 3 SOLUTION RESPIRATORY (INHALATION) at 12:04

## 2022-12-02 RX ADMIN — DEXAMETHASONE SODIUM PHOSPHATE 8 MG: 10 INJECTION INTRAMUSCULAR; INTRAVENOUS at 13:38

## 2022-12-02 RX ADMIN — FERROUS SULFATE TAB 325 MG (65 MG ELEMENTAL FE) 325 MG: 325 (65 FE) TAB at 21:25

## 2022-12-02 RX ADMIN — ACETAMINOPHEN 1000 MG: 500 TABLET ORAL at 13:37

## 2022-12-02 RX ADMIN — INSULIN GLARGINE 15 UNITS: 100 INJECTION, SOLUTION SUBCUTANEOUS at 21:29

## 2022-12-02 RX ADMIN — PREDNISONE 60 MG: 20 TABLET ORAL at 11:10

## 2022-12-02 RX ADMIN — IPRATROPIUM BROMIDE AND ALBUTEROL SULFATE 1 AMPULE: .5; 2.5 SOLUTION RESPIRATORY (INHALATION) at 20:19

## 2022-12-02 RX ADMIN — BARICITINIB 4 MG: 2 TABLET, FILM COATED ORAL at 21:24

## 2022-12-02 RX ADMIN — ENOXAPARIN SODIUM 60 MG: 100 INJECTION SUBCUTANEOUS at 21:23

## 2022-12-02 RX ADMIN — CARVEDILOL 12.5 MG: 6.25 TABLET, FILM COATED ORAL at 21:23

## 2022-12-02 RX ADMIN — Medication 10 ML: at 21:25

## 2022-12-02 RX ADMIN — FUROSEMIDE 40 MG: 10 INJECTION, SOLUTION INTRAMUSCULAR; INTRAVENOUS at 17:45

## 2022-12-02 ASSESSMENT — LIFESTYLE VARIABLES: HOW OFTEN DO YOU HAVE A DRINK CONTAINING ALCOHOL: NEVER

## 2022-12-02 ASSESSMENT — PAIN - FUNCTIONAL ASSESSMENT: PAIN_FUNCTIONAL_ASSESSMENT: 0-10

## 2022-12-02 ASSESSMENT — PAIN SCALES - GENERAL
PAINLEVEL_OUTOF10: 0
PAINLEVEL_OUTOF10: 8

## 2022-12-02 ASSESSMENT — PAIN DESCRIPTION - LOCATION: LOCATION: HEAD

## 2022-12-02 NOTE — ACP (ADVANCE CARE PLANNING)
ADVANCED CARE PLANNING    Name:Malena Turcios       :  1970              MRN:  [id number]      Purpose of Encounter: Advanced care planning. Parties in attendance: :Johan Stern MD, Family members:sister. Decisional Capacity:Yes    Diagnosis: Principal Problem:    Acute on chronic respiratory failure with hypoxemia (HCC)  Resolved Problems:    * No resolved hospital problems. *    Patients Medical Story: Rolf Holbrook is a 45 y.o. female with history of chronic diastolic CHF, morbid obesity, chronic hypoxic respiratory failure on 3 L NC at baseline, TRUE, asthma, HTN, and diabetes admitted with acute on chronic toxic respiratory failure. Goals of Care Determinations: Patient wishes to focus on life-sustaining treatment. Plan: Will notify Dianne Shipman of change in care plan. Will look at further interventions as needed. Code Status: At this time patient wishes to be Full Code  Time Spent with Patient: 16 minutes      Electronically signed by Radha Harry MD on 2022 at 5:45 PM  Thank you Dianne Shipman for the opportunity to be involved in this patient's care.

## 2022-12-02 NOTE — ED PROVIDER NOTES
[facility]        Pt Name: Adelaida Hammond  MRN: [id number]  [place] 1970  Date of evaluation: 12/2/2022  Provider: Mary Jane Cortez PA-C  PCP: Jonh Weems  Note Started: 11:08 AM EST       LUIS. I have evaluated this patient. My supervising physician was available for consultation. CHIEF COMPLAINT       Chief Complaint   Patient presents with    Shortness of Breath    Positive For Covid-19     Home test on Monday + covid   sob today   did not use any inhalers today       HISTORY OF PRESENT ILLNESS   (Location, Timing/Onset, Context/Setting, Quality, Duration, Modifying Factors, Severity, Associated Signs and Symptoms)  Note limiting factors. Chief Complaint: Shortness of breath    Hudson Martinez is a 45 y.o. female with past medical history of hypertension, diabetes, asthma, CHF, TRUE, morbid obesity who presents complaining of shortness of breath for 2 days. Patient reports she started having a cough, body aches and fatigue last Sunday, tested positive for COVID at home on Monday, 5 days ago. She reports worsening shortness of breath since yesterday. She did not use her asthma inhaler, states she thinks it is at work where she is out. She does have a productive cough, yellow/green and sometimes red tenderness. No gross hemoptysis. Denies chest pain, syncope, lower extremity edema. She has 2 COVID vaccines. Patient states family members were sick over the holidays, believes she got it from them. Patient on home 3 L as needed. Nursing Notes were all reviewed and agreed with or any disagreements were addressed in the HPI. REVIEW OF SYSTEMS    (2-9 systems for level 4, 10 or more for level 5)     Review of Systems   All other systems reviewed and are negative. Positives and Pertinent negatives as per HPI. Except as noted above in the ROS, all other systems were reviewed and negative.        PAST MEDICAL HISTORY Past Medical History:   Diagnosis Date    Acute on chronic combined systolic and diastolic CHF (congestive heart failure) ([facility] 1/26/2018    Asthma     Cardiomyopathy ([facility]     Diabetes mellitus ([facility]     Heart murmur     Hypertension     Obstructive sleep apnea (adult) (pediatric) 1/26/2018    Sleep apnea     Urinary incontinence          SURGICAL HISTORY   History reviewed. No pertinent surgical history. CURRENTMEDICATIONS       Previous Medications    ALBUTEROL SULFATE  (90 BASE) MCG/ACT INHALER    Inhale 2 puffs into the lungs    ASPIRIN 81 MG CHEWABLE TABLET    CHEW AND SWALLOW 1 TABLET BY MOUTH DAILY    ATORVASTATIN (LIPITOR) 40 MG TABLET    TAKE 1 TABLET BY MOUTH DAILY    CARVEDILOL (COREG) 25 MG TABLET    TAKE 1 TABLET BY MOUTH TWICE DAILY    FERROUS SULFATE (IRON 325) 325 (65 FE) MG TABLET    Take 325 mg by mouth 2 times daily    FLUTICASONE (FLONASE) 50 MCG/ACT NASAL SPRAY    2 sprays by Each Nostril route daily    FUROSEMIDE (LASIX) 40 MG TABLET    TAKE 2 TABLETS BY MOUTH TWICE DAILY    GLUCOSE MONITORING (FREESTYLE FREEDOM) KIT    1 kit by Does not apply route daily    GLYBURIDE (DIABETA) 2.5 MG TABLET    TK 1 T PO D    IPRATROPIUM-ALBUTEROL (DUONEB) 0.5-2.5 (3) MG/3ML SOLN NEBULIZER SOLUTION    Inhale 3 mLs into the lungs every 4 hours (while awake)    IRBESARTAN (AVAPRO) 75 MG TABLET    Take 1 tablet by mouth daily    KETOCONAZOLE (NIZORAL) 2 % CREAM    Apply topically daily as needed Apply topically daily.      MULTIPLE VITAMIN (DAILY NARESH) TABS    Take by mouth daily    SPIRONOLACTONE (ALDACTONE) 25 MG TABLET    TAKE 1 TABLET BY MOUTH DAILY    VITAMIN D (ERGOCALCIFEROL) 1.25 MG (86984 UT) CAPS CAPSULE    TAKE 1 CAPSULE BY MOUTH 1 TIME A WEEK         ALLERGIES     Levofloxacin    FAMILYHISTORY       Family History   Problem Relation Age of Onset    High Blood Pressure Mother     Other Mother         cardiac murmur    Arthritis Mother     Hypertension Mother     High Blood Pressure Father     Hypertension Father     Stroke Father     Arthritis Father     High Blood Pressure Sister     Diabetes Sister         prediabetic    Hypertension Sister     Hypertension Maternal Uncle     Cancer Maternal Uncle     Cancer Maternal Grandmother     Breast Cancer Maternal Aunt           SOCIAL HISTORY       Social History     Tobacco Use    Smoking status: Never    Smokeless tobacco: Never   Vaping Use    Vaping Use: Never used   Substance Use Topics    Alcohol use: No    Drug use: No       SCREENINGS    [facility] Coma Scale  Eye Opening: Spontaneous  Best Verbal Response: Oriented  Best Motor Response: Obeys commands  Tawanda Coma Scale Score: 15        PHYSICAL EXAM    (up to 7 for level 4, 8 or more for level 5)     ED Triage Vitals [12/02/22 1034]   BP Temp Temp src Heart Rate Resp SpO2 Height Weight   (!) 142/92 98.4 °F (36.9 °C) -- 87 24 98 % 5' 4\" (1.626 m) (!) 454 lb (205.9 kg)       Physical Exam  Vitals and nursing note reviewed. Constitutional:       General: She is not in acute distress. Appearance: She is obese. She is not ill-appearing or toxic-appearing. HENT:      Head: Normocephalic and atraumatic. Right Ear: Tympanic membrane, ear canal and external ear normal.      Left Ear: Ear canal and external ear normal. A middle ear effusion is present. Tympanic membrane is not erythematous or bulging. Nose: Nose normal.      Mouth/Throat:      Mouth: Mucous membranes are moist.      Pharynx: Oropharynx is clear. Eyes:      Conjunctiva/sclera: Conjunctivae normal.   Cardiovascular:      Rate and Rhythm: Normal rate and regular rhythm. Pulses: Normal pulses. Heart sounds: Normal heart sounds. Pulmonary:      Effort: Pulmonary effort is normal. No respiratory distress. Comments: Decreased air movement bilaterally  Abdominal:      General: Abdomen is flat. Bowel sounds are normal. There is no distension. Palpations: Abdomen is soft. Tenderness:  There is no abdominal tenderness. There is no guarding or rebound. Musculoskeletal:         General: Normal range of motion. Cervical back: Normal range of motion and neck supple. Skin:     General: Skin is warm and dry. Capillary Refill: Capillary refill takes less than 2 seconds. Neurological:      Mental Status: She is alert and oriented to person, place, and time. Psychiatric:         Mood and Affect: Mood normal.         Behavior: Behavior normal.       DIAGNOSTIC RESULTS   LABS:    Labs Reviewed   CBC WITH AUTO DIFFERENTIAL - Abnormal; Notable for the following components:       Result Value    Hemoglobin 11.1 (*)     MCHC 30.6 (*)     RDW 17.6 (*)     Lymphocytes Absolute 0.9 (*)     All other components within normal limits   COMPREHENSIVE METABOLIC PANEL W/ REFLEX TO MG FOR LOW K - Abnormal; Notable for the following components:    Chloride 97 (*)     CO2 36 (*)     Glucose 150 (*)     All other components within normal limits   BRAIN NATRIURETIC PEPTIDE - Abnormal; Notable for the following components:    Pro- (*)     All other components within normal limits   TROPONIN   PROCALCITONIN       When ordered only abnormal lab results are displayed. All other labs were within normal range or not returned as of this dictation. EKG: When ordered, EKG's are interpreted by the Emergency Department Physician in the absence of a cardiologist.  Please see their note for interpretation of EKG. RADIOLOGY:   Non-plain film images such as CT, Ultrasound and MRI are read by the radiologist. Plain radiographic images are visualized and preliminarily interpreted by the ED Provider with the below findings:        Interpretation per the Radiologist below, if available at the time of this note:    XR CHEST PORTABLE   Final Result   Cardiomegaly with pulmonary vascular congestion and diffuse interstitial and   alveolar opacities.   This could represent interstitial edema as well as an   atypical/viral infection. No results found. PROCEDURES   Unless otherwise noted below, none     Procedures    CRITICAL CARE TIME       CONSULTS:  None      EMERGENCY DEPARTMENT COURSE and DIFFERENTIAL DIAGNOSIS/MDM:   Vitals:    Vitals:    12/02/22 1034 12/02/22 1205 12/02/22 1245   BP: (!) 142/92  121/68   Pulse: 87 82 94   Resp: 24 18 25   Temp: 98.4 °F (36.9 °C)     SpO2: 98% 97% 97%   Weight: (!) 454 lb (205.9 kg)     Height: 5' 4\" (1.626 m)         Patient was given the following medications:  Medications   acetaminophen (TYLENOL) tablet 1,000 mg (has no administration in time range)   dexamethasone (PF) (DECADRON) injection 8 mg (has no administration in time range)   furosemide (LASIX) injection 20 mg (has no administration in time range)   predniSONE (DELTASONE) tablet 60 mg (60 mg Oral Given 12/2/22 1110)   ipratropium-albuterol (DUONEB) nebulizer solution 1 ampule (1 ampule Inhalation Given 12/2/22 1204)         Is this patient to be included in the SEP-1 Core Measure due to severe sepsis or septic shock? No   Exclusion criteria - the patient is NOT to be included for SEP-1 Core Measure due to:  2+ SIRS criteria are not met    1315 -recheck of patient, requiring 5 L O2 to keep sats 90 to 92%. Will admit for acute on chronic hypoxic respiratory failure. Patient agrees with plan for admission. Will admit to medicine via perfect serve messaging system per hospital protocol. MDM - patient presenting with shortness of breath for 2 days. She tested positive for COVID 5 days ago. Here, decreased air movement on exam, consistent with history of asthma. Slight improvement with breathing treatment, remains requiring 5 L oxygen, will admit. Chest x-ray difficult to read due to body habitus, read as atypical pneumonia versus edema. BNP slightly elevated at 200, procalcitonin pending at this time. No tachycardia, chest pain, patient has COVID and asthma to explain hypoxia, doubt PE.   Will admit, started on IV steroids, breathing treatments. FINAL IMPRESSION      1. Pneumonia due to COVID-19 virus    2. Exacerbation of asthma, unspecified asthma severity, unspecified whether persistent    3. Acute on chronic respiratory failure with hypoxia [facility])          DISPOSITION/PLAN   DISPOSITION Decision To Admit 12/02/2022 01:15:47 PM      PATIENT REFERRED TO:  No follow-up provider specified. DISCHARGE MEDICATIONS:  New Prescriptions    No medications on file       DISCONTINUED MEDICATIONS:  Discontinued Medications    No medications on file              (Please note that portions of this note were completed with a voice recognition program.  Efforts were made to edit the dictations but occasionally words are mis-transcribed. )    Lennie Willis PA-C (electronically signed)            Lennie Willis PA-C  12/02/22 Ashlyn Simeon PA-C  12/02/22 [id number]

## 2022-12-02 NOTE — H&P
HOSPITALISTS HISTORY AND PHYSICAL    12/2/2022 2:09 PM    Patient Information:  Jorge Luis Sams is a 45 y.o. female [id number]  PCP:  Mita Brush (Tel: [phone] )    Chief complaint:    Chief Complaint   Patient presents with    Shortness of Breath    Positive For Covid-19     Home test on Monday + covid   sob today   did not use any inhalers today      History of Present Illness:  Samara Clarke is a 45 y.o. female with history of chronic diastolic CHF, morbid obesity, chronic hypoxic respiratory failure on 3 L NC at baseline, TRUE, asthma, HTN, and diabetes presented with complaints of worsening dyspnea, cough productive of yellowish sputum and blood-tinged and malaise for 2 days. She had tested positive for COVID at home 5 days ago. She had also hypoxic at home with O2 sats in the low 70s on 3 L thus presented for further evaluation. She denied any fevers, chest pain, worsening lower extremity edema or orthopnea. In the ED chest x-ray showed cardiomegaly and pulmonary vascular congestion with bilateral lung infiltrates concerning for edema/viral or atypical pneumonia. History obtained from patient and sister. REVIEW OF SYSTEMS:   Constitutional: Negative for fever,chills or night sweats  ENT: Negative for rhinorrhea, epistaxis, hoarseness, sore throat. Respiratory: Negative for wheezing  Cardiovascular: Negative for chest pain, palpitations   Gastrointestinal: Negative for nausea, vomiting, diarrhea  Genitourinary: Negative for polyuria, dysuria   Hematologic/Lymphatic: Negative for bleeding tendency, easy bruising  Musculoskeletal: Negative for myalgias and arthralgias  Neurologic: Negative for confusion,dysarthria. Skin: Negative for itching,rash  Psychiatric: Negative for depression,anxiety, agitation. Endocrine: Negative for polydipsia,polyuria,heat /cold intolerance.     Past Medical History:   has a past medical history of Acute on chronic combined systolic and diastolic CHF (congestive heart failure) ([facility] [address] Asthma, Cardiomyopathy ([facility] [address] Diabetes mellitus ([facility] [address] Heart murmur, Hypertension, Obstructive sleep apnea (adult) (pediatric), Sleep apnea, and Urinary incontinence. Past Surgical History:   has no past surgical history on file. Medications:  No current facility-administered medications on file prior to encounter.      Current Outpatient Medications on File Prior to Encounter   Medication Sig Dispense Refill    vitamin D (ERGOCALCIFEROL) 1.25 MG (70179 UT) CAPS capsule TAKE 1 CAPSULE BY MOUTH 1 TIME A WEEK 12 capsule 1    atorvastatin (LIPITOR) 40 MG tablet TAKE 1 TABLET BY MOUTH DAILY 90 tablet 3    Multiple Vitamin (DAILY NARESH) TABS Take by mouth daily      fluticasone (FLONASE) 50 MCG/ACT nasal spray 2 sprays by Each Nostril route daily 48 g 2    carvedilol (COREG) 25 MG tablet TAKE 1 TABLET BY MOUTH TWICE DAILY (Patient taking differently: Take 25 mg by mouth daily) 180 tablet 3    spironolactone (ALDACTONE) 25 MG tablet TAKE 1 TABLET BY MOUTH DAILY 90 tablet 3    aspirin 81 MG chewable tablet CHEW AND SWALLOW 1 TABLET BY MOUTH DAILY 90 tablet 3    ipratropium-albuterol (DUONEB) 0.5-2.5 (3) MG/3ML SOLN nebulizer solution Inhale 3 mLs into the lungs every 4 hours (while awake) 360 mL 1    glucose monitoring (FREESTYLE FREEDOM) kit 1 kit by Does not apply route daily 1 kit 0    furosemide (LASIX) 40 MG tablet TAKE 2 TABLETS BY MOUTH TWICE DAILY (Patient taking differently: 80 mg Monday, Wed, Friday, Sat, Sun 80mg BID  Tues, Thurs  80mg QD) 360 tablet 3    ferrous sulfate (IRON 325) 325 (65 Fe) MG tablet Take 325 mg by mouth 2 times daily      irbesartan (AVAPRO) 75 MG tablet Take 1 tablet by mouth daily (Patient not taking: Reported on 12/2/2022) 90 tablet 3    glyBURIDE (DIABETA) 2.5 MG tablet TK 1 T PO D  5    albuterol sulfate  (90 Base) MCG/ACT inhaler Inhale 2 puffs into the lungs      ketoconazole (NIZORAL) 2 % cream Apply topically daily as needed Apply topically daily. Allergies: Allergies   Allergen Reactions    Levofloxacin Other (See Comments)     headache        Social History:  Patient Lives with family   reports that she has never smoked. She has never used smokeless tobacco. She reports that she does not drink alcohol and does not use drugs. Family History:  family history includes Arthritis in her father and mother; Breast Cancer in her maternal aunt; Cancer in her maternal grandmother and maternal uncle; Diabetes in her sister; High Blood Pressure in her father, mother, and sister; Hypertension in her father, maternal uncle, mother, and sister; Other in her mother; Stroke in her father. Physical Exam:  /68   Pulse 94   Temp 98.4 °F (36.9 °C)   Resp 25   Ht 5' 4\" (1.626 m)   Wt (!) 454 lb (205.9 kg)   LMP  (LMP Unknown)   SpO2 97%   BMI 77.93 kg/m²     General appearance: Morbidly obese, appears comfortable. Well nourished  Eyes: Sclera clear, pupils equal  ENT: Moist mucus membranes, no thrush. Trachea midline. Cardiovascular: Regular rhythm, normal S1, S2. No murmur, gallop, rub. No edema in lower extremities  Respiratory: Decreased AE bilaterally with no added sounds. Normal respiratory effort. Gastrointestinal: Abdomen soft, non-tender, not distended, normal bowel sounds  Musculoskeletal: No cyanosis in digits, neck supple  Neurology: Cranial nerves grossly intact. Alert and oriented in time, place and person. No speech or motor deficits  Psychiatry: Appropriate affect.  Not agitated  Skin: Warm, dry, normal turgor, no rash  Brisk capillary refill, peripheral pulses palpable     Labs:  CBC:   Lab Results   Component Value Date/Time    WBC 4.3 12/02/2022 11:32 AM    RBC 4.17 12/02/2022 11:32 AM    HGB 11.1 12/02/2022 11:32 AM    HCT 36.3 12/02/2022 11:32 AM    MCV 87.2 12/02/2022 11:32 AM    MCH 26.7 12/02/2022 11:32 AM MCHC 30.6 12/02/2022 11:32 AM    RDW 17.6 12/02/2022 11:32 AM     12/02/2022 11:32 AM    MPV 8.2 12/02/2022 11:32 AM     BMP:    Lab Results   Component Value Date/Time     12/02/2022 11:32 AM    K 4.5 12/02/2022 11:32 AM    CL 97 12/02/2022 11:32 AM    CO2 36 12/02/2022 11:32 AM    BUN 9 12/02/2022 11:32 AM    CREATININE 0.8 12/02/2022 11:32 AM    CALCIUM 9.6 12/02/2022 11:32 AM    GFRAA >60 05/06/2022 01:56 PM    LABGLOM >60 12/02/2022 11:32 AM    GLUCOSE 150 12/02/2022 11:32 AM     XR CHEST PORTABLE   Final Result   Cardiomegaly with pulmonary vascular congestion and diffuse interstitial and   alveolar opacities. This could represent interstitial edema as well as an   atypical/viral infection. Chest Xray: As above  EKG: NSR  I visualized CXR images and EKG strips. Problem List  Active Problems:    * No active hospital problems. *  Resolved Problems:    * No resolved hospital problems. *        Assessment/Plan:   Acute on chronic hypoxic respiratory failure due to suspected COVID infection:  CXR with bilateral infiltrates. Unable to rule out superimposed bacterial pneumonia. Patient refused CT chest.  .3. Started on Baricitinib & IV Decadron. Respiratory viral panel pending. Procalcitonin 0.06; hold off antibiotics for now. Continue bronchodilators. Follow-up strep and Legionella antigen, sputum cultures. D-dimer 0.60; continue prophylactic Lovenox. Acute on Chronic combined systolic and diastolic HF:  Troponin negative x1. Continue CHF protocol with IV Lasix. Follow-up echocardiogram.  CHF coordinator consult. Continue home medications. TRUE: CPAP      T2 DM not on long-term insulin:  Hold oral medications. Follow-up A1c. Monitor BG on basal insulin and SSI      Morbid obesity:Body mass index is 77.93 kg/m². BMI Complicating assessment and treatment.  Placing patient at risk for multiple co-morbidities as well as early death and contributing to the patient's presentation. Education, and counseling provided. DVT prophylaxis: Lovenox  Code status: Full code  Diet: Regular  IV access: Peripheral  Guerrero Catheter: None    Admit as inpatient. I anticipate hospitalization spanning more than two midnights for investigation and treatment of the above medically necessary diagnoses. Please note that some part of this chart was generated using Dragon dictation software. Although every effort was made to ensure the accuracy of this automated transcription, some errors in transcription may have occurred inadvertently. If you may need any clarification, please do not hesitate to contact me through [facility].        Erica Milner MD    12/2/2022 2:09 PM

## 2022-12-03 LAB
A/G RATIO: 0.8 (ref 1.1–2.2)
ALBUMIN SERPL-MCNC: 3 G/DL (ref 3.4–5)
ALP BLD-CCNC: 107 U/L (ref 40–129)
ALT SERPL-CCNC: 19 U/L (ref 10–40)
ANION GAP SERPL CALCULATED.3IONS-SCNC: 8 MMOL/L (ref 3–16)
AST SERPL-CCNC: 20 U/L (ref 15–37)
BASOPHILS ABSOLUTE: 0 K/UL (ref 0–0.2)
BASOPHILS RELATIVE PERCENT: 0.1 %
BILIRUB SERPL-MCNC: 0.3 MG/DL (ref 0–1)
BUN BLDV-MCNC: 16 MG/DL (ref 7–20)
CALCIUM SERPL-MCNC: 9.6 MG/DL (ref 8.3–10.6)
CHLORIDE BLD-SCNC: 97 MMOL/L (ref 99–110)
CO2: 35 MMOL/L (ref 21–32)
CREAT SERPL-MCNC: 0.9 MG/DL (ref 0.6–1.1)
EKG ATRIAL RATE: 88 BPM
EKG DIAGNOSIS: NORMAL
EKG P AXIS: 33 DEGREES
EKG P-R INTERVAL: 158 MS
EKG Q-T INTERVAL: 362 MS
EKG QRS DURATION: 94 MS
EKG QTC CALCULATION (BAZETT): 438 MS
EKG R AXIS: 18 DEGREES
EKG T AXIS: 43 DEGREES
EKG VENTRICULAR RATE: 88 BPM
EOSINOPHILS ABSOLUTE: 0 K/UL (ref 0–0.6)
EOSINOPHILS RELATIVE PERCENT: 0 %
FOLATE: 13.69 NG/ML (ref 4.78–24.2)
GFR SERPL CREATININE-BSD FRML MDRD: >60 ML/MIN/{1.73_M2}
GLUCOSE BLD-MCNC: 159 MG/DL (ref 70–99)
GLUCOSE BLD-MCNC: 167 MG/DL (ref 70–99)
GLUCOSE BLD-MCNC: 200 MG/DL (ref 70–99)
GLUCOSE BLD-MCNC: 203 MG/DL (ref 70–99)
GLUCOSE BLD-MCNC: 274 MG/DL (ref 70–99)
HCT VFR BLD CALC: 34.9 % (ref 36–48)
HEMOGLOBIN: 10.4 G/DL (ref 12–16)
L. PNEUMOPHILA SEROGP 1 UR AG: NORMAL
LYMPHOCYTES ABSOLUTE: 0.9 K/UL (ref 1–5.1)
LYMPHOCYTES RELATIVE PERCENT: 31.1 %
MAGNESIUM: 2.2 MG/DL (ref 1.8–2.4)
MCH RBC QN AUTO: 25.5 PG (ref 26–34)
MCHC RBC AUTO-ENTMCNC: 29.7 G/DL (ref 31–36)
MCV RBC AUTO: 85.9 FL (ref 80–100)
MONOCYTES ABSOLUTE: 0.3 K/UL (ref 0–1.3)
MONOCYTES RELATIVE PERCENT: 10.4 %
NEUTROPHILS ABSOLUTE: 1.7 K/UL (ref 1.7–7.7)
NEUTROPHILS RELATIVE PERCENT: 58.4 %
ORGANISM: ABNORMAL
PDW BLD-RTO: 17.1 % (ref 12.4–15.4)
PERFORMED ON: ABNORMAL
PHOSPHORUS: 3.7 MG/DL (ref 2.5–4.9)
PLATELET # BLD: 188 K/UL (ref 135–450)
PMV BLD AUTO: 7.9 FL (ref 5–10.5)
POTASSIUM SERPL-SCNC: 4.6 MMOL/L (ref 3.5–5.1)
RBC # BLD: 4.06 M/UL (ref 4–5.2)
REPORT: NORMAL
RESPIRATORY PANEL PCR: ABNORMAL
SODIUM BLD-SCNC: 140 MMOL/L (ref 136–145)
STREP PNEUMONIAE ANTIGEN, URINE: NORMAL
TOTAL PROTEIN: 6.9 G/DL (ref 6.4–8.2)
VITAMIN B-12: 1235 PG/ML (ref 211–911)
VITAMIN D 25-HYDROXY: 30.1 NG/ML
WBC # BLD: 3 K/UL (ref 4–11)

## 2022-12-03 PROCEDURE — 6370000000 HC RX 637 (ALT 250 FOR IP): Performed by: INTERNAL MEDICINE

## 2022-12-03 PROCEDURE — 85025 COMPLETE CBC W/AUTO DIFF WBC: CPT

## 2022-12-03 PROCEDURE — 93010 ELECTROCARDIOGRAM REPORT: CPT | Performed by: INTERNAL MEDICINE

## 2022-12-03 PROCEDURE — 83735 ASSAY OF MAGNESIUM: CPT

## 2022-12-03 PROCEDURE — 2700000000 HC OXYGEN THERAPY PER DAY

## 2022-12-03 PROCEDURE — 94761 N-INVAS EAR/PLS OXIMETRY MLT: CPT

## 2022-12-03 PROCEDURE — 87070 CULTURE OTHR SPECIMN AEROBIC: CPT

## 2022-12-03 PROCEDURE — 1200000000 HC SEMI PRIVATE

## 2022-12-03 PROCEDURE — 6360000002 HC RX W HCPCS: Performed by: INTERNAL MEDICINE

## 2022-12-03 PROCEDURE — 84100 ASSAY OF PHOSPHORUS: CPT

## 2022-12-03 PROCEDURE — 36415 COLL VENOUS BLD VENIPUNCTURE: CPT

## 2022-12-03 PROCEDURE — 82306 VITAMIN D 25 HYDROXY: CPT

## 2022-12-03 PROCEDURE — 80053 COMPREHEN METABOLIC PANEL: CPT

## 2022-12-03 PROCEDURE — 87205 SMEAR GRAM STAIN: CPT

## 2022-12-03 PROCEDURE — 94640 AIRWAY INHALATION TREATMENT: CPT

## 2022-12-03 PROCEDURE — 2580000003 HC RX 258: Performed by: INTERNAL MEDICINE

## 2022-12-03 RX ORDER — DEXAMETHASONE SODIUM PHOSPHATE 10 MG/ML
8 INJECTION, SOLUTION INTRAMUSCULAR; INTRAVENOUS EVERY 24 HOURS
Status: DISCONTINUED | OUTPATIENT
Start: 2022-12-03 | End: 2022-12-04

## 2022-12-03 RX ORDER — ALBUTEROL SULFATE 90 UG/1
2 AEROSOL, METERED RESPIRATORY (INHALATION)
Status: DISCONTINUED | OUTPATIENT
Start: 2022-12-03 | End: 2022-12-05 | Stop reason: HOSPADM

## 2022-12-03 RX ORDER — POLYVINYL ALCOHOL 14 MG/ML
1 SOLUTION/ DROPS OPHTHALMIC PRN
Status: DISCONTINUED | OUTPATIENT
Start: 2022-12-03 | End: 2022-12-05 | Stop reason: HOSPADM

## 2022-12-03 RX ORDER — INSULIN GLARGINE 100 [IU]/ML
20 INJECTION, SOLUTION SUBCUTANEOUS NIGHTLY
Status: DISCONTINUED | OUTPATIENT
Start: 2022-12-03 | End: 2022-12-05 | Stop reason: HOSPADM

## 2022-12-03 RX ORDER — CARVEDILOL 6.25 MG/1
6.25 TABLET ORAL 2 TIMES DAILY
Status: DISCONTINUED | OUTPATIENT
Start: 2022-12-04 | End: 2022-12-05 | Stop reason: HOSPADM

## 2022-12-03 RX ORDER — FUROSEMIDE 40 MG/1
40 TABLET ORAL 2 TIMES DAILY
Status: DISCONTINUED | OUTPATIENT
Start: 2022-12-03 | End: 2022-12-05 | Stop reason: HOSPADM

## 2022-12-03 RX ORDER — KETOCONAZOLE 20 MG/G
CREAM TOPICAL 2 TIMES DAILY PRN
Status: DISCONTINUED | OUTPATIENT
Start: 2022-12-03 | End: 2022-12-05 | Stop reason: HOSPADM

## 2022-12-03 RX ADMIN — CARVEDILOL 12.5 MG: 6.25 TABLET, FILM COATED ORAL at 08:05

## 2022-12-03 RX ADMIN — FUROSEMIDE 40 MG: 40 TABLET ORAL at 16:51

## 2022-12-03 RX ADMIN — FLUTICASONE PROPIONATE 2 SPRAY: 50 SPRAY, METERED NASAL at 08:05

## 2022-12-03 RX ADMIN — Medication 2 PUFF: at 15:50

## 2022-12-03 RX ADMIN — FERROUS SULFATE TAB 325 MG (65 MG ELEMENTAL FE) 325 MG: 325 (65 FE) TAB at 11:09

## 2022-12-03 RX ADMIN — INSULIN GLARGINE 20 UNITS: 100 INJECTION, SOLUTION SUBCUTANEOUS at 21:28

## 2022-12-03 RX ADMIN — BARICITINIB 4 MG: 2 TABLET, FILM COATED ORAL at 16:51

## 2022-12-03 RX ADMIN — Medication 2 PUFF: at 11:34

## 2022-12-03 RX ADMIN — ASPIRIN 81 MG 81 MG: 81 TABLET ORAL at 11:09

## 2022-12-03 RX ADMIN — Medication 10 ML: at 21:28

## 2022-12-03 RX ADMIN — Medication 2 PUFF: at 19:48

## 2022-12-03 RX ADMIN — ENOXAPARIN SODIUM 60 MG: 100 INJECTION SUBCUTANEOUS at 08:07

## 2022-12-03 RX ADMIN — IRON SUCROSE 200 MG: 20 INJECTION, SOLUTION INTRAVENOUS at 14:57

## 2022-12-03 RX ADMIN — DEXAMETHASONE SODIUM PHOSPHATE 8 MG: 10 INJECTION INTRAMUSCULAR; INTRAVENOUS at 15:07

## 2022-12-03 RX ADMIN — Medication 10 ML: at 09:27

## 2022-12-03 RX ADMIN — Medication 2 PUFF: at 19:47

## 2022-12-03 RX ADMIN — FUROSEMIDE 40 MG: 40 TABLET ORAL at 08:05

## 2022-12-03 RX ADMIN — ENOXAPARIN SODIUM 60 MG: 100 INJECTION SUBCUTANEOUS at 21:28

## 2022-12-03 RX ADMIN — DESMOPRESSIN ACETATE 40 MG: 0.2 TABLET ORAL at 11:09

## 2022-12-03 RX ADMIN — GUAIFENESIN AND DEXTROMETHORPHAN 10 ML: 100; 10 SYRUP ORAL at 08:12

## 2022-12-03 RX ADMIN — SPIRONOLACTONE 25 MG: 25 TABLET ORAL at 11:09

## 2022-12-03 ASSESSMENT — PAIN SCALES - GENERAL
PAINLEVEL_OUTOF10: 0

## 2022-12-03 NOTE — RT PROTOCOL NOTE
RT Nebulizer Bronchodilator Protocol Note    There is a bronchodilator order in the chart from a provider indicating to follow the RT Bronchodilator Protocol and there is an Initiate RT Bronchodilator Protocol order as well (see protocol at bottom of note). CXR Findings:  XR CHEST PORTABLE    Result Date: 12/2/2022  Cardiomegaly with pulmonary vascular congestion and diffuse interstitial and alveolar opacities. This could represent interstitial edema as well as an atypical/viral infection. The findings from the last RT Protocol Assessment were as follows:  Smoking: Chronic pulmonary disease  Respiratory Pattern: Regular pattern and RR 12-20 bpm  Breath Sounds: Slightly diminished and/or crackles  Cough: Strong, spontaneous, non-productive  Indication for Bronchodilator Therapy: Decreased or absent breath sounds  Bronchodilator Assessment Score: 4    Aerosolized bronchodilator medication orders have been revised according to the RT Nebulizer Bronchodilator Protocol below. Respiratory Therapist to perform RT Therapy Protocol Assessment initially then follow the protocol. Repeat RT Therapy Protocol Assessment PRN for score 0-3 or on second treatment, BID, and PRN for scores above 3. No Indications - adjust the frequency to every 6 hours PRN wheezing or bronchospasm, if no treatments needed after 48 hours then discontinue using Per Protocol order mode. If indication present, adjust the RT bronchodilator orders based on the Bronchodilator Assessment Score as indicated below. If a patient is on this medication at home then do not decrease Frequency below that used at home. 0-3 - enter or revise RT bronchodilator order(s) to equivalent RT Bronchodilator order with Frequency of every 4 hours PRN for wheezing or increased work of breathing using Per Protocol order mode.        4-6 - enter or revise RT Bronchodilator order(s) to two equivalent RT bronchodilator orders with one order with BID Frequency and one order with Frequency of every 4 hours PRN wheezing or increased work of breathing using Per Protocol order mode. 7-10 - enter or revise RT Bronchodilator order(s) to two equivalent RT bronchodilator orders with one order with TID Frequency and one order with Frequency of every 4 hours PRN wheezing or increased work of breathing using Per Protocol order mode. 11-13 - enter or revise RT Bronchodilator order(s) to one equivalent RT bronchodilator order with QID Frequency and an Albuterol order with Frequency of every 4 hours PRN wheezing or increased work of breathing using Per Protocol order mode. Greater than 13 - enter or revise RT Bronchodilator order(s) to one equivalent RT bronchodilator order with every 4 hours Frequency and an Albuterol order with Frequency of every 2 hours PRN wheezing or increased work of breathing using Per Protocol order mode. RT to enter RT Home Evaluation for COPD & MDI Assessment order using Per Protocol order mode.     Electronically signed by Millie Resendiz RCP on 12/2/2022 at 7:52 PM

## 2022-12-04 ENCOUNTER — APPOINTMENT (OUTPATIENT)
Dept: GENERAL RADIOLOGY | Age: 52
DRG: 177 | End: 2022-12-04
Payer: COMMERCIAL

## 2022-12-04 LAB
A/G RATIO: 0.9 (ref 1.1–2.2)
ALBUMIN SERPL-MCNC: 3.4 G/DL (ref 3.4–5)
ALP BLD-CCNC: 107 U/L (ref 40–129)
ALT SERPL-CCNC: 24 U/L (ref 10–40)
ANION GAP SERPL CALCULATED.3IONS-SCNC: 7 MMOL/L (ref 3–16)
AST SERPL-CCNC: 24 U/L (ref 15–37)
BASOPHILS ABSOLUTE: 0 K/UL (ref 0–0.2)
BASOPHILS RELATIVE PERCENT: 0.6 %
BILIRUB SERPL-MCNC: 0.3 MG/DL (ref 0–1)
BUN BLDV-MCNC: 21 MG/DL (ref 7–20)
C-REACTIVE PROTEIN: 51.4 MG/L (ref 0–5.1)
CALCIUM SERPL-MCNC: 9.6 MG/DL (ref 8.3–10.6)
CHLORIDE BLD-SCNC: 97 MMOL/L (ref 99–110)
CO2: 39 MMOL/L (ref 21–32)
CREAT SERPL-MCNC: 1 MG/DL (ref 0.6–1.1)
D DIMER: 0.57 UG/ML FEU (ref 0–0.6)
EOSINOPHILS ABSOLUTE: 0 K/UL (ref 0–0.6)
EOSINOPHILS RELATIVE PERCENT: 0 %
GFR SERPL CREATININE-BSD FRML MDRD: >60 ML/MIN/{1.73_M2}
GLUCOSE BLD-MCNC: 131 MG/DL (ref 70–99)
GLUCOSE BLD-MCNC: 144 MG/DL (ref 70–99)
GLUCOSE BLD-MCNC: 162 MG/DL (ref 70–99)
GLUCOSE BLD-MCNC: 178 MG/DL (ref 70–99)
GLUCOSE BLD-MCNC: 209 MG/DL (ref 70–99)
HCT VFR BLD CALC: 34.6 % (ref 36–48)
HEMOGLOBIN: 10.6 G/DL (ref 12–16)
LYMPHOCYTES ABSOLUTE: 1.2 K/UL (ref 1–5.1)
LYMPHOCYTES RELATIVE PERCENT: 26.1 %
MAGNESIUM: 2.2 MG/DL (ref 1.8–2.4)
MCH RBC QN AUTO: 26.3 PG (ref 26–34)
MCHC RBC AUTO-ENTMCNC: 30.6 G/DL (ref 31–36)
MCV RBC AUTO: 86 FL (ref 80–100)
MONOCYTES ABSOLUTE: 0.4 K/UL (ref 0–1.3)
MONOCYTES RELATIVE PERCENT: 7.7 %
NEUTROPHILS ABSOLUTE: 3.1 K/UL (ref 1.7–7.7)
NEUTROPHILS RELATIVE PERCENT: 65.6 %
PDW BLD-RTO: 16.6 % (ref 12.4–15.4)
PERFORMED ON: ABNORMAL
PHOSPHORUS: 4 MG/DL (ref 2.5–4.9)
PLATELET # BLD: 228 K/UL (ref 135–450)
PMV BLD AUTO: 7.9 FL (ref 5–10.5)
POTASSIUM SERPL-SCNC: 5 MMOL/L (ref 3.5–5.1)
RBC # BLD: 4.02 M/UL (ref 4–5.2)
SODIUM BLD-SCNC: 143 MMOL/L (ref 136–145)
TOTAL PROTEIN: 7.2 G/DL (ref 6.4–8.2)
WBC # BLD: 4.7 K/UL (ref 4–11)

## 2022-12-04 PROCEDURE — 6370000000 HC RX 637 (ALT 250 FOR IP): Performed by: INTERNAL MEDICINE

## 2022-12-04 PROCEDURE — 80053 COMPREHEN METABOLIC PANEL: CPT

## 2022-12-04 PROCEDURE — 1200000000 HC SEMI PRIVATE

## 2022-12-04 PROCEDURE — 71046 X-RAY EXAM CHEST 2 VIEWS: CPT

## 2022-12-04 PROCEDURE — 6360000002 HC RX W HCPCS: Performed by: INTERNAL MEDICINE

## 2022-12-04 PROCEDURE — 94761 N-INVAS EAR/PLS OXIMETRY MLT: CPT

## 2022-12-04 PROCEDURE — 94640 AIRWAY INHALATION TREATMENT: CPT

## 2022-12-04 PROCEDURE — 85025 COMPLETE CBC W/AUTO DIFF WBC: CPT

## 2022-12-04 PROCEDURE — 86140 C-REACTIVE PROTEIN: CPT

## 2022-12-04 PROCEDURE — 85379 FIBRIN DEGRADATION QUANT: CPT

## 2022-12-04 PROCEDURE — 83735 ASSAY OF MAGNESIUM: CPT

## 2022-12-04 PROCEDURE — 2580000003 HC RX 258: Performed by: INTERNAL MEDICINE

## 2022-12-04 PROCEDURE — 36415 COLL VENOUS BLD VENIPUNCTURE: CPT

## 2022-12-04 PROCEDURE — 84100 ASSAY OF PHOSPHORUS: CPT

## 2022-12-04 PROCEDURE — 2700000000 HC OXYGEN THERAPY PER DAY

## 2022-12-04 RX ORDER — DEXAMETHASONE SODIUM PHOSPHATE 10 MG/ML
6 INJECTION, SOLUTION INTRAMUSCULAR; INTRAVENOUS EVERY 24 HOURS
Status: DISCONTINUED | OUTPATIENT
Start: 2022-12-04 | End: 2022-12-05 | Stop reason: HOSPADM

## 2022-12-04 RX ORDER — FUROSEMIDE 10 MG/ML
40 INJECTION INTRAMUSCULAR; INTRAVENOUS ONCE
Status: COMPLETED | OUTPATIENT
Start: 2022-12-04 | End: 2022-12-04

## 2022-12-04 RX ADMIN — Medication 10 ML: at 20:21

## 2022-12-04 RX ADMIN — Medication 2 PUFF: at 15:35

## 2022-12-04 RX ADMIN — DEXAMETHASONE SODIUM PHOSPHATE 6 MG: 10 INJECTION, SOLUTION INTRAMUSCULAR; INTRAVENOUS at 17:06

## 2022-12-04 RX ADMIN — FLUTICASONE PROPIONATE 2 SPRAY: 50 SPRAY, METERED NASAL at 08:14

## 2022-12-04 RX ADMIN — SPIRONOLACTONE 25 MG: 25 TABLET ORAL at 08:08

## 2022-12-04 RX ADMIN — Medication 2 PUFF: at 21:18

## 2022-12-04 RX ADMIN — Medication 2 PUFF: at 07:25

## 2022-12-04 RX ADMIN — CARVEDILOL 6.25 MG: 6.25 TABLET, FILM COATED ORAL at 20:21

## 2022-12-04 RX ADMIN — Medication 2 PUFF: at 21:19

## 2022-12-04 RX ADMIN — ENOXAPARIN SODIUM 60 MG: 100 INJECTION SUBCUTANEOUS at 08:08

## 2022-12-04 RX ADMIN — BARICITINIB 4 MG: 2 TABLET, FILM COATED ORAL at 18:41

## 2022-12-04 RX ADMIN — CARVEDILOL 6.25 MG: 6.25 TABLET, FILM COATED ORAL at 08:08

## 2022-12-04 RX ADMIN — IRON SUCROSE 200 MG: 20 INJECTION, SOLUTION INTRAVENOUS at 12:26

## 2022-12-04 RX ADMIN — ASPIRIN 81 MG 81 MG: 81 TABLET ORAL at 08:08

## 2022-12-04 RX ADMIN — ENOXAPARIN SODIUM 60 MG: 100 INJECTION SUBCUTANEOUS at 20:21

## 2022-12-04 RX ADMIN — Medication 10 ML: at 08:14

## 2022-12-04 RX ADMIN — Medication 2 PUFF: at 11:13

## 2022-12-04 RX ADMIN — FUROSEMIDE 40 MG: 10 INJECTION, SOLUTION INTRAMUSCULAR; INTRAVENOUS at 17:06

## 2022-12-04 RX ADMIN — INSULIN GLARGINE 20 UNITS: 100 INJECTION, SOLUTION SUBCUTANEOUS at 20:21

## 2022-12-04 RX ADMIN — FUROSEMIDE 40 MG: 40 TABLET ORAL at 08:08

## 2022-12-04 RX ADMIN — DESMOPRESSIN ACETATE 40 MG: 0.2 TABLET ORAL at 08:08

## 2022-12-04 ASSESSMENT — PAIN SCALES - GENERAL
PAINLEVEL_OUTOF10: 0

## 2022-12-04 NOTE — PROGRESS NOTES
Hospitalist Progress Note      PCP: Emmanuel Oliveira    Date of Admission: 12/2/2022    Chief Complaint: Shortness of breath    Hospital Course:  Rachael Mckoy is a 45 y.o. F with h/o chronic HFpEF, morbid obesity, chronic hypoxic respiratory failure on 3 L NC at baseline, TRUE, asthma, HTN, and DM who presented with c/o worsening dyspnea, malaise, cough productive of yellowish sputum and blood-tinged for 2 days. She had tested positive for COVID at home 5 days ago. She was also hypoxic at home with O2 sats in the low 70s on 3 L. She denied any fevers, chest pain, worsening lower extremity edema or orthopnea. In the ED chest x-ray showed cardiomegaly and pulmonary vascular congestion with bilateral lung infiltrates concerning for edema/viral or atypical pneumonia. Subjective: Patient seen and examined. Breathing improved  Some hemoptysis small  Bone baseline 3 L oxygen  No chest pain        Medications:  Reviewed      Intake/Output Summary (Last 24 hours) at 12/4/2022 1623  Last data filed at 12/4/2022 1232  Gross per 24 hour   Intake 400 ml   Output 1325 ml   Net -925 ml         Physical Exam Performed:    /80   Pulse 74   Temp 97.1 °F (36.2 °C) (Temporal)   Resp 16   Ht 5' 4\" (1.626 m)   Wt (!) 454 lb 12.9 oz (206.3 kg)   LMP  (LMP Unknown)   SpO2 97%   BMI 78.07 kg/m²     General appearance: No apparent distress, appears stated age and cooperative. HEENT: Pupils equal, round, and reactive to light. Conjunctivae/corneas clear. Neck: Supple, with full range of motion. No jugular venous distention. Trachea midline. Respiratory:  Normal respiratory effort. Decreased AE bilaterally. Cardiovascular: Regular rate and rhythm with normal S1/S2 without murmurs, rubs or gallops. Abdomen: Soft, non-tender, non-distended with normal bowel sounds. Musculoskeletal: No clubbing, cyanosis or edema bilaterally. Full range of motion without deformity.   Skin: Skin color, texture, turgor normal. No rashes or lesions. Neurologic:  Neurovascularly intact without any focal sensory/motor deficits. Cranial nerves: II-XII intact, grossly non-focal.        Labs:   Recent Labs     12/02/22 1132 12/03/22 0545 12/04/22 0441   WBC 4.3 3.0* 4.7   HGB 11.1* 10.4* 10.6*   HCT 36.3 34.9* 34.6*    188 228       Recent Labs     12/02/22  1132 12/02/22 2038 12/03/22 0545 12/04/22 0441     --  140 143   K 4.5 4.6 4.6 5.0   CL 97*  --  97* 97*   CO2 36*  --  35* 39*   BUN 9  --  16 21*   CREATININE 0.8  --  0.9 1.0   CALCIUM 9.6  --  9.6 9.6   PHOS  --   --  3.7 4.0       Recent Labs     12/02/22 1132 12/03/22 0545 12/04/22 0441   AST 22 20 24   ALT 21 19 24   BILITOT 0.6 0.3 0.3   ALKPHOS 121 107 107       No results for input(s): INR in the last 72 hours. Recent Labs     12/02/22 1132   TROPONINI <0.01         Urinalysis:      Lab Results   Component Value Date/Time    NITRU Negative 04/09/2015 07:30 AM    BLOODU Negative 04/09/2015 07:30 AM    SPECGRAV 1.020 04/09/2015 07:30 AM    GLUCOSEU Negative 04/09/2015 07:30 AM       Radiology:  XR CHEST (2 VW)   Final Result   Slightly improved aeration in the lungs with diffuse ground-glass opacities   remaining. XR CHEST PORTABLE   Final Result   Cardiomegaly with pulmonary vascular congestion and diffuse interstitial and   alveolar opacities. This could represent interstitial edema as well as an   atypical/viral infection. None    Assessment/Plan:    Active Hospital Problems    Diagnosis     Acute on chronic respiratory failure with hypoxemia (HCC) [J96.21]      Priority: Medium       Acute on chronic hypoxic respiratory failure due to COVID Pneumonia:  CXR with bilateral infiltrates. . 3. Now down to mid 50s started on Baricitinib & IV Decadron. Respiratory viral panel positive for COVID 19. Procalcitonin 0.06; hold off antibiotics for now. Continue bronchodilators. D-dimer 0.60; continue prophylactic Lovenox.   She has some hemoptysis I do not think she has a pneumonia also negative D-dimer is reassuring that this is not PE  There may be some element of fluid overload/I will give her 1 more dose of IV Lasix  Chest x-ray finding seems to be consistent with COVID-19  On dexamethasone 6 mg IV        Chronic combined systolic and diastolic HF:  Troponin negative x1. Continue CHF protocol with PO Lasix. Follow-up repeat echocardiogram.  CHF coordinator consult. Continue home medications. 1 more dose of IV Lasix        TRUE: CPAP     Hypertension: Monitor BP on home medications. Anemia:  Work up c/w AoCD/NIMCO. Started on supplementation. Goal Hgb > 7 g/dl. T2 DM not on long-term insulin:  Hold oral medications. Follow-up A1c. Monitor BG on basal insulin and SSI        Morbid obesity:Body mass index is 77.93 kg/m². BMI Complicating assessment and treatment. Placing patient at risk for multiple co-morbidities as well as early death and contributing to the patient's presentation. Education, and counseling provided. If patient doing well will discharge tomorrow morning to complete a 7 more days of oral steroids       DVT Prophylaxis: Lovenox  Diet: ADULT DIET; Regular; 4 carb choices (60 gm/meal); Low Fat/Low Chol/High Fiber/2 gm Na  Code Status: Full Code  PT/OT Eval Status: independent    Dispo - Home once medically stable.       Denver Baldwin MD

## 2022-12-04 NOTE — PROGRESS NOTES
Pt transported to room 5904. VSS. Pt agreeable with plan of care. All belongings sent with pt. 5C Telemetry pack applied. Pt was transported via wheelchair.

## 2022-12-04 NOTE — PLAN OF CARE
Problem: Discharge Planning  Goal: Discharge to home or other facility with appropriate resources  Outcome: Progressing     Problem: Pain  Goal: Verbalizes/displays adequate comfort level or baseline comfort level  Outcome: Progressing     Problem: Neurosensory - Adult  Goal: Achieves maximal functionality and self care  Outcome: Progressing     Problem: Respiratory - Adult  Goal: Achieves optimal ventilation and oxygenation  Outcome: Progressing     Problem: Cardiovascular - Adult  Goal: Maintains optimal cardiac output and hemodynamic stability  Outcome: Progressing     Problem: Skin/Tissue Integrity - Adult  Goal: Skin integrity remains intact  Outcome: Progressing  Goal: Oral mucous membranes remain intact  Outcome: Progressing     Problem: Musculoskeletal - Adult  Goal: Return mobility to safest level of function  Outcome: Progressing  Goal: Maintain proper alignment of affected body part  Outcome: Progressing  Goal: Return ADL status to a safe level of function  Outcome: Progressing     Problem: Gastrointestinal - Adult  Goal: Maintains adequate nutritional intake  Outcome: Progressing     Problem: Genitourinary - Adult  Goal: Absence of urinary retention  Outcome: Progressing     Problem: Infection - Adult  Goal: Absence of infection at discharge  Outcome: Progressing  Goal: Absence of infection during hospitalization  Outcome: Progressing     Problem: Hematologic - Adult  Goal: Maintains hematologic stability  Outcome: Progressing     Problem: Skin/Tissue Integrity  Goal: Absence of new skin breakdown  Description: 1. Monitor for areas of redness and/or skin breakdown  2. Assess vascular access sites hourly  3. Every 4-6 hours minimum:  Change oxygen saturation probe site  4. Every 4-6 hours:  If on nasal continuous positive airway pressure, respiratory therapy assess nares and determine need for appliance change or resting period.   Outcome: Progressing     Problem: Safety - Adult  Goal: Free from fall injury  Outcome: Progressing

## 2022-12-04 NOTE — PROGRESS NOTES
Patient transferred from [facility] unit. Placed in bed, oriented to room, pt belomgings with pt including phone and . Patient with no c/o.

## 2022-12-05 VITALS
HEART RATE: 67 BPM | BODY MASS INDEX: 50.02 KG/M2 | HEIGHT: 64 IN | OXYGEN SATURATION: 95 % | SYSTOLIC BLOOD PRESSURE: 141 MMHG | TEMPERATURE: 97.7 F | WEIGHT: 293 LBS | DIASTOLIC BLOOD PRESSURE: 78 MMHG | RESPIRATION RATE: 17 BRPM

## 2022-12-05 LAB
A/G RATIO: 0.9 (ref 1.1–2.2)
ALBUMIN SERPL-MCNC: 3.5 G/DL (ref 3.4–5)
ALP BLD-CCNC: 102 U/L (ref 40–129)
ALT SERPL-CCNC: 25 U/L (ref 10–40)
ANION GAP SERPL CALCULATED.3IONS-SCNC: 2 MMOL/L (ref 3–16)
AST SERPL-CCNC: 21 U/L (ref 15–37)
BACTERIA: ABNORMAL /HPF
BASOPHILS ABSOLUTE: 0 K/UL (ref 0–0.2)
BASOPHILS RELATIVE PERCENT: 0.4 %
BILIRUB SERPL-MCNC: 0.4 MG/DL (ref 0–1)
BILIRUBIN URINE: NEGATIVE
BLOOD, URINE: ABNORMAL
BUN BLDV-MCNC: 22 MG/DL (ref 7–20)
C-REACTIVE PROTEIN: 40.1 MG/L (ref 0–5.1)
CALCIUM SERPL-MCNC: 9.7 MG/DL (ref 8.3–10.6)
CHLORIDE BLD-SCNC: 95 MMOL/L (ref 99–110)
CLARITY: CLEAR
CO2: 45 MMOL/L (ref 21–32)
COLOR: YELLOW
CREAT SERPL-MCNC: 1 MG/DL (ref 0.6–1.1)
CULTURE, RESPIRATORY: NORMAL
D DIMER: 0.56 UG/ML FEU (ref 0–0.6)
EOSINOPHILS ABSOLUTE: 0 K/UL (ref 0–0.6)
EOSINOPHILS RELATIVE PERCENT: 0.1 %
EPITHELIAL CELLS, UA: 2 /HPF (ref 0–5)
GFR SERPL CREATININE-BSD FRML MDRD: >60 ML/MIN/{1.73_M2}
GLUCOSE BLD-MCNC: 120 MG/DL (ref 70–99)
GLUCOSE BLD-MCNC: 131 MG/DL (ref 70–99)
GLUCOSE BLD-MCNC: 144 MG/DL (ref 70–99)
GLUCOSE BLD-MCNC: 160 MG/DL (ref 70–99)
GLUCOSE URINE: NEGATIVE MG/DL
GRAM STAIN RESULT: NORMAL
HCT VFR BLD CALC: 36.1 % (ref 36–48)
HEMOGLOBIN: 11 G/DL (ref 12–16)
HYALINE CASTS: 0 /LPF (ref 0–8)
KETONES, URINE: NEGATIVE MG/DL
LEUKOCYTE ESTERASE, URINE: NEGATIVE
LYMPHOCYTES ABSOLUTE: 1.3 K/UL (ref 1–5.1)
LYMPHOCYTES RELATIVE PERCENT: 25.2 %
MAGNESIUM: 2.2 MG/DL (ref 1.8–2.4)
MCH RBC QN AUTO: 26.4 PG (ref 26–34)
MCHC RBC AUTO-ENTMCNC: 30.6 G/DL (ref 31–36)
MCV RBC AUTO: 86.2 FL (ref 80–100)
MICROSCOPIC EXAMINATION: YES
MONOCYTES ABSOLUTE: 0.4 K/UL (ref 0–1.3)
MONOCYTES RELATIVE PERCENT: 6.9 %
NEUTROPHILS ABSOLUTE: 3.5 K/UL (ref 1.7–7.7)
NEUTROPHILS RELATIVE PERCENT: 67.4 %
NITRITE, URINE: NEGATIVE
PDW BLD-RTO: 17 % (ref 12.4–15.4)
PERFORMED ON: ABNORMAL
PH UA: 6 (ref 5–8)
PHOSPHORUS: 4.5 MG/DL (ref 2.5–4.9)
PLATELET # BLD: 254 K/UL (ref 135–450)
PMV BLD AUTO: 8.3 FL (ref 5–10.5)
POTASSIUM SERPL-SCNC: 4.9 MMOL/L (ref 3.5–5.1)
PROTEIN UA: NEGATIVE MG/DL
RBC # BLD: 4.19 M/UL (ref 4–5.2)
RBC UA: 410 /HPF (ref 0–4)
SODIUM BLD-SCNC: 142 MMOL/L (ref 136–145)
SPECIFIC GRAVITY UA: 1.01 (ref 1–1.03)
TOTAL PROTEIN: 7.2 G/DL (ref 6.4–8.2)
URINE TYPE: ABNORMAL
UROBILINOGEN, URINE: 1 E.U./DL
WBC # BLD: 5.1 K/UL (ref 4–11)
WBC UA: 1 /HPF (ref 0–5)

## 2022-12-05 PROCEDURE — 94761 N-INVAS EAR/PLS OXIMETRY MLT: CPT

## 2022-12-05 PROCEDURE — 85025 COMPLETE CBC W/AUTO DIFF WBC: CPT

## 2022-12-05 PROCEDURE — 83735 ASSAY OF MAGNESIUM: CPT

## 2022-12-05 PROCEDURE — 2700000000 HC OXYGEN THERAPY PER DAY

## 2022-12-05 PROCEDURE — 6370000000 HC RX 637 (ALT 250 FOR IP): Performed by: INTERNAL MEDICINE

## 2022-12-05 PROCEDURE — 2580000003 HC RX 258: Performed by: INTERNAL MEDICINE

## 2022-12-05 PROCEDURE — 94640 AIRWAY INHALATION TREATMENT: CPT

## 2022-12-05 PROCEDURE — 97535 SELF CARE MNGMENT TRAINING: CPT

## 2022-12-05 PROCEDURE — 97116 GAIT TRAINING THERAPY: CPT

## 2022-12-05 PROCEDURE — 97161 PT EVAL LOW COMPLEX 20 MIN: CPT

## 2022-12-05 PROCEDURE — 81001 URINALYSIS AUTO W/SCOPE: CPT

## 2022-12-05 PROCEDURE — 36415 COLL VENOUS BLD VENIPUNCTURE: CPT

## 2022-12-05 PROCEDURE — 80053 COMPREHEN METABOLIC PANEL: CPT

## 2022-12-05 PROCEDURE — 87086 URINE CULTURE/COLONY COUNT: CPT

## 2022-12-05 PROCEDURE — 86140 C-REACTIVE PROTEIN: CPT

## 2022-12-05 PROCEDURE — 84100 ASSAY OF PHOSPHORUS: CPT

## 2022-12-05 PROCEDURE — 97530 THERAPEUTIC ACTIVITIES: CPT

## 2022-12-05 PROCEDURE — 6360000002 HC RX W HCPCS: Performed by: INTERNAL MEDICINE

## 2022-12-05 PROCEDURE — 85379 FIBRIN DEGRADATION QUANT: CPT

## 2022-12-05 PROCEDURE — C8929 TTE W OR WO FOL WCON,DOPPLER: HCPCS

## 2022-12-05 PROCEDURE — 97165 OT EVAL LOW COMPLEX 30 MIN: CPT

## 2022-12-05 RX ORDER — SULFAMETHOXAZOLE AND TRIMETHOPRIM 800; 160 MG/1; MG/1
1 TABLET ORAL 2 TIMES DAILY
Qty: 6 TABLET | Refills: 0 | Status: SHIPPED | OUTPATIENT
Start: 2022-12-05 | End: 2022-12-08

## 2022-12-05 RX ORDER — SULFAMETHOXAZOLE AND TRIMETHOPRIM 800; 160 MG/1; MG/1
1 TABLET ORAL ONCE
Status: COMPLETED | OUTPATIENT
Start: 2022-12-05 | End: 2022-12-05

## 2022-12-05 RX ORDER — GUAIFENESIN/DEXTROMETHORPHAN 100-10MG/5
10 SYRUP ORAL EVERY 4 HOURS PRN
Qty: 120 ML | Refills: 0 | Status: SHIPPED | OUTPATIENT
Start: 2022-12-05 | End: 2022-12-15

## 2022-12-05 RX ORDER — DEXAMETHASONE 6 MG/1
6 TABLET ORAL
Qty: 7 TABLET | Refills: 0 | Status: SHIPPED | OUTPATIENT
Start: 2022-12-05 | End: 2022-12-12

## 2022-12-05 RX ADMIN — FUROSEMIDE 40 MG: 40 TABLET ORAL at 09:38

## 2022-12-05 RX ADMIN — Medication 2 PUFF: at 12:30

## 2022-12-05 RX ADMIN — BARICITINIB 4 MG: 2 TABLET, FILM COATED ORAL at 16:47

## 2022-12-05 RX ADMIN — DESMOPRESSIN ACETATE 40 MG: 0.2 TABLET ORAL at 09:38

## 2022-12-05 RX ADMIN — Medication 2 PUFF: at 07:35

## 2022-12-05 RX ADMIN — Medication 2 PUFF: at 15:40

## 2022-12-05 RX ADMIN — Medication 10 ML: at 09:39

## 2022-12-05 RX ADMIN — ASPIRIN 81 MG 81 MG: 81 TABLET ORAL at 09:38

## 2022-12-05 RX ADMIN — CARVEDILOL 6.25 MG: 6.25 TABLET, FILM COATED ORAL at 09:38

## 2022-12-05 RX ADMIN — FLUTICASONE PROPIONATE 2 SPRAY: 50 SPRAY, METERED NASAL at 09:38

## 2022-12-05 RX ADMIN — IRON SUCROSE 200 MG: 20 INJECTION, SOLUTION INTRAVENOUS at 14:26

## 2022-12-05 RX ADMIN — SULFAMETHOXAZOLE AND TRIMETHOPRIM 1 TABLET: 800; 160 TABLET ORAL at 16:47

## 2022-12-05 RX ADMIN — DEXAMETHASONE SODIUM PHOSPHATE 6 MG: 10 INJECTION, SOLUTION INTRAMUSCULAR; INTRAVENOUS at 14:21

## 2022-12-05 RX ADMIN — ENOXAPARIN SODIUM 60 MG: 100 INJECTION SUBCUTANEOUS at 09:38

## 2022-12-05 RX ADMIN — SPIRONOLACTONE 25 MG: 25 TABLET ORAL at 09:38

## 2022-12-05 ASSESSMENT — PAIN SCALES - GENERAL: PAINLEVEL_OUTOF10: 0

## 2022-12-05 NOTE — PROGRESS NOTES
Discharge instructions given including medications, f/u visits, and reason to call physician. Questions asked and answered. Verbalized understanding. Waiting on ride. Other Records (please specify)

## 2022-12-05 NOTE — DISCHARGE INSTR - COC
Continuity of Care Form    Patient Name: Wyatt Shepherd   :  1970  MRN:  [id number]    Admit date:  2022  Discharge date:  22    Code Status Order: Full Code   Advance Directives:     Admitting Physician:  Sandy Joy MD  PCP: Catia Milan    Discharging Nurse: Madi Marques. [facility] G8A-4820/0762-20  Discharging Unit Phone Number: [phone]    Emergency Contact:   Extended Emergency Contact Information  Primary Emergency Contact: [address] Phone: [phone]  Relation: Parent  Secondary Emergency Contact: Other,Other  Relation: Other    Past Surgical History:  History reviewed. No pertinent surgical history. Immunization History: There is no immunization history on file for this patient.     Active Problems:  Patient Active Problem List   Diagnosis Code    Shortness of breath R06.02    Chronic combined systolic and diastolic CHF (congestive heart failure) ([facility] I50.42    Essential hypertension I10    Morbid obesity with BMI of 70 and over, adult ([facility] E66.01, Z68.45    Obstructive sleep apnea (adult) (pediatric) G47.33    Acute pulmonary edema (HCC) Y22.7    Acute systolic heart failure (HCC) I50.21    Cardiomyopathy ([facility] I42.9    Diabetes mellitus ([facility] E11.9    Hypersomnia G47.10    Diabetes mellitus type 2 in obese ([facility] E11.69, E66.9    Mixed hyperlipidemia E78.2    Acute respiratory failure ([facility] J96.00    Acute on chronic respiratory failure with hypoxemia (HCC) J96.21       Isolation/Infection:   Isolation            Droplet Plus          Patient Infection Status       Infection Onset Added Last Indicated Last Indicated By Review Planned Expiration Resolved Resolved By    COVID-19 22 Respiratory Panel, Molecular, with COVID-19 (Restricted: peds pts or suitable admitted adults) 22      Resolved    COVID-19 (Rule Out) 22 Respiratory Panel, Molecular, with COVID-19 (Restricted: peds pts or suitable admitted adults) (Ordered)   12/03/22 Rule-Out Test Resulted    COVID-19 (Rule Out) 05/04/22 05/04/22 05/04/22 COVID-19 & Influenza Combo (Ordered)   05/04/22 Rule-Out Test Resulted            Nurse Assessment:  Last Vital Signs: BP (!) 172/76   Pulse 78   Temp (!) 96.5 °F (35.8 °C) (Temporal)   Resp 16   Ht 5' 4\" (1.626 m)   Wt (!) 452 lb 13.2 oz (205.4 kg)   LMP  (LMP Unknown)   SpO2 95%   BMI 77.73 kg/m²     Last documented pain score (0-10 scale): Pain Level: 0  Last Weight:   Wt Readings from Last 1 Encounters:   12/05/22 (!) 452 lb 13.2 oz (205.4 kg)     Mental Status:  oriented and alert    IV Access:  - None    Nursing Mobility/ADLs:  Walking   Assisted  Transfer  Assisted  Bathing  Assisted  Dressing  Assisted  Toileting  Assisted  Feeding  Independent  Med Admin  Independent  Med Delivery   whole    Wound Care Documentation and Therapy:        Elimination:  Continence: Bowel: Yes  Bladder: Yes  Urinary Catheter: None   Colostomy/Ileostomy/Ileal Conduit: No       Date of Last BM: 12/2/22    Intake/Output Summary (Last 24 hours) at 12/5/2022 1519  Last data filed at 12/5/2022 1445  Gross per 24 hour   Intake 480 ml   Output 1100 ml   Net -620 ml     I/O last 3 completed shifts: In: 640 [P.O.:640]  Out: 2425 [Urine:2425]    Safety Concerns: At Risk for Falls    Impairments/Disabilities:      Unsteady when walking. Nutrition Therapy:  Current Nutrition Therapy:   - Oral Diet:  General, Carb Control 4 carbs/meal (1800kcals/day), and Low Fat    Routes of Feeding: Oral  Liquids: No Restrictions  Daily Fluid Restriction: no  Last Modified Barium Swallow with Video (Video Swallowing Test): not done    Treatments at the Time of Hospital Discharge:   Respiratory Treatments: yes  Oxygen Therapy:  is on oxygen at 3 L/min per nasal cannula.   Ventilator:    - No ventilator support    Rehab Therapies: Physical Therapy and Occupational Therapy  Weight Bearing Status/Restrictions: No weight bearing restrictions  Other Medical Equipment (for information only, NOT a DME order):  wheelchair  Other Treatments:     Patient's personal belongings (please select all that are sent with patient):  Contact lenses, footwear, pants, shirt, socks, undergarment, jacket, watch, ring, cell phone, . RN SIGNATURE:  Electronically signed by Levern Sandifer, RN on 12/5/22 at 4:13 PM EST    CASE MANAGEMENT/SOCIAL WORK SECTION    Inpatient Status Date: 12/02/2022    Readmission Risk Assessment Score: 12%  Readmission Risk              Risk of Unplanned Readmission:  20           Discharging to Facility/ [address]  [address], [address]  Phone: [phone]  Fax: [phone]     / signature: Electronically signed by NARESH Murphy on 12/5/2022 at 4:03 PM      PHYSICIAN SECTION    Prognosis: Good    Condition at Discharge: Stable    Rehab Potential (if transferring to Rehab): Good    Recommended Labs or Other Treatments After Discharge: none     Physician Certification: I certify the above information and transfer of Eleuterio Bethea  is necessary for the continuing treatment of the diagnosis listed and that she requires Home Care for greater 30 days.      Update Admission H&P: No change in H&P    PHYSICIAN SIGNATURE:  Electronically signed by Dhiraj Dow MD on 12/5/22 at 3:19 PM EST

## 2022-12-05 NOTE — PLAN OF CARE
Problem: Discharge Planning  Goal: Discharge to home or other facility with appropriate resources  Outcome: Progressing     Problem: Pain  Goal: Verbalizes/displays adequate comfort level or baseline comfort level  Outcome: Progressing     Problem: Skin/Tissue Integrity - Adult  Goal: Skin integrity remains intact  Outcome: Progressing

## 2022-12-05 NOTE — CARE COORDINATION
Discharge Planning:     (KRISTYN) due to Patient having COVID-19 CM called Patient on her cell phone [phone]. The phone rang multiple times and went to . CM left a VM with her contact information asking for a phone call back to discuss PT/Ot's recommendation.      Electronically signed by Verneda Goldmann, MSW on 12/5/2022 at 2:47 PM

## 2022-12-05 NOTE — CARE COORDINATION
Discharge Planning:     (CM) reviewed the patient's chart to assess needs. Patient's Readmission Risk Score is 12%. Patient's medical insurance is [address]. Patient's PCP is Dr. Mita Brush. No needs anticipated, at this time. CM team to follow. Staff to inform CM if additional discharge needs arise. PT/OT evals still pending. Will await evals prior to discussing discharge plans with pt. Will follow up once therapy evals are complete and recommendations are rendered.       [address] [place] [address]  [phone]      Electronically signed by NARESH Mcguire on 12/5/2022 at 8:13 AM

## 2022-12-05 NOTE — CARE COORDINATION
Discharge Planning:     (CM) spoke with Pt over the phone and she is agreeable to [place] [address]. Pt fine with CM sending out referrals for her. CM called Flavio Hammans with [facility] ([phone]) who advised they would accept the Patient. Discharge note:      CM/SW has been notified of discharge. Patient noted to have the following needs at discharge. CM/SW has coordinated the following services: [facility] PT/OT and nursing. [address]  [address], [address]  Phone: [phone]  Fax: [phone]    Discharge Destination: Home  Transportation: Family        Comment: CM notified Patient nurse, Keshia Caldwell of the [place] [address]. All CM/SW needs met, will sign off.      Electronically signed by NARESH Mcdowell on 12/5/2022 at 4:05 PM

## 2022-12-05 NOTE — PROGRESS NOTES
Patient alert and oriented. Assessment completed. POC discussed. No acute events overnight.  Call light within reach

## 2022-12-05 NOTE — DISCHARGE SUMMARY
Patient: Sylvia Patel     Gender: female  : 1970   Age: 45 y.o. MRN: [id number]    Admitting Physician: Ira Brittle, MD  Discharge Physician: Janett Dawkins MD     Code Status: Full Code     Admit Date: 2022   Discharge Date:   2022    Disposition:  Home    Discharge Diagnoses:  Acute on chronic hypoxic respiratory failure secondary to COVID-19 pneumonia  Chronic combined systolic and diastolic heart failure  TRUE on CPAP  Hypertension  Suspected UTI causing some hematuria      Active Hospital Problems    Diagnosis Date Noted    Acute on chronic respiratory failure with hypoxemia [facility]) [J96.21] 2022     Priority: Medium       Follow-up appointments:  one week    Outpatient to do list: Follow-up with urology in 1 week time to ensure resolution of hematuria    Condition at Discharge:  Stable    Hospital Course:   Sylvia Patel is a 45 y.o. F with h/o chronic HFpEF, morbid obesity, chronic hypoxic respiratory failure on 3 L NC at baseline, TRUE, asthma, HTN, and DM who presented with c/o worsening dyspnea, malaise, cough productive of yellowish sputum and blood-tinged for 2 days. She had tested positive for COVID at home 5 days ago. She was also hypoxic at home with O2 sats in the low 70s on 3 L. She denied any fevers, chest pain, worsening lower extremity edema or orthopnea. In the ED chest x-ray showed cardiomegaly and pulmonary vascular congestion with bilateral lung infiltrates concerning for edema/viral or atypical pneumonia. Acute on chronic hypoxic respiratory failure due to COVID Pneumonia:  CXR with bilateral infiltrates. . 3. Now down to mid 50s started on Baricitinib & IV Decadron. Respiratory viral panel positive for COVID 19. Procalcitonin 0.06; hold off antibiotics for now. Continue bronchodilators. D-dimer 0.60;    She has some hemoptysis I do not think she has a pneumonia also negative D-dimer is reassuring that this is not PE  There may be some element of fluid overload/I will give her 1 more dose of IV Lasix  Chest x-ray finding seems to be consistent with COVID-19  On dexamethasone 6 mg IV changed over to oral dexamethasone for 7 more days        Chronic combined systolic and diastolic HF:  Troponin negative x1. Continue CHF protocol with PO Lasix. Follow-up repeat echocardiogram.  CHF coordinator consult. Continue home medications. 1 more dose of IV Lasix        TRUE: CPAP     Hypertension: Monitor BP on home medications. Anemia: Chronic  Hemoglobin stable    Mild hematuria  By time I saw patient hematuria is almost resolved  UA and cultures have been requested  Will treat for a possible UTI  Urology will follow up as an outpatient  Bactrim for 3 days T2 DM not on long-term insulin:  On oral medication        Morbid obesity:Body mass index is 77.93 kg/m². BMI Complicating assessment and treatment. Placing patient at risk for multiple co-morbidities as well as early death and contributing to the patient's presentation. Education, and counseling provided.       If patient doing well will discharge tomorrow morning to complete a 7 more days of oral steroids    Discharge Medications:   Current Discharge Medication List        START taking these medications    Details   dexamethasone (DECADRON) 6 MG tablet Take 1 tablet by mouth daily (with breakfast) for 7 days  Qty: 7 tablet, Refills: 0      guaiFENesin-dextromethorphan (ROBITUSSIN DM) 100-10 MG/5ML syrup Take 10 mLs by mouth every 4 hours as needed for Cough  Qty: 120 mL, Refills: 0      sulfamethoxazole-trimethoprim (BACTRIM DS;SEPTRA DS) 800-160 MG per tablet Take 1 tablet by mouth 2 times daily for 3 days  Qty: 6 tablet, Refills: 0           Current Discharge Medication List        Current Discharge Medication List        CONTINUE these medications which have NOT CHANGED    Details   vitamin D (ERGOCALCIFEROL) 1.25 MG (51404 UT) CAPS capsule TAKE 1 CAPSULE BY MOUTH 1 TIME A WEEK  Qty: 12 capsule, Refills: 1      atorvastatin (LIPITOR) 40 MG tablet TAKE 1 TABLET BY MOUTH DAILY  Qty: 90 tablet, Refills: 3      Multiple Vitamin (DAILY NARESH) TABS Take by mouth daily      carvedilol (COREG) 25 MG tablet TAKE 1 TABLET BY MOUTH TWICE DAILY  Qty: 180 tablet, Refills: 3      spironolactone (ALDACTONE) 25 MG tablet TAKE 1 TABLET BY MOUTH DAILY  Qty: 90 tablet, Refills: 3      aspirin 81 MG chewable tablet CHEW AND SWALLOW 1 TABLET BY MOUTH DAILY  Qty: 90 tablet, Refills: 3      ipratropium-albuterol (DUONEB) 0.5-2.5 (3) MG/3ML SOLN nebulizer solution Inhale 3 mLs into the lungs every 4 hours (while awake)  Qty: 360 mL, Refills: 1      glucose monitoring (FREESTYLE FREEDOM) kit 1 kit by Does not apply route daily  Qty: 1 kit, Refills: 0      furosemide (LASIX) 40 MG tablet TAKE 2 TABLETS BY MOUTH TWICE DAILY  Qty: 360 tablet, Refills: 3      ferrous sulfate (IRON 325) 325 (65 Fe) MG tablet Take 325 mg by mouth 2 times daily      glyBURIDE (DIABETA) 2.5 MG tablet TK 1 T PO D  Refills: 5      albuterol sulfate  (90 Base) MCG/ACT inhaler Inhale 2 puffs into the lungs      ketoconazole (NIZORAL) 2 % cream Apply topically daily as needed Apply topically daily. Current Discharge Medication List        STOP taking these medications       fluticasone (FLONASE) 50 MCG/ACT nasal spray Comments:   Reason for Stopping:         irbesartan (AVAPRO) 75 MG tablet Comments:   Reason for Stopping:               Discharge ROS:  A complete review of systems was asked and negative     Discharge Exam:    BP (!) 172/76   Pulse 78   Temp (!) 96.5 °F (35.8 °C) (Temporal)   Resp 16   Ht 5' 4\" (1.626 m)   Wt (!) 452 lb 13.2 oz (205.4 kg)   LMP  (LMP Unknown)   SpO2 95%   BMI 77.73 kg/m²   General appearance:  NAD  HEENT:   Normal cephalic, atraumatic, moist mucous membranes, no oropharyngeal erythema or exudate  Heart[de-identified] Normal s1/s2, RRR, no murmurs, gallops, or rubs.  no leg edema  Lungs:  Normal respiratory effort. Clear to auscultation, bilaterally without Rales/Wheezes/Rhonchi. Abdomen: Soft, non-tender, non-distended, bowel sounds present, no masses  Musculoskeletal:  No clubbing, no cyanosis, *  Neurologic:  Neurovascularly intact without any focal sensory/motor deficits. Cranial nerves: II-XII intact, grossly non-focal.    Labs: For convenience and continuity at follow-up the following most recent labs are provided:    Lab Results   Component Value Date/Time    WBC 5.1 12/05/2022 04:59 AM    HGB 11.0 12/05/2022 04:59 AM    HCT 36.1 12/05/2022 04:59 AM    MCV 86.2 12/05/2022 04:59 AM     12/05/2022 04:59 AM     12/05/2022 04:59 AM    K 4.9 12/05/2022 04:59 AM    K 4.6 12/02/2022 08:38 PM    CL 95 12/05/2022 04:59 AM    CO2 45 12/05/2022 04:59 AM    BUN 22 12/05/2022 04:59 AM    CREATININE 1.0 12/05/2022 04:59 AM    CALCIUM 9.7 12/05/2022 04:59 AM    PHOS 4.5 12/05/2022 04:59 AM    ALKPHOS 102 12/05/2022 04:59 AM    ALT 25 12/05/2022 04:59 AM    AST 21 12/05/2022 04:59 AM    BILITOT 0.4 12/05/2022 04:59 AM    BILIDIR <0.2 04/09/2015 08:00 AM    LABALBU 3.5 12/05/2022 04:59 AM    LDLCALC 67 01/07/2022 02:55 PM    TRIG 102 01/07/2022 02:55 PM     Lab Results   Component Value Date    INR 1.28 (H) 01/25/2018           The patient was seen and examined on day of discharge and this discharge summary is in conjunction with any daily progress note from day of discharge. Time Spent on discharge is 45 minutes  in the examination, evaluation, counseling and review of medications and discharge plan. Note that greater  than 30 minutes was spent in preparing discharge papers, discussing discharge with patient, medication review, etc.       Signed:    Asha Estrada MD   12/5/2022      Thank you Sapna Guardado for the opportunity to be involved in this patient's care.  If you have any questions or concerns please feel free to contact me

## 2022-12-05 NOTE — PROGRESS NOTES
CLINICAL PHARMACY NOTE: MEDS TO BEDS    Total # of Prescriptions Filled: 3   The following medications were delivered to the patient:  Bactrim 800-160  Dexamethasone 6 mg  Chest relief    Additional Documentation:  Delivered to Patient=Signed  Ok to be delivered per Waylon Arteaga CPhT

## 2022-12-05 NOTE — PROGRESS NOTES
[facility] [address]   Phone: [phone]    Occupational Therapy    [x] Initial Evaluation            [] Daily Treatment Note         [] Discharge Summary      Patient: Caden Brown   : 1970   MRN: [id number]   Date of Service:  2022    Admitting Diagnosis:  Acute on chronic respiratory failure with hypoxemia [facility])  Current Admission Summary: 45 y.o. F with h/o chronic HFpEF, morbid obesity, chronic hypoxic respiratory failure on 3 L NC at baseline, TRUE, asthma, HTN, and DM who presented with c/o worsening dyspnea, malaise, cough productive of yellowish sputum and blood-tinged for 2 days. She had tested positive for COVID at home 5 days ago. She was also hypoxic at home with O2 sats in the low 70s on 3 L. She denied any fevers, chest pain, worsening lower extremity edema or orthopnea. In the ED chest x-ray showed cardiomegaly and pulmonary vascular congestion with bilateral lung infiltrates concerning for edema/viral or atypical pneumonia. Past Medical History:  has a past medical history of Acute on chronic combined systolic and diastolic CHF (congestive heart failure) ([address] Asthma, Cardiomyopathy ([address] Diabetes mellitus ([address] Heart murmur, Hypertension, Obstructive sleep apnea (adult) (pediatric), Sleep apnea, and Urinary incontinence. Past Surgical History:  has no past surgical history on file. Discharge Recommendations: Caden Brown scored a 16/24 on the AM-PAC ADL Inpatient form. Current research shows that an AM-PAC score of 18 or greater is typically associated with a discharge to the patient's home setting. Based on the patient's AM-PAC score, and their current ADL deficits, it is recommended that the patient have 2-3 sessions per week of Occupational Therapy at d/c to increase the patient's independence.   At this time, this patient demonstrates the endurance and safety to discharge home with home services (home vs OP services) and a follow up treatment frequency of 2-3x/wk. Please see assessment section for further patient specific details. If patient discharges prior to next session this note will serve as a discharge summary. Please see below for the latest assessment towards goals. HOME HEALTH CARE: LEVEL 1 STANDARD    - Initial home health evaluation to occur within 24-48 hours, in patient home   - Therapy to evaluate with goal of regaining prior level of functioning   - Therapy to evaluate if patient has [address] needs for personal care      DME Required For Discharge: no DME required at discharge    Precautions/Restrictions: high fall risk    Pre-Admission Information   Lives With: son (29 y/o; works as )  Type of Home: apartment ([facility])  Home Layout: two level, 1/2 bath on main level, bedroom/bathroom upstairs, able to live on main level (staying on main floor, sleeping on couch)  Home Access:  1 step to enter without rails   Bathroom Layout: tub/shower unit, walk in shower, primarily sponge bathing in half bath  Toilet Height: standard height  Home Equipment: standard walker, single point cane, oxygen  Transfer Assistance: modified independent with use of table/pulling up on objects  Ambulation Assistance:Independent without use of device  ADL Assistance: independent with all ADL's  IADL Assistance: independent with homemaking tasks, requires assistance with meal prep  Active :        [] Yes                 [] No  Hand Dominance: [] Left                 [] Right  Current Employment: full time employment. Occupation: customer service  Hobbies: watching movies, Taoism  Recent Falls: Pt denies recent falls.     Examination   Vision:   Vision Gross Assessment: Impaired and Vision Corrective Device: wears glasses for reading  Hearing:   WFL  Sensation:   WFL  ROM:   (B) UE AROM WFL  Strength:   (B) UE strength grossly [facility]    Therapist Clinical Decision Making (Complexity): low complexity  Clinical Presentation: stable      Subjective  General: Pt lying upright in bed upon arrival, agreeable to evaluation. Denies pain  Pain: 0/10  Pain Interventions: not applicable        Activities of Daily Living  Basic Activities of Daily Living  Grooming: supervision  Grooming Comments: hand hygiene in stance at sink  Toileting: supervision. Instrumental Activities of Daily Living  No IADL completed on this date. Functional Mobility  Bed Mobility  Supine to Sit: supervision  Sit to Supine: supervision  Comments:  Transfers  Sit to stand transfer:supervision  Stand to sit transfer: supervision  Toilet transfer: supervision  Comments:  Functional Mobility:  Sitting Balance: supervision. Standing Balance: supervision. Functional Mobility: . supervision  Functional Mobility Activity: to/from bathroom  Functional Mobility Device Use: no device    Other Therapeutic Interventions    Functional Outcomes  AM-PAC Inpatient Daily Activity Raw Score: 16    Cognition  WFL  Orientation:    alert and oriented x 4  Command Following:   [facility]     Education  Barriers To Learning: none  Patient Education: patient educated on goals, OT role and benefits, plan of care, ADL adaptive strategies, energy conservation, transfer training, discharge recommendations  Learning Assessment:  patient verbalizes and demonstrates understanding, patient verbalizes understanding, would benefit from continued reinforcement    Assessment  Activity Tolerance: Tolerated fair, SpO2 reading at 85% following ambulation during both bouts. Returned to 94% within 2 minutes of rest. On 3L/min throughout  Impairments Requiring Therapeutic Intervention: decreased functional mobility, decreased ADL status, decreased endurance, decreased balance, decreased IADL  Prognosis: fair  Clinical Assessment: Pt presents below baseline d/t above deficits--typically, pt is IND for mobility and ADL, currently supervision for ambulation and fxl tasks.  Pt reports she could benefit from sock aide/reacher at home to help with LB dressing. Continued OT indicated in order to promote return to PLOF    Safety Interventions: patient left in bed, call light within reach, gait belt, nurse notified, and patient up ad darek .  No alarm engaged upon arrival    Plan  Frequency: 1-2 x/per week  Current Treatment Recommendations: strengthening, balance training, functional mobility training, transfer training, endurance training, patient/caregiver education, ADL/self-care training, IADL training, safety education, and equipment evaluation/education    Goals    Short Term Goals:  Time Frame: by discharge  Patient will complete upper body ADL at [facility]   Patient will complete lower body ADL at [facility]   Patient will complete toileting at modified independent   Patient will complete grooming at [facility]   Patient will complete functional transfers at [facility]   Patient will complete functional mobility at [facility]     Therapy Session Time     Individual Group Co-treatment   Time In    1148   Time Out    1226   Minutes    38        Timed Code Treatment Minutes:   23 minutes  Total Treatment Minutes:  38 minutes       Electronically Signed By: MAXIM Skaggs, [facility] OTR/L [id number]

## 2022-12-05 NOTE — CONSULTS
note    46 yo female with history of chronic diastolic CHF, morbid obesity, chronic hypoxic respiratory failure on 3 L NC at baseline, TRUE, asthma, HTN, and diabetes presented with complaints of worsening dyspnea, cough productive of yellowish sputum and blood-tinged and malaise for 2 days. COVID +. Urology has been consulted for gross hematuria. Currently has a purewick in place, not on anticoagulation other than Lovenox DVT prophylaxis. Urine is gwen. CT a/p 10/2021 negative from  standpoint. Disc case with nurse. Recc  Send urine to full scope, treat accordingly  Follow up has been requested for gross hematuria- may need imaging and cysto pending urine testing today. Need to follow to resolution. Can discharge from  standpoint on appropriate abx if urine cx is positive.   Will follow    Perlita Oliveros CNP  12/05/2022

## 2022-12-05 NOTE — PROGRESS NOTES
Assessment completed and documented. Lungs diminished t/o. Denies pain. Meds given. Will continue to monitor.

## 2022-12-06 LAB — URINE CULTURE, ROUTINE: NORMAL

## 2022-12-06 NOTE — PROGRESS NOTES
Physician Progress Note      PATIENT:               Joanie Dougherty  [facility] #:                  [id number]  :                       1970  ADMIT DATE:       2022 10:24 AM  [address] DATE:        2022 6:30 PM  RESPONDING  PROVIDER #:        Felicity Flynn MD          QUERY TEXT:    Patient admitted with COVID-19. Noted documentation of Pneumonia in the DC   summary on . In order to support the diagnosis of Pneumonia, please   include additional clinical indicators in your documentation. Or please   document if the diagnosis of Pneumonia has been ruled out after further study. The medical record reflects the following:  Risk Factors: 44 yo w/ dyspnea, productive cough, malaise, Positive covid   test.  Clinical Indicators: CXR : Cardiomegaly with pulmonary vascular congestion   and diffuse interstitial and  alveolar opacities. ?This could represent interstitial edema as well as an   atypical/viral infection. Per DC summary; COVID-19 pneumonia. She has some   hemoptysis I do not think she has a pneumonia. Treatment: PO Baricitinib & IV Decadron, Isolation, CXR, oxygen  Options provided:  -- COVID Pneumonia present as evidenced by, Please document evidence.   -- COVID Pneumonia was ruled out  -- Other - I will add my own diagnosis  -- Disagree - Not applicable / Not valid  -- Disagree - Clinically unable to determine / Unknown  -- Refer to Clinical Documentation Reviewer    PROVIDER RESPONSE TEXT:    Covid Pneumonia is present as evidenced by infiltrates on cxr and pt reported   covid positive test at home    Query created by: Effie Weaver on 2022 7:44 AM      Electronically signed by:  Felicity Flynn MD 2022 8:24 AM

## 2023-01-13 ENCOUNTER — TELEPHONE (OUTPATIENT)
Dept: CARDIOLOGY CLINIC | Age: 53
End: 2023-01-13

## 2023-01-13 DIAGNOSIS — E66.9 DIABETES MELLITUS TYPE 2 IN OBESE (HCC): Primary | ICD-10-CM

## 2023-01-13 DIAGNOSIS — I10 ESSENTIAL HYPERTENSION: ICD-10-CM

## 2023-01-13 DIAGNOSIS — E66.9 DIABETES MELLITUS TYPE 2 IN OBESE (HCC): ICD-10-CM

## 2023-01-13 DIAGNOSIS — R06.02 SOB (SHORTNESS OF BREATH): ICD-10-CM

## 2023-01-13 DIAGNOSIS — E11.69 DIABETES MELLITUS TYPE 2 IN OBESE (HCC): ICD-10-CM

## 2023-01-13 DIAGNOSIS — I50.42 CHRONIC COMBINED SYSTOLIC (CONGESTIVE) AND DIASTOLIC (CONGESTIVE) HEART FAILURE (HCC): ICD-10-CM

## 2023-01-13 DIAGNOSIS — E78.2 MIXED HYPERLIPIDEMIA: ICD-10-CM

## 2023-01-13 DIAGNOSIS — E11.69 DIABETES MELLITUS TYPE 2 IN OBESE (HCC): Primary | ICD-10-CM

## 2023-01-13 LAB
A/G RATIO: 1.7 (ref 1.1–2.2)
ALBUMIN SERPL-MCNC: 4 G/DL (ref 3.4–5)
ALP BLD-CCNC: 134 U/L (ref 40–129)
ALT SERPL-CCNC: 29 U/L (ref 10–40)
ANION GAP SERPL CALCULATED.3IONS-SCNC: 9 MMOL/L (ref 3–16)
AST SERPL-CCNC: 24 U/L (ref 15–37)
BILIRUB SERPL-MCNC: 0.3 MG/DL (ref 0–1)
BUN BLDV-MCNC: 17 MG/DL (ref 7–20)
CALCIUM SERPL-MCNC: 10 MG/DL (ref 8.3–10.6)
CHLORIDE BLD-SCNC: 94 MMOL/L (ref 99–110)
CHOLESTEROL, TOTAL: 139 MG/DL (ref 0–199)
CO2: 35 MMOL/L (ref 21–32)
CREAT SERPL-MCNC: 0.9 MG/DL (ref 0.6–1.1)
GFR SERPL CREATININE-BSD FRML MDRD: >60 ML/MIN/{1.73_M2}
GLUCOSE BLD-MCNC: 190 MG/DL (ref 70–99)
HCT VFR BLD CALC: 36.4 % (ref 36–48)
HDLC SERPL-MCNC: 49 MG/DL (ref 40–60)
HEMOGLOBIN: 10.9 G/DL (ref 12–16)
LDL CHOLESTEROL CALCULATED: 71 MG/DL
MCH RBC QN AUTO: 27 PG (ref 26–34)
MCHC RBC AUTO-ENTMCNC: 29.9 G/DL (ref 31–36)
MCV RBC AUTO: 90.1 FL (ref 80–100)
PDW BLD-RTO: 16.8 % (ref 12.4–15.4)
PLATELET # BLD: 243 K/UL (ref 135–450)
PMV BLD AUTO: 9.7 FL (ref 5–10.5)
POTASSIUM SERPL-SCNC: 4.6 MMOL/L (ref 3.5–5.1)
PRO-BNP: 220 PG/ML (ref 0–124)
RBC # BLD: 4.04 M/UL (ref 4–5.2)
SODIUM BLD-SCNC: 138 MMOL/L (ref 136–145)
TOTAL PROTEIN: 6.3 G/DL (ref 6.4–8.2)
TRIGL SERPL-MCNC: 94 MG/DL (ref 0–150)
VLDLC SERPL CALC-MCNC: 19 MG/DL
WBC # BLD: 6.1 K/UL (ref 4–11)

## 2023-01-13 NOTE — TELEPHONE ENCOUNTER
Guerline from the lab called the office.  Pt had her Dr. Vilchis labs drawn today and wanted to know if an A1C can be added to the lab orders?     We can call Guerline back in the lab at [phone] if OK.

## 2023-01-14 LAB
ESTIMATED AVERAGE GLUCOSE: 211.6 MG/DL
HBA1C MFR BLD: 9 %

## 2023-01-24 DIAGNOSIS — I42.9 CARDIOMYOPATHY, UNSPECIFIED TYPE (HCC): Primary | ICD-10-CM

## 2023-01-24 DIAGNOSIS — I50.42 CHRONIC COMBINED SYSTOLIC AND DIASTOLIC CHF (CONGESTIVE HEART FAILURE) (HCC): ICD-10-CM

## 2023-01-24 DIAGNOSIS — I50.21 ACUTE SYSTOLIC HEART FAILURE (HCC): ICD-10-CM

## 2023-01-24 RX ORDER — CARVEDILOL 25 MG/1
TABLET ORAL
Qty: 180 TABLET | Refills: 3 | Status: SHIPPED | OUTPATIENT
Start: 2023-01-24

## 2023-01-24 RX ORDER — CARVEDILOL 25 MG/1
TABLET ORAL
Qty: 180 TABLET | Refills: 2 | Status: SHIPPED | OUTPATIENT
Start: 2023-01-24

## 2023-03-28 RX ORDER — FUROSEMIDE 40 MG/1
TABLET ORAL
Qty: 360 TABLET | Refills: 3 | Status: SHIPPED | OUTPATIENT
Start: 2023-03-28

## 2023-03-28 NOTE — TELEPHONE ENCOUNTER
Prescription refill    Last OV:10/03/2022    Last Refill:02/25/2022    Labs:01/13/2023    Future Appt: 04/03/2023

## 2023-04-04 RX ORDER — METOPROLOL SUCCINATE 25 MG/1
25 TABLET, EXTENDED RELEASE ORAL DAILY
Qty: 90 TABLET | Refills: 1 | Status: SHIPPED | OUTPATIENT
Start: 2023-04-04

## 2023-04-08 ENCOUNTER — HOSPITAL ENCOUNTER (EMERGENCY)
Age: 53
Discharge: HOME | End: 2023-04-08
Payer: COMMERCIAL

## 2023-04-08 ENCOUNTER — APPOINTMENT (OUTPATIENT)
Dept: GENERAL RADIOLOGY | Age: 53
End: 2023-04-08
Payer: COMMERCIAL

## 2023-04-08 VITALS
OXYGEN SATURATION: 95 % | HEART RATE: 107 BPM | WEIGHT: 293 LBS | RESPIRATION RATE: 28 BRPM | DIASTOLIC BLOOD PRESSURE: 72 MMHG | SYSTOLIC BLOOD PRESSURE: 147 MMHG | BODY MASS INDEX: 50.02 KG/M2 | TEMPERATURE: 99 F | HEIGHT: 64 IN

## 2023-04-08 DIAGNOSIS — R42 LIGHTHEADEDNESS: Primary | ICD-10-CM

## 2023-04-08 LAB
ALBUMIN SERPL-MCNC: 4.1 G/DL (ref 3.4–5)
ALBUMIN/GLOB SERPL: 1.2 {RATIO} (ref 1.1–2.2)
ALP SERPL-CCNC: 161 U/L (ref 40–129)
ALT SERPL-CCNC: 24 U/L (ref 10–40)
ANION GAP SERPL CALCULATED.3IONS-SCNC: 6 MMOL/L (ref 3–16)
AST SERPL-CCNC: 24 U/L (ref 15–37)
BASOPHILS # BLD: 0 K/UL (ref 0–0.2)
BASOPHILS NFR BLD: 0.4 %
BILIRUB SERPL-MCNC: <0.2 MG/DL (ref 0–1)
BUN SERPL-MCNC: 17 MG/DL (ref 7–20)
CALCIUM SERPL-MCNC: 10 MG/DL (ref 8.3–10.6)
CHLORIDE SERPL-SCNC: 95 MMOL/L (ref 99–110)
CO2 SERPL-SCNC: 41 MMOL/L (ref 21–32)
CREAT SERPL-MCNC: 1.2 MG/DL (ref 0.6–1.1)
DEPRECATED RDW RBC AUTO: 14.6 % (ref 12.4–15.4)
EOSINOPHIL # BLD: 0.1 K/UL (ref 0–0.6)
EOSINOPHIL NFR BLD: 2.2 %
GFR SERPLBLD CREATININE-BSD FMLA CKD-EPI: 54 ML/MIN/{1.73_M2}
GLUCOSE SERPL-MCNC: 177 MG/DL (ref 70–99)
HCG SERPL QL: NEGATIVE
HCT VFR BLD AUTO: 37.4 % (ref 36–48)
HGB BLD-MCNC: 11.5 G/DL (ref 12–16)
LYMPHOCYTES # BLD: 1.6 K/UL (ref 1–5.1)
LYMPHOCYTES NFR BLD: 23.6 %
MCH RBC QN AUTO: 27.4 PG (ref 26–34)
MCHC RBC AUTO-ENTMCNC: 30.6 G/DL (ref 31–36)
MCV RBC AUTO: 89.4 FL (ref 80–100)
MONOCYTES # BLD: 0.4 K/UL (ref 0–1.3)
MONOCYTES NFR BLD: 6 %
NEUTROPHILS # BLD: 4.4 K/UL (ref 1.7–7.7)
NEUTROPHILS NFR BLD: 67.8 %
PLATELET # BLD AUTO: 218 K/UL (ref 135–450)
PMV BLD AUTO: 8.6 FL (ref 5–10.5)
POTASSIUM SERPL-SCNC: 4.4 MMOL/L (ref 3.5–5.1)
PROT SERPL-MCNC: 7.6 G/DL (ref 6.4–8.2)
RBC # BLD AUTO: 4.19 M/UL (ref 4–5.2)
SODIUM SERPL-SCNC: 142 MMOL/L (ref 136–145)
TROPONIN T SERPL-MCNC: <0.01 NG/ML
WBC # BLD AUTO: 6.6 K/UL (ref 4–11)

## 2023-04-08 PROCEDURE — 93005 ELECTROCARDIOGRAM TRACING: CPT | Performed by: EMERGENCY MEDICINE

## 2023-04-08 PROCEDURE — 80053 COMPREHEN METABOLIC PANEL: CPT

## 2023-04-08 PROCEDURE — 85025 COMPLETE CBC W/AUTO DIFF WBC: CPT

## 2023-04-08 PROCEDURE — 84703 CHORIONIC GONADOTROPIN ASSAY: CPT

## 2023-04-08 PROCEDURE — 71045 X-RAY EXAM CHEST 1 VIEW: CPT

## 2023-04-08 PROCEDURE — 84484 ASSAY OF TROPONIN QUANT: CPT

## 2023-04-08 RX ORDER — 0.9 % SODIUM CHLORIDE 0.9 %
500 INTRAVENOUS SOLUTION INTRAVENOUS ONCE
Status: DISCONTINUED | OUTPATIENT
Start: 2023-04-08 | End: 2023-04-08

## 2023-04-08 ASSESSMENT — ENCOUNTER SYMPTOMS
ABDOMINAL PAIN: 0
SHORTNESS OF BREATH: 0
WHEEZING: 0
VOMITING: 0
RHINORRHEA: 0
NAUSEA: 0
DIARRHEA: 0
COUGH: 0

## 2023-04-08 ASSESSMENT — PAIN DESCRIPTION - PAIN TYPE: TYPE: ACUTE PAIN

## 2023-04-08 ASSESSMENT — PAIN - FUNCTIONAL ASSESSMENT
PAIN_FUNCTIONAL_ASSESSMENT: 0-10
PAIN_FUNCTIONAL_ASSESSMENT: PREVENTS OR INTERFERES SOME ACTIVE ACTIVITIES AND ADLS

## 2023-04-08 ASSESSMENT — PAIN DESCRIPTION - ORIENTATION: ORIENTATION: RIGHT

## 2023-04-08 ASSESSMENT — PAIN DESCRIPTION - LOCATION: LOCATION: LEG

## 2023-04-08 ASSESSMENT — PAIN SCALES - GENERAL: PAINLEVEL_OUTOF10: 7

## 2023-04-08 ASSESSMENT — PAIN DESCRIPTION - ONSET: ONSET: ON-GOING

## 2023-04-08 ASSESSMENT — PAIN DESCRIPTION - DESCRIPTORS: DESCRIPTORS: ACHING

## 2023-04-08 ASSESSMENT — PAIN DESCRIPTION - FREQUENCY: FREQUENCY: CONTINUOUS

## 2023-04-09 LAB
EKG ATRIAL RATE: 102 BPM
EKG DIAGNOSIS: NORMAL
EKG P AXIS: 46 DEGREES
EKG P-R INTERVAL: 160 MS
EKG Q-T INTERVAL: 362 MS
EKG QRS DURATION: 102 MS
EKG QTC CALCULATION (BAZETT): 471 MS
EKG R AXIS: 46 DEGREES
EKG T AXIS: 27 DEGREES
EKG VENTRICULAR RATE: 102 BPM

## 2023-04-09 NOTE — DISCHARGE INSTRUCTIONS
Take your blood pressure daily. Keep a journal with any episodes of lightheadedness.   Follow-up with your primary care doctor or cardiologist.

## 2023-04-09 NOTE — ED PROVIDER NOTES
Source) n/a    CC/HPI Summary, DDx, ED Course, and Reassessment: Patient presents for evaluation of episodes of weakness and lightheadedness. On exam, she is morbidly obese sitting in wheelchair no acute distress and nontoxic. Hypertensive but vitals otherwise stable and she is afebrile. She is alert and oriented x3. GCS 15. Cranial nerves II through XII are intact. Normal coordination. Pupils are equal round and reactive to light. Lungs are clear to auscultation bilaterally. Abdomen is soft, nontender. Orthostatic vitals performed and nursing staff. Please see attending note for EKG interpretation    Disposition Considerations (tests considered but not done, Admit vs D/C, Shared Decision Making, Pt Expectation of Test or Tx.): CBC and CMP are unremarkable. Creat 1.2 with a BUN of 17. Troponin negative. Beta-hCG is negative. Chest x-ray shows borderline cardiomegaly and possible mild pulmonary edema. Orthostatics were negative. Discussed gentle fluid resuscitation due to mild DAVION, however, given the fact that she has mild pulmonary edema I do not believe that this is indicated. She was encouraged to increase oral hydration status. Patient states that she believes symptoms are related to her new blood pressure medication. She decided that she was not going to take this and she was encouraged to check her blood pressure daily and follow-up closely either with PCP or cardiology. I completed a structured, evidence-based clinical evaluation to screen for acute stroke and neurologic deficits in this patient. The patient has a normal detailed neurologic exam, which is highly sensitive for dangerous causes of dizziness, vertigo, or loss of balance. At this time there is no indication of head injury, encephalopathy, multiple sclerosis, TIA/CVA, seizures, dehydration, hypoglycemia, mass lesions, metabolic cause, cardiac arrhythmia, PE, GI bleeding or orthostatic hypotension.     I have discussed with the

## 2023-04-09 NOTE — FLOWSHEET NOTE
04/08/23 2156 04/08/23 2158   Vital Signs   Heart Rate 96 94   Resp 25 15   BP (!) 144/61  (sitting) (!) 148/95  (standing)   MAP (Calculated) 89 113   MAP (mmHg) 85 109     Orthostatics preformed by RN. Patient denies any symptoms, denies dizziness, CP, or SOB.

## 2023-04-14 LAB
ALBUMIN SERPL-MCNC: 4.5 G/DL
ALP BLD-CCNC: 130 U/L
ALT SERPL-CCNC: 25 U/L
ANION GAP SERPL CALCULATED.3IONS-SCNC: 9 MMOL/L
AST SERPL-CCNC: 23 U/L
AVERAGE GLUCOSE: 206
BILIRUB SERPL-MCNC: 0.3 MG/DL (ref 0.1–1.4)
BUN BLDV-MCNC: NORMAL MG/DL
CALCIUM SERPL-MCNC: 10.1 MG/DL
CHLORIDE BLD-SCNC: 97 MMOL/L
CO2: 35 MMOL/L
CREAT SERPL-MCNC: 1.08 MG/DL
EGFR: 61
GLUCOSE BLD-MCNC: 179 MG/DL
HBA1C MFR BLD: 8.8 %
POTASSIUM SERPL-SCNC: 4.6 MMOL/L
SODIUM BLD-SCNC: 141 MMOL/L
TOTAL PROTEIN: 6.7

## 2023-05-11 DIAGNOSIS — I50.42 CHRONIC COMBINED SYSTOLIC AND DIASTOLIC CHF (CONGESTIVE HEART FAILURE) (HCC): Chronic | ICD-10-CM

## 2023-05-11 DIAGNOSIS — R06.02 SOB (SHORTNESS OF BREATH): ICD-10-CM

## 2023-05-11 DIAGNOSIS — E55.9 VITAMIN D DEFICIENCY: Primary | ICD-10-CM

## 2023-05-11 DIAGNOSIS — I10 ESSENTIAL HYPERTENSION: Chronic | ICD-10-CM

## 2023-05-11 DIAGNOSIS — E78.2 MIXED HYPERLIPIDEMIA: ICD-10-CM

## 2023-05-15 RX ORDER — ERGOCALCIFEROL 1.25 MG/1
CAPSULE ORAL
Qty: 12 CAPSULE | Refills: 0 | Status: SHIPPED | OUTPATIENT
Start: 2023-05-15

## 2023-06-21 RX ORDER — ASPIRIN 81 MG
TABLET,CHEWABLE ORAL
Qty: 90 TABLET | Refills: 3 | Status: SHIPPED | OUTPATIENT
Start: 2023-06-21

## 2023-06-21 RX ORDER — SPIRONOLACTONE 25 MG/1
25 TABLET ORAL DAILY
Qty: 90 TABLET | Refills: 3 | Status: SHIPPED | OUTPATIENT
Start: 2023-06-21

## 2023-08-07 ENCOUNTER — OFFICE VISIT (OUTPATIENT)
Dept: CARDIOLOGY CLINIC | Age: 53
End: 2023-08-07

## 2023-08-07 VITALS
WEIGHT: 293 LBS | SYSTOLIC BLOOD PRESSURE: 132 MMHG | OXYGEN SATURATION: 97 % | BODY MASS INDEX: 50.02 KG/M2 | HEIGHT: 64 IN | DIASTOLIC BLOOD PRESSURE: 76 MMHG | HEART RATE: 79 BPM

## 2023-08-07 DIAGNOSIS — I10 ESSENTIAL HYPERTENSION: Chronic | ICD-10-CM

## 2023-08-07 DIAGNOSIS — G47.33 OSA (OBSTRUCTIVE SLEEP APNEA): ICD-10-CM

## 2023-08-07 DIAGNOSIS — R06.02 SOB (SHORTNESS OF BREATH): ICD-10-CM

## 2023-08-07 DIAGNOSIS — E78.2 MIXED HYPERLIPIDEMIA: ICD-10-CM

## 2023-08-07 DIAGNOSIS — D50.9 IRON DEFICIENCY ANEMIA, UNSPECIFIED IRON DEFICIENCY ANEMIA TYPE: ICD-10-CM

## 2023-08-07 DIAGNOSIS — E55.9 VITAMIN D DEFICIENCY: ICD-10-CM

## 2023-08-07 DIAGNOSIS — I50.42 CHRONIC COMBINED SYSTOLIC AND DIASTOLIC CHF (CONGESTIVE HEART FAILURE) (HCC): Primary | ICD-10-CM

## 2023-08-07 DIAGNOSIS — I50.42 CHRONIC COMBINED SYSTOLIC AND DIASTOLIC CHF (CONGESTIVE HEART FAILURE) (HCC): ICD-10-CM

## 2023-08-07 DIAGNOSIS — I10 ESSENTIAL HYPERTENSION: ICD-10-CM

## 2023-08-07 LAB
25(OH)D3 SERPL-MCNC: 41 NG/ML
DEPRECATED RDW RBC AUTO: 15.4 % (ref 12.4–15.4)
FERRITIN SERPL IA-MCNC: 437.3 NG/ML (ref 15–150)
HCT VFR BLD AUTO: 38.7 % (ref 36–48)
HGB BLD-MCNC: 12 G/DL (ref 12–16)
MCH RBC QN AUTO: 27.4 PG (ref 26–34)
MCHC RBC AUTO-ENTMCNC: 31 G/DL (ref 31–36)
MCV RBC AUTO: 88.3 FL (ref 80–100)
NT-PROBNP SERPL-MCNC: 98 PG/ML (ref 0–124)
PLATELET # BLD AUTO: 224 K/UL (ref 135–450)
PMV BLD AUTO: 9.8 FL (ref 5–10.5)
RBC # BLD AUTO: 4.38 M/UL (ref 4–5.2)
WBC # BLD AUTO: 5.9 K/UL (ref 4–11)

## 2023-08-07 RX ORDER — ATORVASTATIN CALCIUM 40 MG/1
40 TABLET, FILM COATED ORAL DAILY
Qty: 90 TABLET | Refills: 3 | Status: SHIPPED | OUTPATIENT
Start: 2023-08-07

## 2023-08-07 NOTE — PATIENT INSTRUCTIONS
Plan:  1. Work letter printed. 2.    Get labs today. 3.    See Dr. Daylin Vogt in 6 months   4. Continue same medication.

## 2023-08-08 LAB
ALBUMIN SERPL-MCNC: 4.2 G/DL (ref 3.4–5)
ALBUMIN/GLOB SERPL: 1.6 {RATIO} (ref 1.1–2.2)
ALP SERPL-CCNC: 165 U/L (ref 40–129)
ALT SERPL-CCNC: 27 U/L (ref 10–40)
ANION GAP SERPL CALCULATED.3IONS-SCNC: 15 MMOL/L (ref 3–16)
AST SERPL-CCNC: 24 U/L (ref 15–37)
BILIRUB SERPL-MCNC: 0.4 MG/DL (ref 0–1)
BUN SERPL-MCNC: 13 MG/DL (ref 7–20)
CALCIUM SERPL-MCNC: 10 MG/DL (ref 8.3–10.6)
CHLORIDE SERPL-SCNC: 94 MMOL/L (ref 99–110)
CHOLEST SERPL-MCNC: 149 MG/DL (ref 0–199)
CO2 SERPL-SCNC: 32 MMOL/L (ref 21–32)
CREAT SERPL-MCNC: 1 MG/DL (ref 0.6–1.1)
GFR SERPLBLD CREATININE-BSD FMLA CKD-EPI: >60 ML/MIN/{1.73_M2}
GLUCOSE SERPL-MCNC: 174 MG/DL (ref 70–99)
HDLC SERPL-MCNC: 52 MG/DL (ref 40–60)
IRON SATN MFR SERPL: 28 % (ref 15–50)
IRON SERPL-MCNC: 82 UG/DL (ref 37–145)
LDLC SERPL CALC-MCNC: 80 MG/DL
POTASSIUM SERPL-SCNC: 4.3 MMOL/L (ref 3.5–5.1)
PROT SERPL-MCNC: 6.9 G/DL (ref 6.4–8.2)
SODIUM SERPL-SCNC: 141 MMOL/L (ref 136–145)
TIBC SERPL-MCNC: 289 UG/DL (ref 260–445)
TRIGL SERPL-MCNC: 87 MG/DL (ref 0–150)
VLDLC SERPL CALC-MCNC: 17 MG/DL

## 2023-09-24 RX ORDER — ERGOCALCIFEROL 1.25 MG/1
CAPSULE ORAL
Qty: 12 CAPSULE | Refills: 0 | Status: SHIPPED | OUTPATIENT
Start: 2023-09-24

## 2023-11-13 NOTE — PROGRESS NOTES
Hospitalist Progress Note      PCP: Richard Newman    Date of Admission: 12/2/2022    Chief Complaint: Shortness of breath    Hospital Course:  Deborah Dunlap is a 45 y.o. F with h/o chronic HFpEF, morbid obesity, chronic hypoxic respiratory failure on 3 L NC at baseline, TRUE, asthma, HTN, and DM who presented with c/o worsening dyspnea, malaise, cough productive of yellowish sputum and blood-tinged for 2 days. She had tested positive for COVID at home 5 days ago. She was also hypoxic at home with O2 sats in the low 70s on 3 L. She denied any fevers, chest pain, worsening lower extremity edema or orthopnea. In the ED chest x-ray showed cardiomegaly and pulmonary vascular congestion with bilateral lung infiltrates concerning for edema/viral or atypical pneumonia. Subjective: Patient seen and examined. Dyspnea and cough stable. Remains on 5 L NC. No fever or chills.       Medications:  Reviewed    Infusion Medications    sodium chloride      dextrose       Scheduled Medications    dexamethasone  8 mg IntraVENous Q24H    furosemide  40 mg Oral BID    albuterol sulfate HFA  2 puff Inhalation Q4H WA    ipratropium  2 puff Inhalation Q4H WA    [START ON 12/4/2022] carvedilol  6.25 mg Oral BID    iron sucrose (VENOFER) iv piggyback 100 mL (Admin over 60 minutes)  200 mg IntraVENous Q24H    insulin glargine  20 Units SubCUTAneous Nightly    sodium chloride flush  5-40 mL IntraVENous 2 times per day    enoxaparin  60 mg SubCUTAneous BID    aspirin  81 mg Oral Daily    atorvastatin  40 mg Oral Daily    fluticasone  2 spray Each Nostril Daily    spironolactone  25 mg Oral Daily    insulin lispro  0-8 Units SubCUTAneous TID     insulin lispro  0-4 Units SubCUTAneous Nightly    baricitinib  4 mg Oral Q24H     PRN Meds: sodium chloride flush, sodium chloride, ondansetron **OR** ondansetron, polyethylene glycol, acetaminophen **OR** acetaminophen, acetaminophen **OR** acetaminophen, guaiFENesin-dextromethorphan, perflutren lipid microspheres, iopamidol, ipratropium-albuterol, dextrose bolus **OR** dextrose bolus, glucagon (rDNA), dextrose, mineral oil-hydrophilic petrolatum      Intake/Output Summary (Last 24 hours) at 12/3/2022 1231  Last data filed at 12/3/2022 3444  Gross per 24 hour   Intake 10 ml   Output 1300 ml   Net -1290 ml       Physical Exam Performed:    /75   Pulse 77   Temp 98.1 °F (36.7 °C)   Resp 18   Ht 5' 4\" (1.626 m)   Wt (!) 444 lb 7.2 oz (201.6 kg)   LMP  (LMP Unknown)   SpO2 96%   BMI 76.29 kg/m²     General appearance: No apparent distress, appears stated age and cooperative. HEENT: Pupils equal, round, and reactive to light. Conjunctivae/corneas clear. Neck: Supple, with full range of motion. No jugular venous distention. Trachea midline. Respiratory:  Normal respiratory effort. Decreased AE bilaterally. Cardiovascular: Regular rate and rhythm with normal S1/S2 without murmurs, rubs or gallops. Abdomen: Soft, non-tender, non-distended with normal bowel sounds. Musculoskeletal: No clubbing, cyanosis or edema bilaterally. Full range of motion without deformity. Skin: Skin color, texture, turgor normal.  No rashes or lesions. Neurologic:  Neurovascularly intact without any focal sensory/motor deficits.  Cranial nerves: II-XII intact, grossly non-focal.  Psychiatric: Alert and oriented, thought content appropriate, normal insight  Capillary Refill: Brisk, 3 seconds, normal   Peripheral Pulses: +2 palpable, equal bilaterally       Labs:   Recent Labs     12/02/22  1132 12/03/22  0545   WBC 4.3 3.0*   HGB 11.1* 10.4*   HCT 36.3 34.9*    188     Recent Labs     12/02/22  1132 12/02/22 2038 12/03/22  0545     --  140   K 4.5 4.6 4.6   CL 97*  --  97*   CO2 36*  --  35*   BUN 9  --  16   CREATININE 0.8  --  0.9   CALCIUM 9.6  --  9.6   PHOS  --   --  3.7     Recent Labs     12/02/22  1132 12/03/22  0545   AST 22 20   ALT 21 19   BILITOT 0.6 0.3 ALKPHOS 121 107     No results for input(s): INR in the last 72 hours. Recent Labs     12/02/22  1132   TROPONINI <0.01       Urinalysis:      Lab Results   Component Value Date/Time    NITRU Negative 04/09/2015 07:30 AM    BLOODU Negative 04/09/2015 07:30 AM    SPECGRAV 1.020 04/09/2015 07:30 AM    GLUCOSEU Negative 04/09/2015 07:30 AM       Radiology:  XR CHEST PORTABLE   Final Result   Cardiomegaly with pulmonary vascular congestion and diffuse interstitial and   alveolar opacities. This could represent interstitial edema as well as an   atypical/viral infection. IP CONSULT TO HEART FAILURE NURSE/COORDINATOR    Assessment/Plan:    Active Hospital Problems    Diagnosis     Acute on chronic respiratory failure with hypoxemia (HCC) [J96.21]      Priority: Medium       Acute on chronic hypoxic respiratory failure due to COVID Pneumonia:  CXR with bilateral infiltrates. .3. Started on Baricitinib & IV Decadron. Respiratory viral panel positive for COVID 19. Procalcitonin 0.06; hold off antibiotics for now. Continue bronchodilators. D-dimer 0.60; continue prophylactic Lovenox. Chronic combined systolic and diastolic HF:  Troponin negative x1. Continue CHF protocol with PO Lasix. Follow-up repeat echocardiogram.  CHF coordinator consult. Continue home medications. TRUE: CPAP     Hypertension: Monitor BP on home medications. Anemia:  Work up c/w AoCD/NIMCO. Started on supplementation. Goal Hgb > 7 g/dl. T2 DM not on long-term insulin:  Hold oral medications. Follow-up A1c. Monitor BG on basal insulin and SSI        Morbid obesity:Body mass index is 77.93 kg/m². BMI Complicating assessment and treatment. Placing patient at risk for multiple co-morbidities as well as early death and contributing to the patient's presentation. Education, and counseling provided. DVT Prophylaxis: Lovenox  Diet: ADULT DIET; Regular; 4 carb choices (60 gm/meal);  Low Fat/Low Chol/High Fiber/2 gm Na  Code Status: Full Code  PT/OT Eval Status: independent    Dispo - Home once medically stable.       Sandy Joy MD left upper arm

## 2023-12-15 ENCOUNTER — TELEPHONE (OUTPATIENT)
Dept: CARDIOLOGY CLINIC | Age: 53
End: 2023-12-15

## 2023-12-15 RX ORDER — ERGOCALCIFEROL 1.25 MG/1
50000 CAPSULE ORAL WEEKLY
Qty: 12 CAPSULE | Refills: 1 | Status: SHIPPED | OUTPATIENT
Start: 2023-12-15

## 2023-12-15 NOTE — TELEPHONE ENCOUNTER
Last ov:23 ANDREW  Next ov:  Last EK23  Last labs:23  Last filled:   Disp Refills Start End    vitamin D (ERGOCALCIFEROL) 1.25 MG (61155 UT) CAPS capsule 12 capsule 0 2023     Sig: TAKE 1 CAPSULE BY MOUTH 1 TIME A WEEK    Sent to pharmacy as: Vitamin D (Ergocalciferol) 1.25 MG (40432 UT) Oral Capsule (ERGOCALCIFEROL)    E-Prescribing Status: Receipt confirmed by pharmacy (2023  4:05 PM EDT)

## 2023-12-18 NOTE — TELEPHONE ENCOUNTER
Last OV: 08/07/2023  Mame  Last Labs: EKG-04/08/2023   Next OV: 02/09/2024  Mame  Last Refill: Metoprolol 25mg    04/04/2023  Mame

## 2023-12-19 RX ORDER — ERGOCALCIFEROL 1.25 MG/1
CAPSULE ORAL
Qty: 12 CAPSULE | Refills: 1 | OUTPATIENT
Start: 2023-12-19

## 2023-12-19 NOTE — TELEPHONE ENCOUNTER
Labs of 8/7/23 showed Vit D Hydroxy at 41.0    RX APPROVAL:  Last OV  8/7/23 ; plan, and labs reviewed

## 2023-12-20 RX ORDER — METOPROLOL SUCCINATE 25 MG/1
25 TABLET, EXTENDED RELEASE ORAL DAILY
Qty: 90 TABLET | Refills: 3 | OUTPATIENT
Start: 2023-12-20

## 2024-01-10 ENCOUNTER — COMMUNITY OUTREACH (OUTPATIENT)
Dept: PRIMARY CARE CLINIC | Age: 54
End: 2024-01-10

## 2024-02-07 NOTE — PROGRESS NOTES
(TOPROL XL) 25 MG extended release tablet TAKE 1 TABLET BY MOUTH DAILY 90 tablet 0    vitamin D (ERGOCALCIFEROL) 1.25 MG (41203 UT) CAPS capsule Take 1 capsule by mouth Once a week at 5 PM 12 capsule 1    OXYGEN Inhale 3 L into the lungs continuous      atorvastatin (LIPITOR) 40 MG tablet Take 1 tablet by mouth daily 90 tablet 3    spironolactone (ALDACTONE) 25 MG tablet TAKE 1 TABLET BY MOUTH DAILY 90 tablet 3    ASPIRIN LOW DOSE 81 MG chewable tablet CHEW AND SWALLOW 1 TABLET BY MOUTH DAILY 90 tablet 3    furosemide (LASIX) 40 MG tablet TAKE 2 TABLETS BY MOUTH TWICE DAILY (Patient taking differently: Take 2 tablets by mouth 2 times daily) 360 tablet 3    glyBURIDE (DIABETA) 2.5 MG tablet TK 1 T PO D  5    glucose monitoring (FREESTYLE FREEDOM) kit 1 kit by Does not apply route daily 1 kit 0    ketoconazole (NIZORAL) 2 % cream Apply topically daily as needed Apply topically daily.        No current facility-administered medications for this visit.       Past Medical History:   Diagnosis Date    Acute on chronic combined systolic and diastolic CHF (congestive heart failure) (HCC) 1/26/2018    Asthma     Cardiomyopathy (HCC)     Diabetes mellitus (HCC)     Heart murmur     Hypertension     Obstructive sleep apnea (adult) (pediatric) 1/26/2018    Sleep apnea     Urinary incontinence      History reviewed. No pertinent surgical history.  Family History   Problem Relation Age of Onset    High Blood Pressure Mother     Other Mother         cardiac murmur    Arthritis Mother     Hypertension Mother     High Blood Pressure Father     Hypertension Father     Stroke Father     Arthritis Father     High Blood Pressure Sister     Diabetes Sister         prediabetic    Hypertension Sister     Hypertension Maternal Uncle     Cancer Maternal Uncle     Cancer Maternal Grandmother     Breast Cancer Maternal Aunt      Social History     Socioeconomic History    Marital status: Single     Spouse name: Not on file    Number of

## 2024-02-09 ENCOUNTER — OFFICE VISIT (OUTPATIENT)
Dept: CARDIOLOGY CLINIC | Age: 54
End: 2024-02-09

## 2024-02-09 VITALS
OXYGEN SATURATION: 95 % | HEART RATE: 84 BPM | BODY MASS INDEX: 50.02 KG/M2 | HEIGHT: 64 IN | DIASTOLIC BLOOD PRESSURE: 68 MMHG | SYSTOLIC BLOOD PRESSURE: 120 MMHG | WEIGHT: 293 LBS

## 2024-02-09 DIAGNOSIS — G47.33 OSA (OBSTRUCTIVE SLEEP APNEA): ICD-10-CM

## 2024-02-09 DIAGNOSIS — I10 ESSENTIAL HYPERTENSION: ICD-10-CM

## 2024-02-09 DIAGNOSIS — I50.42 CHRONIC COMBINED SYSTOLIC AND DIASTOLIC CHF (CONGESTIVE HEART FAILURE) (HCC): ICD-10-CM

## 2024-02-09 DIAGNOSIS — E78.2 MIXED HYPERLIPIDEMIA: ICD-10-CM

## 2024-02-09 DIAGNOSIS — I50.42 CHRONIC COMBINED SYSTOLIC AND DIASTOLIC CHF (CONGESTIVE HEART FAILURE) (HCC): Primary | ICD-10-CM

## 2024-02-09 DIAGNOSIS — R06.02 SOB (SHORTNESS OF BREATH): ICD-10-CM

## 2024-02-09 LAB
DEPRECATED RDW RBC AUTO: 16.1 % (ref 12.4–15.4)
HCT VFR BLD AUTO: 39 % (ref 36–48)
HGB BLD-MCNC: 11.7 G/DL (ref 12–16)
MCH RBC QN AUTO: 26.7 PG (ref 26–34)
MCHC RBC AUTO-ENTMCNC: 30.1 G/DL (ref 31–36)
MCV RBC AUTO: 89 FL (ref 80–100)
PLATELET # BLD AUTO: 239 K/UL (ref 135–450)
PMV BLD AUTO: 9.5 FL (ref 5–10.5)
RBC # BLD AUTO: 4.38 M/UL (ref 4–5.2)
WBC # BLD AUTO: 5.8 K/UL (ref 4–11)

## 2024-02-09 RX ORDER — EMPAGLIFLOZIN 10 MG/1
10 TABLET, FILM COATED ORAL EVERY MORNING
COMMUNITY

## 2024-02-09 NOTE — PATIENT INSTRUCTIONS
Plan:  1.   Get labs soon (BMP,BNP,CBC)  2. Schedule with Dr. Amador  See Dr. Vilchis in  4 months

## 2024-02-10 LAB
ANION GAP SERPL CALCULATED.3IONS-SCNC: 14 MMOL/L (ref 3–16)
BUN SERPL-MCNC: 16 MG/DL (ref 7–20)
CALCIUM SERPL-MCNC: 9.4 MG/DL (ref 8.3–10.6)
CHLORIDE SERPL-SCNC: 93 MMOL/L (ref 99–110)
CO2 SERPL-SCNC: 35 MMOL/L (ref 21–32)
CREAT SERPL-MCNC: 0.9 MG/DL (ref 0.6–1.1)
GFR SERPLBLD CREATININE-BSD FMLA CKD-EPI: >60 ML/MIN/{1.73_M2}
GLUCOSE SERPL-MCNC: 156 MG/DL (ref 70–99)
NT-PROBNP SERPL-MCNC: 104 PG/ML (ref 0–124)
POTASSIUM SERPL-SCNC: 4.3 MMOL/L (ref 3.5–5.1)
SODIUM SERPL-SCNC: 142 MMOL/L (ref 136–145)

## 2024-02-22 RX ORDER — ATORVASTATIN CALCIUM 40 MG/1
40 TABLET, FILM COATED ORAL DAILY
Qty: 90 TABLET | Refills: 3 | Status: SHIPPED | OUTPATIENT
Start: 2024-02-22

## 2024-02-26 RX ORDER — ATORVASTATIN CALCIUM 40 MG/1
40 TABLET, FILM COATED ORAL DAILY
Qty: 90 TABLET | Refills: 3 | OUTPATIENT
Start: 2024-02-26

## 2024-03-29 RX ORDER — METOPROLOL SUCCINATE 25 MG/1
25 TABLET, EXTENDED RELEASE ORAL DAILY
Qty: 90 TABLET | Refills: 3 | Status: SHIPPED | OUTPATIENT
Start: 2024-03-29

## 2024-04-01 RX ORDER — ERGOCALCIFEROL 1.25 MG/1
50000 CAPSULE ORAL WEEKLY
Qty: 12 CAPSULE | Refills: 1 | Status: SHIPPED | OUTPATIENT
Start: 2024-04-01 | End: 2024-04-01

## 2024-04-01 RX ORDER — MULTIVIT-MIN/IRON/FOLIC ACID/K 18-600-40
1 CAPSULE ORAL DAILY
COMMUNITY

## 2024-04-01 RX ORDER — METOPROLOL SUCCINATE 25 MG/1
25 TABLET, EXTENDED RELEASE ORAL DAILY
Qty: 90 TABLET | Refills: 3 | Status: SHIPPED | OUTPATIENT
Start: 2024-04-01

## 2024-04-01 NOTE — TELEPHONE ENCOUNTER
Vitamin D refused because patient's vit d labs have been therapeutic that last two lab draws.    Metoprolol approved and sent in    RX APPROVAL:  Last OV  2/9/24          ; plan, and labs reviewed

## 2024-06-11 NOTE — PROGRESS NOTES
[facility]  Advanced CHF/Pulmonary Hypertension   Cardiac Evaluation      Malena Woodson  YOB: 1970    Date of Visit:  6/12/24    Chief Complaint   Patient presents with    Congestive Heart Failure    Dizziness    Edema    Shortness of Breath      History of Present Illness:  Malena Woodson is a 53 y.o. female with a past medical history of HTN, DM, TRUE on Bipap, asthma, and morbid obesity.      On 5/4/2022, she presented to the hospital with shortness of breath, cough, and sore throat. In the ED she was found to be hypoxic with sats in the 80s. CTPA was negative for PE. She was admitted with asthma exacerbation, started on solu medrol and nebs. She was transitioned to prednisone but was still requiring O2 at time of discharge on 5/17/22.     She was admitted Dec 2022 with worsening SOB, diagnosed with Covid. In the ED chest x-ray showed cardiomegaly and pulmonary vascular congestion with bilateral lung infiltrates concerning for edema/viral or atypical pneumonia. IV steroids. Discharged in 3 days.     She is using her Bipap every night.      She was last seen in office 2/9/24 with complaints of shortness of breath and dizziness. She had a lot of pain in her knees. She stated she will need FMLA paperwork filled out soon. She no longer sees pulmonary, she is looking around for one. She uses BIPAP, prescribed from . She might be moving to [place], [place] within the next month. Discussed her needing a referral for pulmonary and sleep study. She was down 20lbs. She takes two tablets of Lasix twice a day. She uses a co-pay card for Jardiance. Denies chest pain,  syncope, orthopnea, or palpitations.        Today, she is here for follow up for diastolic heart failure. She has lost 35 pounds and she is limiting her portions size.  She has cut out chocolate.  She is working on cutting out Dr. Pepper.  She states she has no PCP.  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  Her last A1c

## 2024-06-12 ENCOUNTER — OFFICE VISIT (OUTPATIENT)
Dept: CARDIOLOGY CLINIC | Age: 54
End: 2024-06-12

## 2024-06-12 VITALS
HEART RATE: 104 BPM | OXYGEN SATURATION: 89 % | BODY MASS INDEX: 50.02 KG/M2 | HEIGHT: 64 IN | SYSTOLIC BLOOD PRESSURE: 102 MMHG | WEIGHT: 293 LBS | DIASTOLIC BLOOD PRESSURE: 66 MMHG

## 2024-06-12 DIAGNOSIS — E11.9 TYPE 2 DIABETES MELLITUS WITHOUT COMPLICATION, WITHOUT LONG-TERM CURRENT USE OF INSULIN (HCC): ICD-10-CM

## 2024-06-12 DIAGNOSIS — I10 ESSENTIAL HYPERTENSION: ICD-10-CM

## 2024-06-12 DIAGNOSIS — I50.42 CHRONIC COMBINED SYSTOLIC AND DIASTOLIC CHF (CONGESTIVE HEART FAILURE) (HCC): Primary | ICD-10-CM

## 2024-06-12 DIAGNOSIS — R06.02 SOB (SHORTNESS OF BREATH): ICD-10-CM

## 2024-06-12 DIAGNOSIS — E78.2 MIXED HYPERLIPIDEMIA: ICD-10-CM

## 2024-06-12 DIAGNOSIS — G47.33 OSA (OBSTRUCTIVE SLEEP APNEA): ICD-10-CM

## 2024-06-12 RX ORDER — ASPIRIN 81 MG/1
TABLET, CHEWABLE ORAL
Qty: 90 TABLET | Refills: 3 | Status: SHIPPED | OUTPATIENT
Start: 2024-06-12

## 2024-06-12 RX ORDER — EMPAGLIFLOZIN 10 MG/1
10 TABLET, FILM COATED ORAL EVERY MORNING
Qty: 90 TABLET | Refills: 3 | Status: SHIPPED | OUTPATIENT
Start: 2024-06-12

## 2024-06-12 RX ORDER — SPIRONOLACTONE 25 MG/1
25 TABLET ORAL DAILY
Qty: 90 TABLET | Refills: 3 | Status: SHIPPED | OUTPATIENT
Start: 2024-06-12

## 2024-06-12 RX ORDER — FUROSEMIDE 40 MG/1
80 TABLET ORAL 2 TIMES DAILY
Qty: 120 TABLET | Refills: 5 | Status: SHIPPED | OUTPATIENT
Start: 2024-06-12

## 2024-06-12 RX ORDER — ALBUTEROL SULFATE 90 UG/1
2 AEROSOL, METERED RESPIRATORY (INHALATION) 4 TIMES DAILY PRN
Qty: 18 G | Refills: 1 | Status: SHIPPED | OUTPATIENT
Start: 2024-06-12

## 2024-06-12 NOTE — PATIENT INSTRUCTIONS
Plan:  1.   Labs soon FASTING.  CBC, BNP, CMP, Lipids.   2.   Refill for Jardiance, spironolactone, Lasix, aspirin. Albuterol PRN to pharmacy.   3.   Referral to pulmonology for SOB and TRUE.   4.   Fax [facility] to [phone] if you would like to.   5.   Referral to Endocrinology.   6.   Referral to PCP:  List given to patient.   7.   See Dr. Vilchis in 6 months

## 2024-06-18 ENCOUNTER — TELEPHONE (OUTPATIENT)
Dept: CARDIOLOGY CLINIC | Age: 54
End: 2024-06-18

## 2024-06-18 NOTE — TELEPHONE ENCOUNTER
Left pt a message asking if she is taking Irbesartan 75mg daily or not?     Requested call back.

## 2024-07-02 ENCOUNTER — TELEPHONE (OUTPATIENT)
Dept: CARDIOLOGY CLINIC | Age: 54
End: 2024-07-02

## 2024-08-19 DIAGNOSIS — I50.42 CHRONIC COMBINED SYSTOLIC AND DIASTOLIC CHF (CONGESTIVE HEART FAILURE) (HCC): ICD-10-CM

## 2024-08-19 DIAGNOSIS — R06.02 SOB (SHORTNESS OF BREATH): ICD-10-CM

## 2024-08-19 DIAGNOSIS — E78.2 MIXED HYPERLIPIDEMIA: ICD-10-CM

## 2024-08-19 DIAGNOSIS — I10 ESSENTIAL HYPERTENSION: ICD-10-CM

## 2024-08-19 LAB
ALBUMIN SERPL-MCNC: 4.2 G/DL (ref 3.4–5)
ALBUMIN/GLOB SERPL: 1.6 {RATIO} (ref 1.1–2.2)
ALP SERPL-CCNC: 168 U/L (ref 40–129)
ALT SERPL-CCNC: 28 U/L (ref 10–40)
ANION GAP SERPL CALCULATED.3IONS-SCNC: 12 MMOL/L (ref 3–16)
AST SERPL-CCNC: 24 U/L (ref 15–37)
BILIRUB SERPL-MCNC: 0.3 MG/DL (ref 0–1)
BUN SERPL-MCNC: 16 MG/DL (ref 7–20)
CALCIUM SERPL-MCNC: 9.5 MG/DL (ref 8.3–10.6)
CHLORIDE SERPL-SCNC: 93 MMOL/L (ref 99–110)
CHOLEST SERPL-MCNC: 157 MG/DL (ref 0–199)
CO2 SERPL-SCNC: 35 MMOL/L (ref 21–32)
CREAT SERPL-MCNC: 1.1 MG/DL (ref 0.6–1.1)
DEPRECATED RDW RBC AUTO: 17.9 % (ref 12.4–15.4)
GFR SERPLBLD CREATININE-BSD FMLA CKD-EPI: 59 ML/MIN/{1.73_M2}
GLUCOSE SERPL-MCNC: 173 MG/DL (ref 70–99)
HCT VFR BLD AUTO: 42.2 % (ref 36–48)
HDLC SERPL-MCNC: 49 MG/DL (ref 40–60)
HGB BLD-MCNC: 12.6 G/DL (ref 12–16)
LDLC SERPL CALC-MCNC: 87 MG/DL
MCH RBC QN AUTO: 26.1 PG (ref 26–34)
MCHC RBC AUTO-ENTMCNC: 29.9 G/DL (ref 31–36)
MCV RBC AUTO: 87.2 FL (ref 80–100)
NT-PROBNP SERPL-MCNC: 115 PG/ML (ref 0–124)
PLATELET # BLD AUTO: 233 K/UL (ref 135–450)
PMV BLD AUTO: 9.5 FL (ref 5–10.5)
POTASSIUM SERPL-SCNC: 4 MMOL/L (ref 3.5–5.1)
PROT SERPL-MCNC: 6.9 G/DL (ref 6.4–8.2)
RBC # BLD AUTO: 4.84 M/UL (ref 4–5.2)
SODIUM SERPL-SCNC: 140 MMOL/L (ref 136–145)
TRIGL SERPL-MCNC: 106 MG/DL (ref 0–150)
VLDLC SERPL CALC-MCNC: 21 MG/DL
WBC # BLD AUTO: 6.7 K/UL (ref 4–11)

## 2024-10-03 ENCOUNTER — OFFICE VISIT (OUTPATIENT)
Dept: ENDOCRINOLOGY | Age: 54
End: 2024-10-03
Payer: COMMERCIAL

## 2024-10-03 VITALS
HEIGHT: 64 IN | DIASTOLIC BLOOD PRESSURE: 75 MMHG | WEIGHT: 293 LBS | HEART RATE: 101 BPM | SYSTOLIC BLOOD PRESSURE: 140 MMHG | BODY MASS INDEX: 50.02 KG/M2

## 2024-10-03 DIAGNOSIS — E11.69 TYPE 2 DIABETES MELLITUS WITH OBESITY (HCC): Primary | ICD-10-CM

## 2024-10-03 DIAGNOSIS — E66.01 MORBID OBESITY WITH BMI OF 70 AND OVER, ADULT: Chronic | ICD-10-CM

## 2024-10-03 DIAGNOSIS — E78.2 MIXED HYPERLIPIDEMIA: ICD-10-CM

## 2024-10-03 DIAGNOSIS — B37.9 YEAST INFECTION: ICD-10-CM

## 2024-10-03 DIAGNOSIS — E66.9 TYPE 2 DIABETES MELLITUS WITH OBESITY (HCC): Primary | ICD-10-CM

## 2024-10-03 LAB — HBA1C MFR BLD: 8.2 %

## 2024-10-03 PROCEDURE — G2211 COMPLEX E/M VISIT ADD ON: HCPCS | Performed by: INTERNAL MEDICINE

## 2024-10-03 PROCEDURE — 99204 OFFICE O/P NEW MOD 45 MIN: CPT | Performed by: INTERNAL MEDICINE

## 2024-10-03 PROCEDURE — 3052F HG A1C>EQUAL 8.0%<EQUAL 9.0%: CPT | Performed by: INTERNAL MEDICINE

## 2024-10-03 PROCEDURE — 83036 HEMOGLOBIN GLYCOSYLATED A1C: CPT | Performed by: INTERNAL MEDICINE

## 2024-10-03 RX ORDER — GLYBURIDE 5 MG/1
5 TABLET ORAL DAILY
Qty: 90 TABLET | Refills: 1 | Status: SHIPPED | OUTPATIENT
Start: 2024-10-03

## 2024-10-03 RX ORDER — BLOOD-GLUCOSE METER
1 KIT MISCELLANEOUS ONCE
Qty: 1 KIT | Refills: 0 | Status: SHIPPED | OUTPATIENT
Start: 2024-10-03 | End: 2024-10-03

## 2024-10-03 RX ORDER — SEMAGLUTIDE 0.68 MG/ML
INJECTION, SOLUTION SUBCUTANEOUS
Qty: 9 ML | Refills: 1 | Status: SHIPPED | OUTPATIENT
Start: 2024-10-03

## 2024-10-03 RX ORDER — FLUCONAZOLE 150 MG/1
150 TABLET ORAL ONCE
Qty: 1 TABLET | Refills: 0 | Status: SHIPPED | OUTPATIENT
Start: 2024-10-03 | End: 2024-10-03

## 2024-10-03 RX ORDER — LANCETS 30 GAUGE
1 EACH MISCELLANEOUS DAILY
Qty: 100 EACH | Refills: 5 | Status: SHIPPED | OUTPATIENT
Start: 2024-10-03

## 2024-10-03 RX ORDER — GLUCOSAMINE HCL/CHONDROITIN SU 500-400 MG
CAPSULE ORAL
Qty: 100 STRIP | Refills: 3 | Status: SHIPPED | OUTPATIENT
Start: 2024-10-03

## 2024-10-03 NOTE — PATIENT INSTRUCTIONS
diet, and exercise affect your blood sugar levels.  Since low blood sugar levels can quickly become an emergency, be sure to wear medical alert jewelry, such as a medical alert bracelet. This is to let people know you have diabetes so they can get help for you. You can buy this at most drugstores. And make sure your family, friends, and coworkers know the symptoms of low blood sugar. Teach them what to do to get your sugar level up.  Follow-up care is a key part of your treatment and safety. Be sure to make and go to all appointments, and call your doctor if you are having problems. It's also a good idea to know your test results and keep a list of the medicines you take.  Where can you learn more?  Go to https://www.[employer].net/patientEd and enter T511 to learn more about \"Learning About Low Blood Sugar (Hypoglycemia) in Diabetes.\"  Current as of: October 2, 2023  Content Version: 14.2  © 2024 [employer].   Care instructions adapted under license by [employer]. If you have questions about a medical condition or this instruction, always ask your healthcare professional. [employer] disclaims any warranty or liability for your use of this information.       A1c 8.2%

## 2024-10-03 NOTE — PROGRESS NOTES
[facility]    Chief Complaint:     Chief Complaint   Patient presents with    New Patient    Diabetes        Subjective:   Malena Woodson is a pleasant 53 y.o. female who presents for an initial evaluation of Diabetes Mellitus type 2. Patient also has DM, hypertension, TRUE, chronic CHF, and has a BMI of 71.    Diagnosed: around 2018.   Initial medications: metformin (ill feeling)   On insulin since: never   Hx of severe hypoglycemia or DKA:  Hx of MI/stroke/CKD: none, CHF on 3 L.   Hx of pancreatitis: none   Family hx of thyroid cancer: none     Most recent HbA1c:   Hemoglobin A1C   Date Value Ref Range Status   10/03/2024 8.2 % Final      Current regimen:  Glyburide 5 mg daily   Jardiance 10 mg daily    Blood sugar ranges: recently moved, unsure where meter is. Not checking.   Hypoglycemic episodes: none     Meals: no cooking, prefers fast food.   B-Mac sausage sandwich, biscuits  L-might skip   D-Catfish sandwich with fries   Orders doordash or oatmeal.   Loves popsicles, Dr. Pepper.     Had seen a nutritionist in the past.      Exercise: limited, does chair exercises.   Last eye exam: Due Oct-Nov, early cataracts.   No PCP currently.     Foot care: no cuts or ulcers.   Reports itching in the vulvar area since starting Jardiance.  No UTI or genital infections.    Hyperlipidemia: On atorvastatin 40 mg daily.  Lipid panel from August 2024 showed an LDL of 87, triglycerides 106.  No smoking, alcohol or illicit drug use.  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    The following portions of the patient's history were reviewed and updated as appropriate:       Current Outpatient Medications:     glucose monitoring kit, 1 each by Does not apply route once for 1 dose, Disp: 1 kit, Rfl: 0    Lancets MISC, 1 each by Does not apply route daily Check 3 times per week., Disp: 100 each, Rfl: 5    blood glucose monitor strips, Check 3 times per week., Disp: 100

## 2024-10-17 NOTE — PROGRESS NOTES
[facility]  Advanced CHF/Pulmonary Hypertension   Cardiac Evaluation      Malena Woodson  YOB: 1970    Date of Visit:  6/10/24    No chief complaint on file.     History of Present Illness:  Malena Woodson is a 53 y.o. female with a past medical history of HTN, DM, TRUE on Bipap, asthma, and morbid obesity.      On 5/4/2022, she presented to the hospital with shortness of breath, cough, and sore throat. In the ED she was found to be hypoxic with sats in the 80s. CTPA was negative for PE. She was admitted with asthma exacerbation, started on solu medrol and nebs. She was transitioned to prednisone but was still requiring O2 at time of discharge on 5/17/22.     She was admitted Dec 2022 with worsening SOB, diagnosed with Covid. In the ED chest x-ray showed cardiomegaly and pulmonary vascular congestion with bilateral lung infiltrates concerning for edema/viral or atypical pneumonia. IV steroids. Discharged in 3 days.     She is using her Bipap every night.      She was last seen in office 2/9/24 with complaints of shortness of breath and dizziness. She had a lot of pain in her knees. She stated she will need FMLA paperwork filled out soon. She no longer sees pulmonary, she is looking around for one. She uses BIPAP, prescribed from . She might be moving to [place], [place] within the next month. Discussed her needing a referral for pulmonary and sleep study. She was down 20lbs. She takes two tablets of Lasix twice a day. She uses a co-pay card for Jardiance. Denies chest pain,  syncope, orthopnea, or palpitations.        Today, she is here for follow up for diastolic heart failure.       NYHA Class 3      Allergies   Allergen Reactions    Levofloxacin Other (See Comments)     headache     Current Outpatient Medications   Medication Sig Dispense Refill    metoprolol succinate (TOPROL XL) 25 MG extended release tablet Take 1 tablet by mouth daily 90 tablet 3    Cholecalciferol (VITAMIN D)  157.48

## 2024-11-08 ENCOUNTER — PATIENT MESSAGE (OUTPATIENT)
Dept: ENDOCRINOLOGY | Age: 54
End: 2024-11-08

## 2024-11-08 DIAGNOSIS — E11.69 TYPE 2 DIABETES MELLITUS WITH OBESITY (HCC): Primary | ICD-10-CM

## 2024-11-08 DIAGNOSIS — E66.9 TYPE 2 DIABETES MELLITUS WITH OBESITY (HCC): Primary | ICD-10-CM

## 2024-11-08 RX ORDER — ORAL SEMAGLUTIDE 3 MG/1
3 TABLET ORAL
Qty: 30 TABLET | Refills: 2 | Status: SHIPPED | OUTPATIENT
Start: 2024-11-08

## 2024-12-04 NOTE — PROGRESS NOTES
[facility]  Advanced CHF/Pulmonary Hypertension   Cardiac Evaluation      Malena Woodson  YOB: 1970    Date of Visit:  12/18/24    Chief Complaint   Patient presents with    6 Month Follow-Up    Congestive Heart Failure      History of Present Illness:  Malena Woodson is a 54 y.o. female with a past medical history of HTN, DM, TRUE on Bipap, asthma, and morbid obesity.      On 5/4/2022, she presented to the hospital with shortness of breath, cough, and sore throat. In the ED she was found to be hypoxic with sats in the 80s. CTPA was negative for PE. She was admitted with asthma exacerbation, started on solu medrol and nebs. She was transitioned to prednisone but was still requiring O2 at time of discharge on 5/17/22.     She was admitted Dec 2022 with worsening SOB, diagnosed with Covid. In the ED chest x-ray showed cardiomegaly and pulmonary vascular congestion with bilateral lung infiltrates concerning for edema/viral or atypical pneumonia. IV steroids. Discharged in 3 days.     She is NOT using her Bipap every night and sees Dr. Yang. But she is wearing oxygen  Endocrinology: Joselyn Delgado MD    Pt was last seen in office 6/12/24 and presents today for a follow up of chronic diastolic heart failure. Last EF 50-55% on 12/5/22. Will start Rybelsus due to side effects from Ozempic that she did not like. She does not think she can give herself a shot and she likes the control of taking a pill instead. Stopped taking Jardiance because of risk of yeast infections. She has lost 47 pounds since August/2023. Her knees are bothering her which prevents her from exercising like she wants to.   No cardiac complaints this visit. Denies SOB. Denies chest pain or palpitations. No edema noted.       NYHA Class 3    Allergies   Allergen Reactions    Levofloxacin Other (See Comments)     headache     Current Outpatient Medications   Medication Sig Dispense Refill    glucose monitoring kit 1 each by

## 2024-12-18 ENCOUNTER — OFFICE VISIT (OUTPATIENT)
Dept: CARDIOLOGY CLINIC | Age: 54
End: 2024-12-18
Payer: COMMERCIAL

## 2024-12-18 ENCOUNTER — HOSPITAL ENCOUNTER (OUTPATIENT)
Age: 54
Discharge: HOME | End: 2024-12-18
Payer: COMMERCIAL

## 2024-12-18 VITALS
BODY MASS INDEX: 50.02 KG/M2 | SYSTOLIC BLOOD PRESSURE: 138 MMHG | WEIGHT: 293 LBS | HEART RATE: 82 BPM | HEIGHT: 64 IN | OXYGEN SATURATION: 98 % | DIASTOLIC BLOOD PRESSURE: 70 MMHG

## 2024-12-18 DIAGNOSIS — I50.42 CHRONIC COMBINED SYSTOLIC AND DIASTOLIC CHF (CONGESTIVE HEART FAILURE) (HCC): Primary | ICD-10-CM

## 2024-12-18 DIAGNOSIS — E11.9 TYPE 2 DIABETES MELLITUS WITHOUT COMPLICATION, WITHOUT LONG-TERM CURRENT USE OF INSULIN (HCC): ICD-10-CM

## 2024-12-18 DIAGNOSIS — R06.02 SOB (SHORTNESS OF BREATH): ICD-10-CM

## 2024-12-18 DIAGNOSIS — I10 ESSENTIAL HYPERTENSION: ICD-10-CM

## 2024-12-18 DIAGNOSIS — I50.42 CHRONIC COMBINED SYSTOLIC AND DIASTOLIC CHF (CONGESTIVE HEART FAILURE) (HCC): ICD-10-CM

## 2024-12-18 DIAGNOSIS — E78.2 MIXED HYPERLIPIDEMIA: ICD-10-CM

## 2024-12-18 DIAGNOSIS — G47.33 OSA (OBSTRUCTIVE SLEEP APNEA): ICD-10-CM

## 2024-12-18 LAB
ALBUMIN SERPL-MCNC: 4.1 G/DL (ref 3.4–5)
ALBUMIN/GLOB SERPL: 1.3 {RATIO} (ref 1.1–2.2)
ALP SERPL-CCNC: 156 U/L (ref 40–129)
ALT SERPL-CCNC: 23 U/L (ref 10–40)
ANION GAP SERPL CALCULATED.3IONS-SCNC: 11 MMOL/L (ref 3–16)
AST SERPL-CCNC: 24 U/L (ref 15–37)
BILIRUB SERPL-MCNC: 0.4 MG/DL (ref 0–1)
BUN SERPL-MCNC: 16 MG/DL (ref 7–20)
CALCIUM SERPL-MCNC: 10.3 MG/DL (ref 8.3–10.6)
CHLORIDE SERPL-SCNC: 93 MMOL/L (ref 99–110)
CHOLEST SERPL-MCNC: 139 MG/DL (ref 0–199)
CO2 SERPL-SCNC: 34 MMOL/L (ref 21–32)
CREAT SERPL-MCNC: 0.9 MG/DL (ref 0.6–1.1)
DEPRECATED RDW RBC AUTO: 16.4 % (ref 12.4–15.4)
GFR SERPLBLD CREATININE-BSD FMLA CKD-EPI: 75 ML/MIN/{1.73_M2}
GLUCOSE SERPL-MCNC: 170 MG/DL (ref 70–99)
HCT VFR BLD AUTO: 38 % (ref 36–48)
HDLC SERPL-MCNC: 41 MG/DL (ref 40–60)
HGB BLD-MCNC: 11.7 G/DL (ref 12–16)
LDL CHOLESTEROL: 80 MG/DL
MCH RBC QN AUTO: 27 PG (ref 26–34)
MCHC RBC AUTO-ENTMCNC: 30.8 G/DL (ref 31–36)
MCV RBC AUTO: 87.4 FL (ref 80–100)
NT-PROBNP SERPL-MCNC: 103 PG/ML (ref 0–124)
PLATELET # BLD AUTO: 216 K/UL (ref 135–450)
PMV BLD AUTO: 10.1 FL (ref 5–10.5)
POTASSIUM SERPL-SCNC: 4.2 MMOL/L (ref 3.5–5.1)
PROT SERPL-MCNC: 7.2 G/DL (ref 6.4–8.2)
RBC # BLD AUTO: 4.34 M/UL (ref 4–5.2)
SODIUM SERPL-SCNC: 138 MMOL/L (ref 136–145)
TRIGL SERPL-MCNC: 90 MG/DL (ref 0–150)
VLDLC SERPL CALC-MCNC: 18 MG/DL
WBC # BLD AUTO: 6.7 K/UL (ref 4–11)

## 2024-12-18 PROCEDURE — 99215 OFFICE O/P EST HI 40 MIN: CPT | Performed by: INTERNAL MEDICINE

## 2024-12-18 PROCEDURE — 83880 ASSAY OF NATRIURETIC PEPTIDE: CPT

## 2024-12-18 PROCEDURE — 85027 COMPLETE CBC AUTOMATED: CPT

## 2024-12-18 PROCEDURE — 36415 COLL VENOUS BLD VENIPUNCTURE: CPT

## 2024-12-18 PROCEDURE — 3075F SYST BP GE 130 - 139MM HG: CPT | Performed by: INTERNAL MEDICINE

## 2024-12-18 PROCEDURE — 80061 LIPID PANEL: CPT

## 2024-12-18 PROCEDURE — 80053 COMPREHEN METABOLIC PANEL: CPT

## 2024-12-18 PROCEDURE — 3078F DIAST BP <80 MM HG: CPT | Performed by: INTERNAL MEDICINE

## 2024-12-18 NOTE — PATIENT INSTRUCTIONS
Continue current medications, no changes  Fasting labs now: CBC, CMP, BNP, and LIPID  Follow up with Nargis Vilchis MD in 6 months

## 2025-02-20 ENCOUNTER — TELEMEDICINE (OUTPATIENT)
Dept: ENDOCRINOLOGY | Age: 55
End: 2025-02-20

## 2025-02-20 DIAGNOSIS — E78.2 MIXED HYPERLIPIDEMIA: Primary | ICD-10-CM

## 2025-02-20 DIAGNOSIS — E66.9 TYPE 2 DIABETES MELLITUS WITH OBESITY (HCC): ICD-10-CM

## 2025-02-20 DIAGNOSIS — E11.69 TYPE 2 DIABETES MELLITUS WITH OBESITY (HCC): ICD-10-CM

## 2025-02-20 DIAGNOSIS — E66.01 MORBID OBESITY WITH BMI OF 70 AND OVER, ADULT: ICD-10-CM

## 2025-02-20 PROBLEM — E11.9 DIABETES MELLITUS (HCC): Chronic | Status: RESOLVED | Noted: 2018-05-22 | Resolved: 2025-02-20

## 2025-02-20 PROBLEM — B37.9 YEAST INFECTION: Status: RESOLVED | Noted: 2024-10-03 | Resolved: 2025-02-20

## 2025-02-20 RX ORDER — GLUCOSAMINE HCL/CHONDROITIN SU 500-400 MG
CAPSULE ORAL
Qty: 100 STRIP | Refills: 3 | Status: SHIPPED | OUTPATIENT
Start: 2025-02-20

## 2025-02-20 RX ORDER — DAPAGLIFLOZIN 10 MG/1
10 TABLET, FILM COATED ORAL EVERY MORNING
Qty: 90 TABLET | Refills: 1 | Status: SHIPPED | OUTPATIENT
Start: 2025-02-20

## 2025-02-20 RX ORDER — AVOBENZONE, HOMOSALATE, OCTISALATE, OCTOCRYLENE 30; 40; 45; 26 MG/ML; MG/ML; MG/ML; MG/ML
1 CREAM TOPICAL 2 TIMES DAILY
Qty: 100 EACH | Refills: 3 | Status: SHIPPED | OUTPATIENT
Start: 2025-02-20

## 2025-02-20 RX ORDER — GLYBURIDE 5 MG/1
5 TABLET ORAL 2 TIMES DAILY WITH MEALS
Qty: 180 TABLET | Refills: 1 | Status: SHIPPED | OUTPATIENT
Start: 2025-02-20

## 2025-02-20 NOTE — PROGRESS NOTES
with obesity (HCC)  Assessment & Plan  1. Type 2 diabetes mellitus:   - The target A1c for this patient is <7%, given the age and PMH, provided this can be achieved with no significant hypoglycemia.   - Reviewed recent labs, blood sugars and A1c, current diabetes regimen, food intake and meal times. I also reviewed all available self-testing BG results.      Uncontrolled. A1c 8.2% in October 2024. Blood glucose readings 180-260.  - Increase glyburide to 5 mg twice daily before meals  - Start Farxiga 10 mg daily  - Monitor for symptoms of urinary or yeast infections and contact the clinic if such symptoms occur  - Defer further trials of GLP-1 agonist due to intolerance to Rybelsus and preference to avoid injections      2. Mixed hyperlipidemia  LDL at target, continue statin therapy.  Update lipid panel annually.    3. CHF, following with cardiology.         EDUCATION:   Greater than 50% of this visit was spent in general counseling regarding obesity, diet, exercise, importance of adherence to regimen, recognition and treatment of hypo and hyperglycemia,  glucose logging, proper diabetes management, diabetic complications with poor management and the importance of glycemic control in order to avoid the complications of diabetes.    Risks and potential complications of diabetes were reviewed with the patient.    Return in about 3 months (around 5/20/2025) for Diabetes.    Joselyn Delgado MD   1:56 PM  2/20/2025    Malena Woodson, was evaluated through a synchronous (real-time) audio-video encounter. The patient (or guardian if applicable) is aware that this is a billable service, which includes applicable co-pays. This Virtual Visit was conducted with patient's (and/or legal guardian's) consent. Patient identification was verified, and a caregiver was present when appropriate.   The patient was located at Home: [address] [place] [id number]  Provider was located at Facility (Appt Dept): [facility]

## 2025-02-27 ENCOUNTER — TELEPHONE (OUTPATIENT)
Dept: CARDIOLOGY CLINIC | Age: 55
End: 2025-02-27

## 2025-02-27 NOTE — TELEPHONE ENCOUNTER
Pt is having trouble with her bp. Currently it is 117/48. She says that a couple days ago her body went limp but she was still conscious. Feeling foggy headed and like she is going to pass out.    Please call to advise. [phone]

## 2025-02-27 NOTE — TELEPHONE ENCOUNTER
She feels like she is going to pass out every once in a awhile for the last year.   She feels it every day now, for one week.   8 days ago, she states \"my legs gave out and I sank to the floor\".      She has taken all medications as prescribed. However,she states that when she cannot remember if she took a med, she just won't take it and will start taking it again the next day.      No symptoms right now. No SOB. No dizziness right now. This morning, she felt dizzy and took her BP at home. It was 117/48.      Are you drinking enough water? She states she has a 33 ounce cup and she has 2-3 cups a day. I explained that was 99 ounces and way over what she should be drinking. She stated verbal understanding and will try to keep total liquid intake to 48-64oz a day.

## 2025-02-27 NOTE — TELEPHONE ENCOUNTER
Please see if she would like to come in tomorrow and see Lea.  Might need a monitor, needs orthostatics, etc.  ANDREW

## 2025-05-16 DIAGNOSIS — I50.42 CHRONIC COMBINED SYSTOLIC AND DIASTOLIC CHF (CONGESTIVE HEART FAILURE) (HCC): ICD-10-CM

## 2025-05-16 DIAGNOSIS — R06.02 SOB (SHORTNESS OF BREATH): ICD-10-CM

## 2025-05-16 NOTE — TELEPHONE ENCOUNTER
Received refill request for   furosemide (LASIX) 40 MG   from [facility]  pharmacy.     Last OV:12/18/2024 ANDREW      Next OV: 6/20/2025 ANDREW    Last Labs: 12/18/2024 CMP

## 2025-05-20 ENCOUNTER — PATIENT MESSAGE (OUTPATIENT)
Dept: CARDIOLOGY CLINIC | Age: 55
End: 2025-05-20

## 2025-05-20 DIAGNOSIS — E78.2 MIXED HYPERLIPIDEMIA: ICD-10-CM

## 2025-05-20 DIAGNOSIS — R06.02 SOB (SHORTNESS OF BREATH): ICD-10-CM

## 2025-05-20 DIAGNOSIS — I50.42 CHRONIC COMBINED SYSTOLIC AND DIASTOLIC CHF (CONGESTIVE HEART FAILURE) (HCC): ICD-10-CM

## 2025-05-20 RX ORDER — FUROSEMIDE 40 MG/1
80 TABLET ORAL 2 TIMES DAILY
Qty: 120 TABLET | Refills: 5 | Status: SHIPPED | OUTPATIENT
Start: 2025-05-20 | End: 2025-05-20 | Stop reason: SDUPTHER

## 2025-05-20 NOTE — TELEPHONE ENCOUNTER
Prescription refill:  Requested Prescriptions     Pending Prescriptions Disp Refills    furosemide (LASIX) 40 MG tablet [Pharmacy Med Name: FUROSEMIDE 40MG TABLETS] 120 tablet 5     Sig: TAKE 2 TABLETS BY MOUTH TWICE DAILY       Refill parameters per protocol were met    Last OV: 12/18/2024     Future Appt: 6/20/2025     LABS: 12/18/24

## 2025-05-21 RX ORDER — ASPIRIN 81 MG/1
TABLET, CHEWABLE ORAL
Qty: 90 TABLET | Refills: 3 | Status: SHIPPED | OUTPATIENT
Start: 2025-05-21

## 2025-05-21 RX ORDER — FUROSEMIDE 40 MG/1
80 TABLET ORAL 2 TIMES DAILY
Qty: 360 TABLET | Refills: 3 | Status: SHIPPED | OUTPATIENT
Start: 2025-05-21

## 2025-05-21 NOTE — TELEPHONE ENCOUNTER
Prescription refill:  Requested Prescriptions     Pending Prescriptions Disp Refills    aspirin (ASPIRIN LOW DOSE) 81 MG chewable tablet 90 tablet 3     Sig: CHEW AND SWALLOW 1 TABLET BY MOUTH DAILY    furosemide (LASIX) 40 MG tablet 360 tablet 3     Sig: Take 2 tablets by mouth 2 times daily       Refill parameters per protocol were met    Last OV: 12/18/2024     Future Appt: 6/20/2025     LABS: 12/18/24

## 2025-05-22 ENCOUNTER — TELEMEDICINE (OUTPATIENT)
Dept: ENDOCRINOLOGY | Age: 55
End: 2025-05-22
Payer: COMMERCIAL

## 2025-05-22 DIAGNOSIS — E78.2 MIXED HYPERLIPIDEMIA: ICD-10-CM

## 2025-05-22 DIAGNOSIS — E11.69 TYPE 2 DIABETES MELLITUS WITH OBESITY (HCC): Primary | ICD-10-CM

## 2025-05-22 DIAGNOSIS — E66.9 TYPE 2 DIABETES MELLITUS WITH OBESITY (HCC): Primary | ICD-10-CM

## 2025-05-22 PROCEDURE — 99214 OFFICE O/P EST MOD 30 MIN: CPT | Performed by: INTERNAL MEDICINE

## 2025-05-22 RX ORDER — GLYBURIDE 5 MG/1
5 TABLET ORAL 2 TIMES DAILY WITH MEALS
Qty: 180 TABLET | Refills: 1 | Status: SHIPPED | OUTPATIENT
Start: 2025-05-22

## 2025-05-22 RX ORDER — KETOROLAC TROMETHAMINE 30 MG/ML
INJECTION, SOLUTION INTRAMUSCULAR; INTRAVENOUS
Qty: 1 EACH | Refills: 1 | Status: SHIPPED | OUTPATIENT
Start: 2025-05-22

## 2025-05-22 RX ORDER — HYDROCHLOROTHIAZIDE 12.5 MG/1
CAPSULE ORAL
Qty: 2 EACH | Refills: 2 | Status: SHIPPED | OUTPATIENT
Start: 2025-05-22

## 2025-05-22 RX ORDER — LIRAGLUTIDE 6 MG/ML
INJECTION SUBCUTANEOUS
Qty: 2 ADJUSTABLE DOSE PRE-FILLED PEN SYRINGE | Refills: 3 | Status: SHIPPED | OUTPATIENT
Start: 2025-05-22

## 2025-05-22 NOTE — PROGRESS NOTES
[facility] Endocrinology    Chief Complaint:     Chief Complaint   Patient presents with    Follow-up    Diabetes        Subjective:   Malena Woodson is a pleasant 54 y.o. female who presents for follow-up of Diabetes Mellitus type 2. Patient also has hypertension, TRUE, chronic CHF, and has a BMI of 69.    Diagnosed: around 2018.   Initial medications: metformin (ill feeling), Jardiance, farxiga (concerned about yeast infections), Rybelsus (GI upset), didn't try Ozempic due to concerns of long acting medication.  On insulin since: never   Hx of severe hypoglycemia or DKA:  Hx of MI/stroke/CKD: none, CHF on 3 L.   Hx of pancreatitis: none   Family hx of thyroid cancer: none     Most recent HbA1c:   Hemoglobin A1C   Date Value Ref Range Status   10/03/2024 8.2 % Final      Current regimen:  Glyburide 5 mg daily    Blood sugar ranges:every other day, 200-300 in the morning.   Hypoglycemic episodes: none     Meals: no cooking, prefers fast food.   B-Mac sausage sandwich, biscuits  L-might skip   D-Catfish sandwich with fries   Orders doordash or oatmeal.   Loves popsicles, Dr. Pepper.     Had seen a nutritionist in the past.      Exercise: limited, does chair exercises.   Last eye exam: Due Oct-Nov, early cataracts.   No PCP currently.     Foot care: no cuts or ulcers.   Had yeast No UTI or genital infections.    Hyperlipidemia: On atorvastatin 40 mg daily.  Lipid panel from August 2024 showed an LDL of 87, triglycerides 106.  No smoking, alcohol or illicit drug use.  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    The following portions of the patient's history were reviewed and updated as appropriate:       Current Outpatient Medications:     Liraglutide (VICTOZA) 18 MG/3ML SOPN SC injection, Take 0.6 mg daily for 1 week then up to 1.2 mg daily., Disp: 2 Adjustable Dose Pre-filled Pen Syringe, Rfl: 3    Insulin Pen Needle 32G X 4 MM MISC, 1 each by Does not apply route

## 2025-05-23 RX ORDER — ATORVASTATIN CALCIUM 40 MG/1
40 TABLET, FILM COATED ORAL DAILY
Qty: 90 TABLET | Refills: 3 | Status: SHIPPED | OUTPATIENT
Start: 2025-05-23

## 2025-05-23 NOTE — TELEPHONE ENCOUNTER
Prescription refill:  Requested Prescriptions     Pending Prescriptions Disp Refills    atorvastatin (LIPITOR) 40 MG tablet 90 tablet 3     Sig: Take 1 tablet by mouth daily       Refill parameters per protocol were met    Last OV: 12/18/2024     Future Appt: 6/20/2025     LABS: 12/18/24

## 2025-06-18 NOTE — PROGRESS NOTES
[facility]  Advanced CHF/Pulmonary Hypertension   Cardiac Evaluation      Malena Woodson  YOB: 1970    Date of Visit:  6/20/25    Chief Complaint   Patient presents with    6 Month Follow-Up    Congestive Heart Failure      History of Present Illness:  Malena Woodson is a 54 y.o. female with a past medical history of HTN, DM, TRUE on Bipap, asthma, and morbid obesity.      On 5/4/2022, she presented to the hospital with shortness of breath, cough, and sore throat. In the ED she was found to be hypoxic with sats in the 80s. CTPA was negative for PE. She was admitted with asthma exacerbation, started on solu medrol and nebs. She was transitioned to prednisone but was still requiring O2 at time of discharge on 5/17/22.     She was admitted Dec 2022 with worsening SOB, diagnosed with Covid. In the ED chest x-ray showed cardiomegaly and pulmonary vascular congestion with bilateral lung infiltrates concerning for edema/viral or atypical pneumonia. IV steroids. Discharged in 3 days.     She is NOT using her Bipap every night and sees Dr. Yang. But she is wearing oxygen  Endocrinology: Dr. Joselyn Delgado MD    Pt was last seen in office 6/12/24 and presents today for a follow up of chronic diastolic heart failure. Last EF 50-55% on 12/5/22. Pt had intolerance to Rybelsus.  She was started on Farxiga 10mg daily per Dr. Delgado on 2/20/25 and her glyburide was increased to 5 mg twice daily due to A1c of 8.2 which is improvement from 8.8. She states she moved to [place].  She states she has been having falls.  She has not checked her O2 after these episodes.  Today she was weak with getting her weight on the scale and stated she felt like she was going to fall.  She was 84% on 1 liter in the office but she is supposed to be wearing 3 Liters.  Her syncopal falls at home include dizziness and a pulsation feeling in her head.  She \"knows if she takes one more step, she will fall.\"  She found a PCP in

## 2025-06-20 ENCOUNTER — HOSPITAL ENCOUNTER (OUTPATIENT)
Age: 55
Discharge: HOME | End: 2025-06-20
Payer: COMMERCIAL

## 2025-06-20 ENCOUNTER — OFFICE VISIT (OUTPATIENT)
Dept: CARDIOLOGY CLINIC | Age: 55
End: 2025-06-20
Payer: COMMERCIAL

## 2025-06-20 VITALS
WEIGHT: 293 LBS | DIASTOLIC BLOOD PRESSURE: 80 MMHG | SYSTOLIC BLOOD PRESSURE: 138 MMHG | HEIGHT: 64 IN | OXYGEN SATURATION: 94 % | BODY MASS INDEX: 50.02 KG/M2 | HEART RATE: 86 BPM

## 2025-06-20 DIAGNOSIS — R42 DIZZINESS: ICD-10-CM

## 2025-06-20 DIAGNOSIS — E11.69 TYPE 2 DIABETES MELLITUS WITH OBESITY (HCC): ICD-10-CM

## 2025-06-20 DIAGNOSIS — G47.33 OSA (OBSTRUCTIVE SLEEP APNEA): ICD-10-CM

## 2025-06-20 DIAGNOSIS — D50.9 IRON DEFICIENCY ANEMIA, UNSPECIFIED IRON DEFICIENCY ANEMIA TYPE: ICD-10-CM

## 2025-06-20 DIAGNOSIS — R25.1 EPISODE OF SHAKING: ICD-10-CM

## 2025-06-20 DIAGNOSIS — R06.02 SOB (SHORTNESS OF BREATH): ICD-10-CM

## 2025-06-20 DIAGNOSIS — E66.9 TYPE 2 DIABETES MELLITUS WITH OBESITY (HCC): ICD-10-CM

## 2025-06-20 DIAGNOSIS — E78.2 MIXED HYPERLIPIDEMIA: ICD-10-CM

## 2025-06-20 DIAGNOSIS — I50.42 CHRONIC COMBINED SYSTOLIC AND DIASTOLIC CHF (CONGESTIVE HEART FAILURE) (HCC): ICD-10-CM

## 2025-06-20 DIAGNOSIS — E55.9 VITAMIN D DEFICIENCY: ICD-10-CM

## 2025-06-20 DIAGNOSIS — I10 ESSENTIAL HYPERTENSION: ICD-10-CM

## 2025-06-20 DIAGNOSIS — I50.42 CHRONIC COMBINED SYSTOLIC AND DIASTOLIC CHF (CONGESTIVE HEART FAILURE) (HCC): Primary | ICD-10-CM

## 2025-06-20 DIAGNOSIS — R55 SYNCOPE, UNSPECIFIED SYNCOPE TYPE: ICD-10-CM

## 2025-06-20 LAB
25(OH)D3 SERPL-MCNC: 24.6 NG/ML
ALBUMIN SERPL-MCNC: 4.2 G/DL (ref 3.4–5)
ALBUMIN/GLOB SERPL: 1.4 {RATIO} (ref 1.1–2.2)
ALP SERPL-CCNC: 141 U/L (ref 40–129)
ALT SERPL-CCNC: 25 U/L (ref 10–40)
ANION GAP SERPL CALCULATED.3IONS-SCNC: 8 MMOL/L (ref 3–16)
AST SERPL-CCNC: 28 U/L (ref 15–37)
BILIRUB SERPL-MCNC: 0.4 MG/DL (ref 0–1)
BUN SERPL-MCNC: 16 MG/DL (ref 7–20)
CALCIUM SERPL-MCNC: 10.1 MG/DL (ref 8.3–10.6)
CHLORIDE SERPL-SCNC: 92 MMOL/L (ref 99–110)
CO2 SERPL-SCNC: 38 MMOL/L (ref 21–32)
CREAT SERPL-MCNC: 0.9 MG/DL (ref 0.6–1.1)
DEPRECATED RDW RBC AUTO: 18.3 % (ref 12.4–15.4)
FERRITIN SERPL IA-MCNC: 60.8 NG/ML (ref 15–150)
GFR SERPLBLD CREATININE-BSD FMLA CKD-EPI: 75 ML/MIN/{1.73_M2}
GLUCOSE SERPL-MCNC: 277 MG/DL (ref 70–99)
HCT VFR BLD AUTO: 38.9 % (ref 36–48)
HGB BLD-MCNC: 11.8 G/DL (ref 12–16)
IRON SATN MFR SERPL: 17 % (ref 15–50)
IRON SERPL-MCNC: 60 UG/DL (ref 37–145)
MCH RBC QN AUTO: 25.4 PG (ref 26–34)
MCHC RBC AUTO-ENTMCNC: 30.4 G/DL (ref 31–36)
MCV RBC AUTO: 83.5 FL (ref 80–100)
NT-PROBNP SERPL-MCNC: 100 PG/ML (ref 0–124)
PLATELET # BLD AUTO: 214 K/UL (ref 135–450)
PMV BLD AUTO: 9.6 FL (ref 5–10.5)
POTASSIUM SERPL-SCNC: 4.3 MMOL/L (ref 3.5–5.1)
PROT SERPL-MCNC: 7.2 G/DL (ref 6.4–8.2)
RBC # BLD AUTO: 4.65 M/UL (ref 4–5.2)
SODIUM SERPL-SCNC: 138 MMOL/L (ref 136–145)
TIBC SERPL-MCNC: 358 UG/DL (ref 260–445)
WBC # BLD AUTO: 7.1 K/UL (ref 4–11)

## 2025-06-20 PROCEDURE — 80053 COMPREHEN METABOLIC PANEL: CPT

## 2025-06-20 PROCEDURE — 36415 COLL VENOUS BLD VENIPUNCTURE: CPT

## 2025-06-20 PROCEDURE — 82306 VITAMIN D 25 HYDROXY: CPT

## 2025-06-20 PROCEDURE — 83540 ASSAY OF IRON: CPT

## 2025-06-20 PROCEDURE — 83550 IRON BINDING TEST: CPT

## 2025-06-20 PROCEDURE — 99215 OFFICE O/P EST HI 40 MIN: CPT | Performed by: INTERNAL MEDICINE

## 2025-06-20 PROCEDURE — 3079F DIAST BP 80-89 MM HG: CPT | Performed by: INTERNAL MEDICINE

## 2025-06-20 PROCEDURE — G2211 COMPLEX E/M VISIT ADD ON: HCPCS | Performed by: INTERNAL MEDICINE

## 2025-06-20 PROCEDURE — 82728 ASSAY OF FERRITIN: CPT

## 2025-06-20 PROCEDURE — 3075F SYST BP GE 130 - 139MM HG: CPT | Performed by: INTERNAL MEDICINE

## 2025-06-20 PROCEDURE — 83880 ASSAY OF NATRIURETIC PEPTIDE: CPT

## 2025-06-20 PROCEDURE — 85027 COMPLETE CBC AUTOMATED: CPT

## 2025-06-20 PROCEDURE — 83036 HEMOGLOBIN GLYCOSYLATED A1C: CPT

## 2025-06-20 NOTE — PATIENT INSTRUCTIONS
PLAN:   Labs today.  CBC, CMP, BNP, Iron, TIBC, Ferritin, Vit D.   2.    Get scheduled for Carotid dopplers. To assess for reason for falls and dizziness.   3.    We will call with results.   4.    See Dr. Vilchis in 6 months

## 2025-06-21 LAB
EST. AVERAGE GLUCOSE BLD GHB EST-MCNC: 263.3 MG/DL
HBA1C MFR BLD: 10.8 %

## 2025-06-23 ENCOUNTER — RESULTS FOLLOW-UP (OUTPATIENT)
Dept: CARDIOLOGY | Age: 55
End: 2025-06-23

## 2025-06-24 ENCOUNTER — RESULTS FOLLOW-UP (OUTPATIENT)
Dept: ENDOCRINOLOGY | Age: 55
End: 2025-06-24

## 2025-06-24 ENCOUNTER — TELEPHONE (OUTPATIENT)
Dept: CARDIOLOGY CLINIC | Age: 55
End: 2025-06-24

## 2025-06-24 RX ORDER — ERGOCALCIFEROL 1.25 MG/1
50000 CAPSULE ORAL WEEKLY
Qty: 12 CAPSULE | Refills: 1 | Status: SHIPPED | OUTPATIENT
Start: 2025-06-24

## 2025-06-24 NOTE — TELEPHONE ENCOUNTER
Called pt and relayed message below. Pt states verbal understanding.    Nargis Vilchis MD  [facility] Cardio Heart Failure  See below.  Please call the patient and make sure she gets this message.  Call in ergocalciferol 50,000 units once a week #12 with 1 refill.  Malena, your labs show that:  1.  Your vitamin D level is again low.  I will have the office call you in a vitamin D pill to take once a week for 6 months.  2.  Your glucose was quite elevated at 277.  You need to establish with a primary care physician in [place] when you can  3.  You still have mild anemia but your iron levels are normal.   4.  Fluid level (BNP) is in the normal range, good news.    I will have the office call you about the vitamin D.  Nargis Vilchis

## 2025-08-26 DIAGNOSIS — E66.9 TYPE 2 DIABETES MELLITUS WITH OBESITY (HCC): ICD-10-CM

## 2025-08-26 DIAGNOSIS — E78.2 MIXED HYPERLIPIDEMIA: ICD-10-CM

## 2025-08-26 DIAGNOSIS — E11.69 TYPE 2 DIABETES MELLITUS WITH OBESITY (HCC): ICD-10-CM

## 2025-08-26 DIAGNOSIS — R06.02 SOB (SHORTNESS OF BREATH): ICD-10-CM

## 2025-08-26 DIAGNOSIS — I50.42 CHRONIC COMBINED SYSTOLIC AND DIASTOLIC CHF (CONGESTIVE HEART FAILURE) (HCC): ICD-10-CM

## 2025-08-26 RX ORDER — ASPIRIN 81 MG/1
TABLET, CHEWABLE ORAL
Qty: 90 TABLET | Refills: 3 | Status: SHIPPED | OUTPATIENT
Start: 2025-08-26

## 2025-08-26 RX ORDER — ATORVASTATIN CALCIUM 40 MG/1
40 TABLET, FILM COATED ORAL DAILY
Qty: 90 TABLET | Refills: 3 | Status: SHIPPED | OUTPATIENT
Start: 2025-08-26

## 2025-08-26 RX ORDER — HYDROCHLOROTHIAZIDE 12.5 MG/1
CAPSULE ORAL
Qty: 2 EACH | Refills: 2 | Status: SHIPPED | OUTPATIENT
Start: 2025-08-26

## 2025-08-26 RX ORDER — SPIRONOLACTONE 25 MG/1
25 TABLET ORAL DAILY
Qty: 90 TABLET | Refills: 3 | Status: SHIPPED | OUTPATIENT
Start: 2025-08-26